# Patient Record
Sex: FEMALE | Race: BLACK OR AFRICAN AMERICAN | Employment: UNEMPLOYED | URBAN - METROPOLITAN AREA
[De-identification: names, ages, dates, MRNs, and addresses within clinical notes are randomized per-mention and may not be internally consistent; named-entity substitution may affect disease eponyms.]

---

## 2017-01-13 DIAGNOSIS — Z76.0 MEDICATION REFILL: ICD-10-CM

## 2017-01-13 DIAGNOSIS — M62.838 MUSCLE SPASM: ICD-10-CM

## 2017-01-13 RX ORDER — GUAIFENESIN 100 MG/5ML
81 LIQUID (ML) ORAL DAILY
Qty: 90 TAB | Refills: 3 | Status: SHIPPED | OUTPATIENT
Start: 2017-01-13 | End: 2017-05-11 | Stop reason: SDUPTHER

## 2017-01-13 RX ORDER — BACLOFEN 10 MG/1
TABLET ORAL
Qty: 90 TAB | Refills: 3 | Status: SHIPPED | OUTPATIENT
Start: 2017-01-13 | End: 2017-02-17 | Stop reason: SDUPTHER

## 2017-04-15 DIAGNOSIS — R10.10 UPPER ABDOMINAL PAIN: ICD-10-CM

## 2017-04-17 RX ORDER — OMEPRAZOLE 20 MG/1
20 CAPSULE, DELAYED RELEASE ORAL DAILY
Qty: 90 CAP | Refills: 3 | Status: SHIPPED | OUTPATIENT
Start: 2017-04-17 | End: 2017-05-11

## 2017-04-24 DIAGNOSIS — Z76.0 MEDICATION REFILL: ICD-10-CM

## 2017-04-24 DIAGNOSIS — M25.552 BILATERAL HIP PAIN: ICD-10-CM

## 2017-04-24 DIAGNOSIS — M62.838 MUSCLE SPASM: ICD-10-CM

## 2017-04-24 DIAGNOSIS — M25.551 BILATERAL HIP PAIN: ICD-10-CM

## 2017-04-24 RX ORDER — TRAMADOL HYDROCHLORIDE 50 MG/1
50 TABLET ORAL
Qty: 120 TAB | Refills: 0 | OUTPATIENT
Start: 2017-04-24

## 2017-04-24 RX ORDER — BACLOFEN 10 MG/1
TABLET ORAL
Qty: 90 TAB | Refills: 6 | OUTPATIENT
Start: 2017-04-24

## 2017-04-24 NOTE — TELEPHONE ENCOUNTER
Denied:    Requested Prescriptions     Refused Prescriptions Disp Refills    baclofen (LIORESAL) 10 mg tablet 90 Tab 6     Sig: take 1 tablet by mouth three times a day if needed for muscle spasm     Refused By: Musa Mckinney     Reason for Refusal: Appt required, please call patient    traMADol (ULTRAM) 50 mg tablet 120 Tab 0     Sig: Take 1 Tab by mouth every six (6) hours as needed for Pain. Refused By: Musa Mckinney     Reason for Refusal: Appt required, please call patient     Pt needs to make an OV with me. Last OV was 11/2016.

## 2017-04-25 NOTE — TELEPHONE ENCOUNTER
Spoke with patient, confirmed name and . Advised patient of need for and appointment, per Dr. Viviane Tony. Patient verbalized understanding, scheduled for [id number] @ 1130.      Be advised

## 2017-05-11 ENCOUNTER — HOSPITAL ENCOUNTER (OUTPATIENT)
Dept: LAB | Age: 62
Discharge: HOME | End: 2017-05-11

## 2017-05-11 ENCOUNTER — OFFICE VISIT (OUTPATIENT)
Dept: INTERNAL MEDICINE CLINIC | Age: 62
End: 2017-05-11

## 2017-05-11 VITALS
SYSTOLIC BLOOD PRESSURE: 119 MMHG | TEMPERATURE: 96.5 F | BODY MASS INDEX: 27.39 KG/M2 | RESPIRATION RATE: 18 BRPM | OXYGEN SATURATION: 96 % | HEIGHT: 63 IN | DIASTOLIC BLOOD PRESSURE: 82 MMHG | HEART RATE: 85 BPM | WEIGHT: 154.6 LBS

## 2017-05-11 DIAGNOSIS — J45.909 UNCOMPLICATED ASTHMA, UNSPECIFIED ASTHMA SEVERITY: ICD-10-CM

## 2017-05-11 DIAGNOSIS — Z76.0 MEDICATION REFILL: ICD-10-CM

## 2017-05-11 DIAGNOSIS — J40 BRONCHITIS: ICD-10-CM

## 2017-05-11 DIAGNOSIS — K59.09 CHRONIC CONSTIPATION: ICD-10-CM

## 2017-05-11 DIAGNOSIS — Z12.11 COLON CANCER SCREENING: ICD-10-CM

## 2017-05-11 DIAGNOSIS — M62.838 MUSCLE SPASM: ICD-10-CM

## 2017-05-11 DIAGNOSIS — J44.9 CHRONIC OBSTRUCTIVE PULMONARY DISEASE, UNSPECIFIED COPD TYPE (HCC): ICD-10-CM

## 2017-05-11 DIAGNOSIS — R05.9 COUGH: ICD-10-CM

## 2017-05-11 DIAGNOSIS — M54.9 UPPER BACK PAIN: ICD-10-CM

## 2017-05-11 DIAGNOSIS — E78.5 DYSLIPIDEMIA: ICD-10-CM

## 2017-05-11 DIAGNOSIS — G89.29 ENCOUNTER FOR CHRONIC PAIN MANAGEMENT: ICD-10-CM

## 2017-05-11 DIAGNOSIS — M25.551 BILATERAL HIP PAIN: ICD-10-CM

## 2017-05-11 DIAGNOSIS — M25.552 BILATERAL HIP PAIN: ICD-10-CM

## 2017-05-11 DIAGNOSIS — E11.9 DIABETES MELLITUS, STABLE (HCC): ICD-10-CM

## 2017-05-11 DIAGNOSIS — Z00.00 ENCOUNTER FOR MEDICARE ANNUAL WELLNESS EXAM: Primary | ICD-10-CM

## 2017-05-11 PROBLEM — Z71.89 ADVANCE DIRECTIVE DISCUSSED WITH PATIENT: Status: ACTIVE | Noted: 2017-05-11

## 2017-05-11 PROCEDURE — 99001 SPECIMEN HANDLING PT-LAB: CPT | Performed by: INTERNAL MEDICINE

## 2017-05-11 RX ORDER — BUDESONIDE AND FORMOTEROL FUMARATE DIHYDRATE 160; 4.5 UG/1; UG/1
2 AEROSOL RESPIRATORY (INHALATION) 2 TIMES DAILY
COMMUNITY
Start: 2017-03-24 | End: 2017-07-07 | Stop reason: SDUPTHER

## 2017-05-11 RX ORDER — AZITHROMYCIN 250 MG/1
TABLET, FILM COATED ORAL
Qty: 6 TAB | Refills: 0 | Status: SHIPPED | OUTPATIENT
Start: 2017-05-11 | End: 2017-05-16

## 2017-05-11 RX ORDER — TRAZODONE HYDROCHLORIDE 50 MG/1
100 TABLET ORAL
Qty: 60 TAB | Refills: 3 | Status: SHIPPED | OUTPATIENT
Start: 2017-05-11 | End: 2018-03-09 | Stop reason: SDUPTHER

## 2017-05-11 RX ORDER — SIMVASTATIN 10 MG/1
10 TABLET, FILM COATED ORAL
Qty: 90 TAB | Refills: 3 | Status: SHIPPED | OUTPATIENT
Start: 2017-05-11 | End: 2018-04-04 | Stop reason: SDUPTHER

## 2017-05-11 RX ORDER — BACLOFEN 10 MG/1
TABLET ORAL
Qty: 90 TAB | Refills: 6 | Status: SHIPPED | OUTPATIENT
Start: 2017-05-11 | End: 2017-06-20 | Stop reason: SDUPTHER

## 2017-05-11 RX ORDER — ALBUTEROL SULFATE 90 UG/1
AEROSOL, METERED RESPIRATORY (INHALATION)
Qty: 1 INHALER | Refills: 3 | Status: SHIPPED | OUTPATIENT
Start: 2017-05-11 | End: 2017-09-05 | Stop reason: SDUPTHER

## 2017-05-11 RX ORDER — BENZONATATE 100 MG/1
100 CAPSULE ORAL
Qty: 15 CAP | Refills: 0 | Status: SHIPPED | OUTPATIENT
Start: 2017-05-11 | End: 2017-08-19 | Stop reason: SDUPTHER

## 2017-05-11 RX ORDER — POLYETHYLENE GLYCOL 3350 17 G/17G
17 POWDER, FOR SOLUTION ORAL
Qty: 90 PACKET | Refills: 3 | Status: SHIPPED | OUTPATIENT
Start: 2017-05-11 | End: 2018-07-24

## 2017-05-11 RX ORDER — MONTELUKAST SODIUM 10 MG/1
10 TABLET ORAL EVERY EVENING
Qty: 90 TAB | Refills: 3 | Status: SHIPPED | OUTPATIENT
Start: 2017-05-11 | End: 2017-10-03 | Stop reason: SDUPTHER

## 2017-05-11 RX ORDER — OMEPRAZOLE 20 MG/1
20 CAPSULE, DELAYED RELEASE ORAL DAILY
Qty: 90 CAP | Refills: 3 | Status: SHIPPED | OUTPATIENT
Start: 2017-05-11 | End: 2017-09-05

## 2017-05-11 RX ORDER — CHOLECALCIFEROL (VITAMIN D3) 125 MCG
2000 CAPSULE ORAL DAILY
Qty: 90 TAB | Refills: 3 | Status: SHIPPED | OUTPATIENT
Start: 2017-05-11

## 2017-05-11 RX ORDER — ALBUTEROL SULFATE 0.83 MG/ML
SOLUTION RESPIRATORY (INHALATION)
Qty: 3 PACKAGE | Refills: 3 | Status: SHIPPED | OUTPATIENT
Start: 2017-05-11 | End: 2018-01-30 | Stop reason: SDUPTHER

## 2017-05-11 RX ORDER — TRAMADOL HYDROCHLORIDE 50 MG/1
50 TABLET ORAL
Qty: 120 TAB | Refills: 0 | Status: SHIPPED | OUTPATIENT
Start: 2017-05-11 | End: 2017-06-20 | Stop reason: SDUPTHER

## 2017-05-11 RX ORDER — GUAIFENESIN 100 MG/5ML
81 LIQUID (ML) ORAL DAILY
Qty: 90 TAB | Refills: 3 | Status: SHIPPED | OUTPATIENT
Start: 2017-05-11 | End: 2018-07-05 | Stop reason: SDUPTHER

## 2017-05-11 NOTE — MR AVS SNAPSHOT
Visit Information Date & Time Provider Department Dept. Phone Encounter #  
 5/11/2017 11:30 AM Yolette Hubbard MD [facility] [phone] [id number] Upcoming Health Maintenance Date Due  
 FOOT EXAM Q1 8/19/1967 MICROALBUMIN Q1 8/19/1967 EYE EXAM RETINAL OR DILATED Q1 8/19/1967 DTaP/Tdap/Td series (1 - Tdap) 8/19/1978 FOBT Q 1 YEAR AGE 50-75 9/23/2015 PAP AKA CERVICAL CYTOLOGY 6/21/2016 HEMOGLOBIN A1C Q6M 5/8/2017 INFLUENZA AGE 9 TO ADULT 8/1/2017 LIPID PANEL Q1 11/8/2017 BREAST CANCER SCRN MAMMOGRAM 7/15/2018 Allergies as of 5/11/2017  Review Complete On: 5/11/2017 By: Yolette Hubbard MD  
  
 Severity Noted Reaction Type Reactions Ibuprofen Medium 05/01/2013   Side Effect Itching, Nausea Only, Swelling  
 swelling Neurontin [Gabapentin] Medium 08/18/2014   Side Effect Anxiety Current Immunizations  Reviewed on 6/29/2016 Name Date Influenza Vaccine 11/6/2014  1:50 PM, 10/17/2013, 10/13/2011, 1/20/2011 Influenza Vaccine (Quad) PF 11/4/2015 Pneumococcal Polysaccharide (PPSV-23) 10/17/2013, 10/13/2011 Not reviewed this visit You Were Diagnosed With   
  
 Codes Comments Encounter for Medicare annual wellness exam    -  Primary ICD-10-CM: Z00.00 ICD-9-CM: V70.0 Diabetes mellitus, stable ([facility])     ICD-10-CM: E11.9 ICD-9-CM: 250.00 Uncomplicated asthma, unspecified asthma severity     ICD-10-CM: J45.909 ICD-9-CM: 493.90 Chronic obstructive pulmonary disease, unspecified COPD type ([facility])     ICD-10-CM: J44.9 ICD-9-CM: 798 Bilateral hip pain     ICD-10-CM: M25.551, M25.552 ICD-9-CM: 719.45 Upper abdominal pain     ICD-10-CM: R10.10 ICD-9-CM: 789.09 Medication refill     ICD-10-CM: Z76.0 ICD-9-CM: V68.1 Muscle spasm     ICD-10-CM: O08.276 ICD-9-CM: 728.85 Chronic constipation     ICD-10-CM: K59.09 
ICD-9-CM: 564.00 Dyslipidemia     ICD-10-CM: E78.5 ICD-9-CM: 272.4 Colon cancer screening     ICD-10-CM: Z12.11 ICD-9-CM: V76.51 Cough     ICD-10-CM: R05 ICD-9-CM: 786.2 Encounter for chronic pain management     ICD-10-CM: G89.29 ICD-9-CM: V65.49 Bronchitis     ICD-10-CM: J40 ICD-9-CM: 360 Vitals BP Pulse Temp Resp Height(growth percentile) Weight(growth percentile) 119/82 (BP 1 Location: Right arm, BP Patient Position: Sitting) 85 96.5 °F (35.8 °C) (Oral) 18 5' 3\" (1.6 m) 154 lb 9.6 oz (70.1 kg) SpO2 BMI OB Status Smoking Status 96% 27.39 kg/m2 Postmenopausal Current Every Day Smoker Vitals History BMI and BSA Data Body Mass Index Body Surface Area  
 27.39 kg/m 2 1.77 m 2 Preferred Pharmacy [address] [place] [address] [id number] [phone] Your Updated Medication List  
  
   
This list is accurate as of: 5/11/17 11:37 AM.  Always use your most recent med list.  
  
  
  
  
 * albuterol 2.5 mg /3 mL (0.083 %) nebulizer solution Commonly known as:  PROVENTIL VENTOLIN  
USe 1 vial every 4-6 hours prn shortness of breath * albuterol 90 mcg/actuation inhaler Commonly known as:  PROVENTIL HFA, VENTOLIN HFA, PROAIR HFA Take 1-2 puffs every 4-6 hrs prn shortness of breath  
  
 aspirin 81 mg chewable tablet Take 1 Tab by mouth daily. azithromycin 250 mg tablet Commonly known as:  Orelia Carolyn Take 2 tablets today, then take 1 tablet daily  
  
 baclofen 10 mg tablet Commonly known as:  LIORESAL  
take 1 tablet by mouth three times a day if needed for muscle spasm  
  
 benzonatate 100 mg capsule Commonly known as:  TESSALON Take 1 Cap by mouth three (3) times daily as needed for Cough. Blood-Glucose Meter monitoring kit Commonly known as:  FREESTYLE LITE METER Check fasting sugars before breakfast. Dx Code 790.21  
  
 cholecalciferol (vitamin D3) 2,000 unit Tab Commonly known as:  VITAMIN D3  
 Take 1 Tab by mouth daily. docusate sodium 100 mg capsule Commonly known as:  Monica Gaw Take 2 po qpm prn constipation  
  
 fluticasone-salmeterol 500-50 mcg/dose diskus inhaler Commonly known as:  ADVAIR Take 1 Puff by inhalation two (2) times a day. glucose blood VI test strips strip Commonly known as:  FREESTYLE LITE STRIPS Check fasting sugars before breakfast. Dx Code 790.21 Lancets Misc Check fasting sugars before breakfast. Dx Code 790.21. For freestyle lite meter. montelukast 10 mg tablet Commonly known as:  SINGULAIR Take 1 Tab by mouth every evening. Nebulizer Accessories Kit Use as directed. Pt needs refill of supplies for nebulizer machine. omeprazole 20 mg capsule Commonly known as:  PRILOSEC Take 1 Cap by mouth daily. polyethylene glycol 17 gram packet Commonly known as:  Treva Moynahan Take 1 Packet by mouth daily as needed (constipation). simvastatin 10 mg tablet Commonly known as:  ZOCOR Take 1 Tab by mouth nightly. SPIRIVA WITH HANDIHALER 18 mcg inhalation capsule Generic drug:  tiotropium  
inhale the contents of one capsule in the handihaler once daily SYMBICORT 160-4.5 mcg/actuation HFA inhaler Generic drug:  budesonide-formoterol Take 2 Puffs by inhalation two (2) times a day. traMADol 50 mg tablet Commonly known as:  ULTRAM  
Take 1 Tab by mouth every six (6) hours as needed for Pain. traZODone 50 mg tablet Commonly known as:  Shelva Austin Take 2 Tabs by mouth nightly. * Notice: This list has 2 medication(s) that are the same as other medications prescribed for you. Read the directions carefully, and ask your doctor or other care provider to review them with you. Prescriptions Printed Refills  
 traMADol (ULTRAM) 50 mg tablet 0 Sig: Take 1 Tab by mouth every six (6) hours as needed for Pain. Class: Print Route: Oral  
  
Prescriptions Sent to Pharmacy Refills  
 simvastatin (ZOCOR) 10 mg tablet 3 Sig: Take 1 Tab by mouth nightly. Class: Normal  
 Pharmacy: [place] [address] Ph #: [phone] Route: Oral  
 traZODone (DESYREL) 50 mg tablet 3 Sig: Take 2 Tabs by mouth nightly. Class: Normal  
 Pharmacy: [place] [address] Ph #: [phone] Route: Oral  
 baclofen (LIORESAL) 10 mg tablet 6 Sig: take 1 tablet by mouth three times a day if needed for muscle spasm Class: Normal  
 Pharmacy: [place] [address] Ph #: [phone]  
 omeprazole (PRILOSEC) 20 mg capsule 3 Sig: Take 1 Cap by mouth daily. Class: Normal  
 Pharmacy: [place] [address] Ph #: [phone] Route: Oral  
 aspirin 81 mg chewable tablet 3 Sig: Take 1 Tab by mouth daily. Class: Normal  
 Pharmacy: [place] [address] Ph #: [phone] Route: Oral  
 cholecalciferol, vitamin D3, (VITAMIN D3) 2,000 unit tab 3 Sig: Take 1 Tab by mouth daily. Class: Normal  
 Pharmacy: [place] [address] Ph #: [phone] Route: Oral  
 montelukast (SINGULAIR) 10 mg tablet 3 Sig: Take 1 Tab by mouth every evening. Class: Normal  
 Pharmacy: [place] [address] Ph #: [phone] Route: Oral  
 polyethylene glycol (MIRALAX) 17 gram packet 3 Sig: Take 1 Packet by mouth daily as needed (constipation). Class: Normal  
 Pharmacy: [place] [address] Ph #: [phone] Route: Oral  
 albuterol (PROVENTIL VENTOLIN) 2.5 mg /3 mL (0.083 %) nebulizer solution 3 Sig: USe 1 vial every 4-6 hours prn shortness of breath  Class: Normal  
 Pharmacy: [id number] [place] [address] Ph #: [phone]  
 albuterol (PROVENTIL HFA, VENTOLIN HFA, PROAIR HFA) 90 mcg/actuation inhaler 3 Sig: Take 1-2 puffs every 4-6 hrs prn shortness of breath Class: Normal  
 Pharmacy: [place] [address] Ph #: [phone]  
 azithromycin (ZITHROMAX) 250 mg tablet 0 Sig: Take 2 tablets today, then take 1 tablet daily Class: Normal  
 Pharmacy: [place] [address] Ph #: [phone]  
 benzonatate (TESSALON) 100 mg capsule 0 Sig: Take 1 Cap by mouth three (3) times daily as needed for Cough. Class: Normal  
 Pharmacy: [place] [address] Ph #: [phone] Route: Oral  
  
We Performed the Following  DIABETES FOOT EXAM [[facility]] To-Do List   
 05/11/2017 Lab:  13-DRUG SCREEN W/CONFIRM, WHOLE BLOOD   
  
 05/11/2017 Lab:  HEMOGLOBIN A1C W/O EAG   
  
 05/11/2017 Lab:  LIPID PANEL   
  
 05/11/2017 Lab:  METABOLIC PANEL, COMPREHENSIVE   
  
 05/11/2017 Lab:  MICROALBUMIN, UR, RAND W/ MICROALBUMIN/CREA RATIO   
  
 05/11/2017 Lab:  OCCULT BLOOD, IMMUNOASSAY (FIT)   
  
 05/11/2017 Imaging:  XR CHEST PA LAT   
  
 05/11/2017 Imaging:  XR SPINE THORAC 2 V   
  
 05/12/2017 Imaging:  MRI HIP BI WO CONT Patient Instructions Advance Care Planning: Care Instructions Your Care Instructions It can be hard to live with an illness that cannot be cured. But if your health is getting worse, you may want to make decisions about end-of-life care. Planning for the end of your life does not mean that you are giving up. It is a way to make sure that your wishes are met. Clearly stating your wishes can make it easier for your loved ones. Making plans while you are still able may also ease your mind and make your final days less stressful and more meaningful. Follow-up care is a key part of your treatment and safety. Be sure to make and go to all appointments, and call your doctor if you are having problems. It's also a good idea to know your test results and keep a list of the medicines you take. What can you do to plan for the end of life? · You can bring these issues up with your doctor. You do not need to wait until your doctor starts the conversation. You might start with \"I would not be willing to live with . Andreina Cashing Andreina Cashing Andreina Cashing \" When you complete this sentence it helps your doctor understand your wishes. · Talk openly and honestly with your doctor. This is the best way to understand the decisions you will need to make as your health changes. Know that you can always change your mind. · Ask your doctor about commonly used life-support measures. These include tube feedings, breathing machines, and fluids given through a vein (IV). Understanding these treatments will help you decide whether you want them. · You may choose to have these life-supporting treatments for a limited time. This allows a trial period to see whether they will help you. You may also decide that you want your doctor to take only certain measures to keep you alive. It is important to spell out these conditions so that your doctor and family understand them. · Talk to your doctor about how long you are likely to live. He or she may be able to give you an idea of what usually happens with your specific illness. · Think about preparing papers that state your wishes. This way there will not be any confusion about what you want. You can change your instructions at any time. Which papers should you prepare? Advance directives are legal papers that tell doctors how you want to be cared for at the end of your life. You do not need a  to write these papers. Ask your doctor or your state health department for information on how to write your advance directives.  They may have the forms for each of these types of papers. Make sure your doctor has a copy of these on file, and give a copy to a family member or close friend. · Consider a do-not-resuscitate order (DNR). This order asks that no extra treatments be done if your heart stops or you stop breathing. Extra treatments may include cardiopulmonary resuscitation (CPR), electrical shock to restart your heart, or a machine to breathe for you. If you decide to have a DNR order, ask your doctor to explain and write it. Place the order in your home where everyone can easily see it. · Consider a living will. A living will explains your wishes about life support and other treatments at the end of your life if you become unable to speak for yourself. Living toussaint tell doctors to use or not use treatments that would keep you alive. You must have one or two witnesses or a notary present when you sign this form. · Consider a durable power of  for health care. This allows you to name a person to make decisions about your care if you are not able to. Most people ask a close friend or family member. Talk to this person about the kinds of treatments you want and those that you do not want. Make sure this person understands your wishes. These legal papers are simple to change. Tell your doctor what you want to change, and ask him or her to make a note in your medical file. Give your family updated copies of the papers. Where can you learn more? Go to http://stevo-tram.info/. Enter P184 in the search box to learn more about \"Advance Care Planning: Care Instructions. \" Current as of: November 17, 2016 Content Version: 11.2 © 1005-1354 [employer]. Care instructions adapted under license by [employer] (which disclaims liability or warranty for this information).  If you have questions about a medical condition or this instruction, always ask your healthcare professional. [employer] disclaims any warranty or liability for your use of this information. Learning About Durable Power of  for Health Care What is a durable power of  for health care? A durable power of  for health care is one type of the legal forms called advance directives. It lets you decide who you want to make treatment decisions for you if you cannot speak or decide for yourself. The person you choose is called your health care agent. Another type of advance directive is a living will. It lets you write down what kinds of treatment or life support you want or do not want. What should you think about when choosing a health care agent? Choose your health care agent carefully. This person may or may not be a family member. Talk to the person before you make your final decision. Make sure he or she is comfortable with this responsibility. It's a good idea to choose someone who: · Is at least 25years old. · Knows you well and understands what makes life meaningful for you. · Understands your Gnosticist and moral values. · Will do what you want, not what he or she wants. · Will be able to make difficult choices at a stressful time. · Will be able to refuse or stop treatment, if that is what you would want, even if you could die. · Will be firm and confident with health professionals if needed. · Will ask questions to get necessary information. · Lives near you or agrees to travel to you if needed. Your family may help you make medical decisions while you can still be part of that process. But it is important to choose one person to be your health care agent in case you are not able to make decisions for yourself. If you don't fill out the legal form and name a health care agent, the decisions your family can make may be limited. Who will make decisions for you if you do not have a health care agent?  
If you don't have a health care agent or a living will, your family members may disagree about your medical care. And then some medical professionals who may not know you as well might have to make decisions for you. In some cases, a  makes the decisions. When you name a health care agent, it is very clear who has the power to make health decisions for you. How do you name a health care agent? You name your health care agent on a legal form. It is usually called a durable power of  for health care. Ask your hospital, state bar association, or office on aging where to find these forms. You must sign the form to make it legal. Some states require you to get the form notarized. This means that a person called a  watches you sign the form and then he or she signs the form. Some states also require that two or more witnesses sign the form. Be sure to tell your family members and doctors who your health care agent is. Keep your forms in a safe place. But make sure that your loved ones know where the forms are. This could be in your desk where you keep other important papers. Make sure your doctor has a copy of your forms. Where can you learn more? Go to http://stevoExtenda-Denttram.info/. Enter [id number] in the search box to learn more about \"Learning About Durable Power of  for [facility]. \" Current as of: November 17, 2016 Content Version: 11.2 © 8730-4369 [employer]. Care instructions adapted under license by [employer] (which disclaims liability or warranty for this information). If you have questions about a medical condition or this instruction, always ask your healthcare professional. [place] [address] any warranty or liability for your use of this information. Deciding About Life-Prolonging Treatment What is life-prolonging treatment? There are many kinds of treatment that can help you live longer. These may be needed for only a short time until your illness improves.  Or you may use them over the long term to help keep you alive. Some treatments include the use of: · Medicines to slow the progress of certain diseases, such as heart disease, diabetes, cancer, AIDS, or Alzheimer's disease. · Antibiotics to treat serious infections, such as pneumonia. · Dialysis to clean your blood if your kidneys stop working. · A breathing machine to help you breathe if you can't breathe on your own. This machine pumps air into your lungs through a tube put into your throat. · A feeding tube or an intravenous (IV) line to give you food and fluids if you can't eat or drink. · Cardiopulmonary resuscitation (CPR) to try to restart your heart. The decision to receive treatments that may help you live longer is a personal one. You may want your doctor to do everything possible to keep you alive, even when your chance for recovery is small. Or you may choose to only have care to manage your pain and other symptoms. What are key points about this decision? · If there is a good chance that your illness can be cured or managed, your doctor may advise you to first try available treatments. If these don't work, then you might think about stopping treatment. · If you stop treatment, you will still receive care that focuses on pain relief and comfort. · A decision to stop treatment that keeps you alive does not have to be permanent. You can always change your mind if your health starts to improve. · Even though treatment focuses on helping you live longer, it may cause side effects that can greatly affect your quality of life. And it could affect how you spend time with your family and friends. · If you still have personal goals that you want to pursue, you may want treatment that keeps you alive long enough to reach them. Why might you choose life-prolonging treatment? · There is a good chance that your illness can be cured or managed. · You think you can manage the possible side effects of treatment. · You don't think treatment will get in the way of your quality of life. · You have personal goals that you still want to pursue and achieve. Why might you choose to stop life-prolonging treatment? · Your chance of surviving your illness is very low. · You have tried all possible treatments for your illness, but they have not helped. · You can no longer deal with the side effects of treatment. · You have already met the goals you set out to achieve in your life. Your decision Thinking about the facts and your feelings can help you make a decision that is right for you. Be sure you understand the benefits and risks of your options, and think about what else you need to do before you make the decision. Where can you learn more? Go to http://stevo-tram.info/. Enter W048 in the search box to learn more about \"Deciding About Life-Prolonging Treatment. \" Current as of: February 24, 2016 Content Version: 11.2 © 3941-3047 [employer]. Care instructions adapted under license by [employer] (which disclaims liability or warranty for this information). If you have questions about a medical condition or this instruction, always ask your healthcare professional. [address] any warranty or liability for your use of this information. Michelle Noriega [address] What is a living will? A living will is a legal form you use to write down the kind of care you want at the end of your life. It is used by the health professionals who will treat you if you aren't able to decide for yourself. If you put your wishes in writing, your loved ones and others will know what kind of care you want. They won't need to guess. This can ease your mind and be helpful to others. A living will is not the same as an estate or property will. An estate will explains what you want to happen with your money and property after you die. Is a living will a legal document? A living will is a legal document. Each state has its own laws about living toussaint. If you move to another state, make sure that your living will is legal in the state where you now live. Or you might use a universal form that has been approved by many states. This kind of form can sometimes be completed and stored online. Your electronic copy will then be available wherever you have a connection to the Internet. In most cases, doctors will respect your wishes even if you have a form from a different state. · You don't need an  to complete a living will. But legal advice can be helpful if your state's laws are unclear, your health history is complicated, or your family can't agree on what should be in your living will. · You can change your living will at any time. Some people find that their wishes about end-of-life care change as their health changes. · In addition to making a living will, think about completing a medical power of  form. This form lets you name the person you want to make end-of-life treatment decisions for you (your \"health care agent\") if you're not able to. Many hospitals and nursing homes will give you the forms you need to complete a living will and a medical power of . · Your living will is used only if you can't make or communicate decisions for yourself anymore. If you become able to make decisions again, you can accept or refuse any treatment, no matter what you wrote in your living will. · Your state may offer an online registry. This is a place where you can store your living will online so the doctors and nurses who need to treat you can find it right away. What should you think about when creating a living will? Talk about your end-of-life wishes with your family members and your doctor. Let them know what you want. That way the people making decisions for you won't be surprised by your choices. Think about these questions as you make your living will: · Do you know enough about life support methods that might be used? If not, talk to your doctor so you know what might be done if you can't breathe on your own, your heart stops, or you're unable to swallow. · What things would you still want to be able to do after you receive life-support methods? Would you want to be able to walk? To speak? To eat on your own? To live without the help of machines? · If you have a choice, where do you want to be cared for? In your home? At a hospital or nursing home? · Do you want certain Hoahaoism practices performed if you become very ill? · If you have a choice at the end of your life, where would you prefer to die? At home? In a hospital or nursing home? Somewhere else? · Would you prefer to be buried or cremated? · Do you want your organs to be donated after you die? What should you do with your living will? · Make sure that your family members and your health care agent have copies of your living will. · Give your doctor a copy of your living will to keep in your medical record. If you have more than one doctor, make sure that each one has a copy. · You may want to put a copy of your living will where it can be easily found. Where can you learn more? Go to http://stevo-tram.info/. Enter N185 in the search box to learn more about \"Learning About Living [place]. \" Current as of: February 24, 2016 Content Version: 11.2 © 1413-2441 [employer]. Care instructions adapted under license by [employer] (which disclaims liability or warranty for this information). If you have questions about a medical condition or this instruction, always ask your healthcare professional. [address] any warranty or liability for your use of this information. Advance Directives: Care Instructions Your Care Instructions An advance directive is a legal way to state your wishes at the end of your life. It tells your family and your doctor what to do if you can no longer say what you want. There are two main types of advance directives. You can change them any time that your wishes change. · A living will tells your family and your doctor your wishes about life support and other treatment. · A durable power of  for health care lets you name a person to make treatment decisions for you when you can't speak for yourself. This person is called a health care agent. If you do not have an advance directive, decisions about your medical care may be made by a doctor or a  who doesn't know you. It may help to think of an advance directive as a gift to the people who care for you. If you have one, they won't have to make tough decisions by themselves. Follow-up care is a key part of your treatment and safety. Be sure to make and go to all appointments, and call your doctor if you are having problems. It's also a good idea to know your test results and keep a list of the medicines you take. How can you care for yourself at home? · Discuss your wishes with your loved ones and your doctor. This way, there are no surprises. · Many states have a unique form. Or you might use a universal form that has been approved by many states. This kind of form can sometimes be completed and stored online. Your electronic copy will then be available wherever you have a connection to the Internet. In most cases, doctors will respect your wishes even if you have a form from a different state. · You don't need a  to do an advance directive. But you may want to get legal advice. · Think about these questions when you prepare an advance directive: ¨ Who do you want to make decisions about your medical care if you are not able to? Many people choose a family member or close friend. ¨ Do you know enough about life support methods that might be used? If not, talk to your doctor so you understand. ¨ What are you most afraid of that might happen? You might be afraid of having pain, losing your independence, or being kept alive by machines. ¨ Where would you prefer to die? Choices include your home, a hospital, or a nursing home. ¨ Would you like to have information about hospice care to support you and your family? ¨ Do you want to donate organs when you die? ¨ Do you want certain Spiritism practices performed before you die? If so, put your wishes in the advance directive. · Read your advance directive every year, and make changes as needed. When should you call for help? Be sure to contact your doctor if you have any questions. Where can you learn more? Go to http://stevo-tram.info/. Enter R264 in the search box to learn more about \"Advance Directives: Care Instructions. \" Current as of: November 17, 2016 Content Version: 11.2 © 7844-2801 [employer]. Care instructions adapted under license by [employer] (which disclaims liability or warranty for this information). If you have questions about a medical condition or this instruction, always ask your healthcare professional. [place] [address] any warranty or liability for your use of this information. Deciding About Being on a Ventilator When You Have a Terminal Illness Your Care Instructions A ventilator is a life-support machine that helps you breathe if you can no longer breathe on your own. The machine provides oxygen to your lungs through a tube. The tube enters your mouth and goes down your throat to your lungs. Most people on ventilators have to be fed through another tube that goes into the stomach. You may feel that being on a ventilator would prevent a \"natural\" death or would keep you alive longer than necessary.  Or you may feel that being on a ventilator would extend your life so you can do certain things, such as saying good-bye to loved ones. The decision about whether to be on a ventilator is a personal one. Be sure to talk it over with your doctor and loved ones. Follow-up care is a key part of your treatment and safety. Be sure to make and go to all appointments, and call your doctor if you are having problems. It's also a good idea to know your test results and keep a list of the medicines you take. Why might you want to be on a ventilator? · You think you may be able to return to your normal activities. · You need help breathing because of a short illness or a problem that is not related to your terminal illness. · You would like more time to say good-bye. · You feel that there are more benefits than risks. Why might you not want to be on a ventilator? · You have other long-term health problems. · You may not be able to return to your normal activities. · You want a calm, peaceful death. You do not want to spend the rest of your life on a ventilator. · You feel that there are more risks than benefits. When should you call for help? Be sure to contact your doctor if: 
· You want to learn more about being on a ventilator. · You change your mind about being on a ventilator. Where can you learn more? Go to http://stevo-tram.info/. Enter X521 in the search box to learn more about \"Deciding About Being on a Ventilator When You Have a Terminal Illness. \" Current as of: February 24, 2016 Content Version: 11.2 © 0799-5662 [employer]. Care instructions adapted under license by [employer] (which disclaims liability or warranty for this information). If you have questions about a medical condition or this instruction, always ask your healthcare professional. [address] any warranty or liability for your use of this information. Bronchitis: Care Instructions Your Care Instructions Bronchitis is inflammation of the bronchial tubes, which carry air to the lungs. The tubes swell and produce mucus, or phlegm. The mucus and inflamed bronchial tubes make you cough. You may have trouble breathing. Most cases of bronchitis are caused by viruses like those that cause colds. Antibiotics usually do not help and they may be harmful. Bronchitis usually develops rapidly and lasts about 2 to 3 weeks in otherwise healthy people. Follow-up care is a key part of your treatment and safety. Be sure to make and go to all appointments, and call your doctor if you are having problems. It's also a good idea to know your test results and keep a list of the medicines you take. How can you care for yourself at home? · Take all medicines exactly as prescribed. Call your doctor if you think you are having a problem with your medicine. · Get some extra rest. 
· Take an over-the-counter pain medicine, such as acetaminophen (Tylenol), ibuprofen (Advil, Motrin), or naproxen (Aleve) to reduce fever and relieve body aches. Read and follow all instructions on the label. · Do not take two or more pain medicines at the same time unless the doctor told you to. Many pain medicines have acetaminophen, which is Tylenol. Too much acetaminophen (Tylenol) can be harmful. · Take an over-the-counter cough medicine that contains dextromethorphan to help quiet a dry, hacking cough so that you can sleep. Avoid cough medicines that have more than one active ingredient. Read and follow all instructions on the label. · Breathe moist air from a humidifier, hot shower, or sink filled with hot water. The heat and moisture will thin mucus so you can cough it out. · Do not smoke. Smoking can make bronchitis worse. If you need help quitting, talk to your doctor about stop-smoking programs and medicines. These can increase your chances of quitting for good. When should you call for help? Call 911 anytime you think you may need emergency care. For example, call if: 
· You have severe trouble breathing. Call your doctor now or seek immediate medical care if: 
· You have new or worse trouble breathing. · You cough up dark brown or bloody mucus (sputum). · You have a new or higher fever. · You have a new rash. Watch closely for changes in your health, and be sure to contact your doctor if: 
· You cough more deeply or more often, especially if you notice more mucus or a change in the color of your mucus. · You are not getting better as expected. Where can you learn more? Go to http://stevo-tram.info/. Enter H333 in the search box to learn more about \"Bronchitis: Care Instructions. \" Current as of: May 23, 2016 Content Version: 11.2 © 6369-0766 [employer]. Care instructions adapted under license by [employer] (which disclaims liability or warranty for this information). If you have questions about a medical condition or this instruction, always ask your healthcare professional. [place] [address] any warranty or liability for your use of this information. Introducing [facility]! [address] introduces Inotrem patient portal. Now you can access parts of your medical record, email your doctor's office, and request medication refills online. 1. In your internet browser, go to [email or web address] 2. Click on the First Time User? Click Here link in the Sign In box. You will see the New Member Sign Up page. 3. Enter your Inotrem Access Code exactly as it appears below. You will not need to use this code after youve completed the sign-up process. If you do not sign up before the expiration date, you must request a new code. · Inotrem Access Code: [email or web address] Expires: 8/9/2017 11:37 AM 
 
4.  Enter the last four digits of your Social Security Number (xxxx) and Date of Birth (mm/dd/yyyy) as indicated and click Submit. You will be taken to the next sign-up page. 5. Create a CoreFlow ID. This will be your CoreFlow login ID and cannot be changed, so think of one that is secure and easy to remember. 6. Create a CoreFlow password. You can change your password at any time. 7. Enter your Password Reset Question and Answer. This can be used at a later time if you forget your password. 8. Enter your e-mail address. You will receive e-mail notification when new information is available in [address] 9. Click Sign Up. You can now view and download portions of your medical record. 10. Click the Download Summary menu link to download a portable copy of your medical information. If you have questions, please visit the Frequently Asked Questions section of the CoreFlow website. Remember, CoreFlow is NOT to be used for urgent needs. For medical emergencies, dial 911. Now available from your iPhone and Android! Please provide this summary of care documentation to your next provider. Your primary care clinician is listed as Leonid Lee. If you have any questions after today's visit, please call [phone].

## 2017-05-11 NOTE — PROGRESS NOTES
ROOM # 1    Gilles Landau presents today for   Chief Complaint   Patient presents with    Annual Wellness Visit    Medication Refill     Requested Prescriptions     Pending Prescriptions Disp Refills    simvastatin (ZOCOR) 10 mg tablet 90 Tab 3     Sig: Take 1 Tab by mouth nightly.  traZODone (DESYREL) 50 mg tablet 60 Tab 3     Sig: Take 2 Tabs by mouth nightly.  baclofen (LIORESAL) 10 mg tablet 90 Tab 6     Sig: take 1 tablet by mouth three times a day if needed for muscle spasm    traMADol (ULTRAM) 50 mg tablet 120 Tab 0     Sig: Take 1 Tab by mouth every six (6) hours as needed for Pain.  omeprazole (PRILOSEC) 20 mg capsule 90 Cap 3     Sig: Take 1 Cap by mouth daily.  aspirin 81 mg chewable tablet 90 Tab 3     Sig: Take 1 Tab by mouth daily.  cholecalciferol, vitamin D3, (VITAMIN D3) 2,000 unit tab       Sig: Take 1 Tab by mouth daily.  montelukast (SINGULAIR) 10 mg tablet 90 Tab 3     Sig: Take 1 Tab by mouth every evening.  polyethylene glycol (MIRALAX) 17 gram packet 90 Packet 3     Sig: Take 1 Packet by mouth daily as needed (constipation).  albuterol (PROVENTIL VENTOLIN) 2.5 mg /3 mL (0.083 %) nebulizer solution 3 Package 3     Sig: USe 1 vial every 4-6 hours prn shortness of breath    albuterol (PROVENTIL HFA, VENTOLIN HFA, PROAIR HFA) 90 mcg/actuation inhaler 1 Inhaler 3     Sig: Take 1-2 puffs every 4-6 hrs prn shortness of breath         Torrey Khan [facility] preferred language for health care discussion is english/other.     Is someone accompanying this pt? no    Is the patient using any DME equipment during OV? no    Depression Screening:  PHQ over the last two weeks 5/11/2017 6/29/2016 2/25/2016 4/27/2015 4/28/2014   Little interest or pleasure in doing things Not at all Not at all Not at all Nearly every day Not at all   Feeling down, depressed or hopeless Not at all Not at all Not at all Nearly every day Not at all   Total Score PHQ 2 0 0 0 6 0       Learning Assessment:  Learning Assessment 5/3/2016 6/11/2015 11/26/2014 6/19/2014 5/29/2014 4/28/2014   PRIMARY LEARNER Patient Patient Patient Patient Patient Patient   HIGHEST LEVEL OF EDUCATION - PRIMARY LEARNER  DID NOT GRADUATE HIGH SCHOOL DID NOT GRADUATE HIGH SCHOOL - - DID NOT GRADUATE HIGH SCHOOL DID NOT GRADUATE HIGH SCHOOL   BARRIERS PRIMARY LEARNER NONE NONE - - - NONE   CO-LEARNER CAREGIVER - - - - - No   PRIMARY LANGUAGE ENGLISH ENGLISH ENGLISH ENGLISH ENGLISH ENGLISH   LEARNER PREFERENCE PRIMARY DEMONSTRATION DEMONSTRATION DEMONSTRATION DEMONSTRATION OTHER (COMMENT) READING   ANSWERED BY patient  Patient patient patient patient Patient   RELATIONSHIP SELF SELF SELF SELF SELF SELF       Abuse Screening:  Abuse Screening Questionnaire 6/11/2015   Do you ever feel afraid of your partner? N   Are you in a relationship with someone who physically or mentally threatens you? N   Is it safe for you to go home? Y       Fall Risk  No flowsheet data found. Health Maintenance reviewed and discussed per provider. Yes    Gearline Pedlar is due for foot exam, eye exam, microalbumin, pap smear, FOBT, A1c, DTAP vax . Please order/place referral if appropriate. Advance Directive:  1. Do you have an advance directive in place? Patient Reply: no    2. If not, would you like material regarding how to put one in place? Patient Reply: no    Coordination of Care:  1. Have you been to the ER, urgent care clinic since your last visit? Hospitalized since your last visit? no    2. Have you seen or consulted any other health care providers outside of the [facility] since your last visit? Include any pap smears or colon screening.  no
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
## 2017-05-11 NOTE — PATIENT INSTRUCTIONS
Advance Care Planning: Care Instructions  Your Care Instructions  It can be hard to live with an illness that cannot be cured. But if your health is getting worse, you may want to make decisions about end-of-life care. Planning for the end of your life does not mean that you are giving up. It is a way to make sure that your wishes are met. Clearly stating your wishes can make it easier for your loved ones. Making plans while you are still able may also ease your mind and make your final days less stressful and more meaningful. Follow-up care is a key part of your treatment and safety. Be sure to make and go to all appointments, and call your doctor if you are having problems. It's also a good idea to know your test results and keep a list of the medicines you take. What can you do to plan for the end of life? · You can bring these issues up with your doctor. You do not need to wait until your doctor starts the conversation. You might start with \"I would not be willing to live with . Lorenso Frankel Lorenso Frankel Lorenso Frankel \" When you complete this sentence it helps your doctor understand your wishes. · Talk openly and honestly with your doctor. This is the best way to understand the decisions you will need to make as your health changes. Know that you can always change your mind. · Ask your doctor about commonly used life-support measures. These include tube feedings, breathing machines, and fluids given through a vein (IV). Understanding these treatments will help you decide whether you want them. · You may choose to have these life-supporting treatments for a limited time. This allows a trial period to see whether they will help you. You may also decide that you want your doctor to take only certain measures to keep you alive. It is important to spell out these conditions so that your doctor and family understand them. · Talk to your doctor about how long you are likely to live.  He or she may be able to give you an idea of what usually happens with your specific illness. · Think about preparing papers that state your wishes. This way there will not be any confusion about what you want. You can change your instructions at any time. Which papers should you prepare? Advance directives are legal papers that tell doctors how you want to be cared for at the end of your life. You do not need a  to write these papers. Ask your doctor or your state health department for information on how to write your advance directives. They may have the forms for each of these types of papers. Make sure your doctor has a copy of these on file, and give a copy to a family member or close friend. · Consider a do-not-resuscitate order (DNR). This order asks that no extra treatments be done if your heart stops or you stop breathing. Extra treatments may include cardiopulmonary resuscitation (CPR), electrical shock to restart your heart, or a machine to breathe for you. If you decide to have a DNR order, ask your doctor to explain and write it. Place the order in your home where everyone can easily see it. · Consider a living will. A living will explains your wishes about life support and other treatments at the end of your life if you become unable to speak for yourself. Living toussaint tell doctors to use or not use treatments that would keep you alive. You must have one or two witnesses or a notary present when you sign this form. · Consider a durable power of  for health care. This allows you to name a person to make decisions about your care if you are not able to. Most people ask a close friend or family member. Talk to this person about the kinds of treatments you want and those that you do not want. Make sure this person understands your wishes. These legal papers are simple to change. Tell your doctor what you want to change, and ask him or her to make a note in your medical file. Give your family updated copies of the papers.   Where can you learn more?  Go to http://stevo-tram.info/. Enter P184 in the search box to learn more about \"Advance Care Planning: Care Instructions. \"  Current as of: November 17, 2016  Content Version: 11.2  © 5323-2489 [employer] Get [employer]ved [employer]. Care instructions adapted under license by [employer] (which disclaims liability or warranty for this information). If you have questions about a medical condition or this instruction, always ask your healthcare professional. [address] any warranty or liability for your use of this information. Learning About Durable Power of  for Health Care  What is a durable power of  for health care? A durable power of  for health care is one type of the legal forms called advance directives. It lets you decide who you want to make treatment decisions for you if you cannot speak or decide for yourself. The person you choose is called your health care agent. Another type of advance directive is a living will. It lets you write down what kinds of treatment or life support you want or do not want. What should you think about when choosing a health care agent? Choose your health care agent carefully. This person may or may not be a family member. Talk to the person before you make your final decision. Make sure he or she is comfortable with this responsibility. It's a good idea to choose someone who:  · Is at least 25years old. · Knows you well and understands what makes life meaningful for you. · Understands your Protestant and moral values. · Will do what you want, not what he or she wants. · Will be able to make difficult choices at a stressful time. · Will be able to refuse or stop treatment, if that is what you would want, even if you could die. · Will be firm and confident with health professionals if needed. · Will ask questions to get necessary information.   · Lives near you or agrees to travel to you if needed. Your family may help you make medical decisions while you can still be part of that process. But it is important to choose one person to be your health care agent in case you are not able to make decisions for yourself. If you don't fill out the legal form and name a health care agent, the decisions your family can make may be limited. Who will make decisions for you if you do not have a health care agent? If you don't have a health care agent or a living will, your family members may disagree about your medical care. And then some medical professionals who may not know you as well might have to make decisions for you. In some cases, a  makes the decisions. When you name a health care agent, it is very clear who has the power to make health decisions for you. How do you name a health care agent? You name your health care agent on a legal form. It is usually called a durable power of  for health care. Ask your hospital, state bar association, or office on aging where to find these forms. You must sign the form to make it legal. Some states require you to get the form notarized. This means that a person called a  watches you sign the form and then he or she signs the form. Some states also require that two or more witnesses sign the form. Be sure to tell your family members and doctors who your health care agent is. Keep your forms in a safe place. But make sure that your loved ones know where the forms are. This could be in your desk where you keep other important papers. Make sure your doctor has a copy of your forms. Where can you learn more? Go to http://stevo-tram.info/. Enter [id number] in the search box to learn more about \"Learning About Durable Power of  for [facility]. \"  Current as of: November 17, 2016  Content Version: 11.2  © 7575-6567 [employer].  Care instructions adapted under license by [employer] (which disclaims liability or warranty for this information). If you have questions about a medical condition or this instruction, always ask your healthcare professional. [address] any warranty or liability for your use of this information. Deciding About Life-Prolonging Treatment  What is life-prolonging treatment? There are many kinds of treatment that can help you live longer. These may be needed for only a short time until your illness improves. Or you may use them over the long term to help keep you alive. Some treatments include the use of:  · Medicines to slow the progress of certain diseases, such as heart disease, diabetes, cancer, AIDS, or Alzheimer's disease. · Antibiotics to treat serious infections, such as pneumonia. · Dialysis to clean your blood if your kidneys stop working. · A breathing machine to help you breathe if you can't breathe on your own. This machine pumps air into your lungs through a tube put into your throat. · A feeding tube or an intravenous (IV) line to give you food and fluids if you can't eat or drink. · Cardiopulmonary resuscitation (CPR) to try to restart your heart. The decision to receive treatments that may help you live longer is a personal one. You may want your doctor to do everything possible to keep you alive, even when your chance for recovery is small. Or you may choose to only have care to manage your pain and other symptoms. What are key points about this decision? · If there is a good chance that your illness can be cured or managed, your doctor may advise you to first try available treatments. If these don't work, then you might think about stopping treatment. · If you stop treatment, you will still receive care that focuses on pain relief and comfort. · A decision to stop treatment that keeps you alive does not have to be permanent. You can always change your mind if your health starts to improve.   · Even though treatment focuses on helping you live longer, it may cause side effects that can greatly affect your quality of life. And it could affect how you spend time with your family and friends. · If you still have personal goals that you want to pursue, you may want treatment that keeps you alive long enough to reach them. Why might you choose life-prolonging treatment? · There is a good chance that your illness can be cured or managed. · You think you can manage the possible side effects of treatment. · You don't think treatment will get in the way of your quality of life. · You have personal goals that you still want to pursue and achieve. Why might you choose to stop life-prolonging treatment? · Your chance of surviving your illness is very low. · You have tried all possible treatments for your illness, but they have not helped. · You can no longer deal with the side effects of treatment. · You have already met the goals you set out to achieve in your life. Your decision  Thinking about the facts and your feelings can help you make a decision that is right for you. Be sure you understand the benefits and risks of your options, and think about what else you need to do before you make the decision. Where can you learn more? Go to http://stevo-tram.info/. Enter N675 in the search box to learn more about \"Deciding About Life-Prolonging Treatment. \"  Current as of: February 24, 2016  Content Version: 11.2  © 8011-2353 [employer]. Care instructions adapted under license by [employer] (which disclaims liability or warranty for this information). If you have questions about a medical condition or this instruction, always ask your healthcare professional. [place] [address] any warranty or liability for your use of this information. Learning About Living Dora Alegria  What is a living will?   A living will is a legal form you use to write down the kind of care you want at the end of your life. It is used by the health professionals who will treat you if you aren't able to decide for yourself. If you put your wishes in writing, your loved ones and others will know what kind of care you want. They won't need to guess. This can ease your mind and be helpful to others. A living will is not the same as an estate or property will. An estate will explains what you want to happen with your money and property after you die. Is a living will a legal document? A living will is a legal document. Each state has its own laws about living toussaint. If you move to another state, make sure that your living will is legal in the state where you now live. Or you might use a universal form that has been approved by many states. This kind of form can sometimes be completed and stored online. Your electronic copy will then be available wherever you have a connection to the Internet. In most cases, doctors will respect your wishes even if you have a form from a different state. · You don't need an  to complete a living will. But legal advice can be helpful if your state's laws are unclear, your health history is complicated, or your family can't agree on what should be in your living will. · You can change your living will at any time. Some people find that their wishes about end-of-life care change as their health changes. · In addition to making a living will, think about completing a medical power of  form. This form lets you name the person you want to make end-of-life treatment decisions for you (your \"health care agent\") if you're not able to. Many hospitals and nursing homes will give you the forms you need to complete a living will and a medical power of . · Your living will is used only if you can't make or communicate decisions for yourself anymore.  If you become able to make decisions again, you can accept or refuse any treatment, no matter what you wrote in your living will. · Your state may offer an online registry. This is a place where you can store your living will online so the doctors and nurses who need to treat you can find it right away. What should you think about when creating a living will? Talk about your end-of-life wishes with your family members and your doctor. Let them know what you want. That way the people making decisions for you won't be surprised by your choices. Think about these questions as you make your living will:  · Do you know enough about life support methods that might be used? If not, talk to your doctor so you know what might be done if you can't breathe on your own, your heart stops, or you're unable to swallow. · What things would you still want to be able to do after you receive life-support methods? Would you want to be able to walk? To speak? To eat on your own? To live without the help of machines? · If you have a choice, where do you want to be cared for? In your home? At a hospital or nursing home? · Do you want certain Religion practices performed if you become very ill? · If you have a choice at the end of your life, where would you prefer to die? At home? In a hospital or nursing home? Somewhere else? · Would you prefer to be buried or cremated? · Do you want your organs to be donated after you die? What should you do with your living will? · Make sure that your family members and your health care agent have copies of your living will. · Give your doctor a copy of your living will to keep in your medical record. If you have more than one doctor, make sure that each one has a copy. · You may want to put a copy of your living will where it can be easily found. Where can you learn more? Go to http://stevo-tram.info/. Enter B374 in the search box to learn more about \"Learning About Living Joel. \"  Current as of: February 24, 2016  Content Version: 11.2  © 4983-1644 [employer]. Care instructions adapted under license by [employer] (which disclaims liability or warranty for this information). If you have questions about a medical condition or this instruction, always ask your healthcare professional. [address] any warranty or liability for your use of this information. Advance Directives: Care Instructions  Your Care Instructions  An advance directive is a legal way to state your wishes at the end of your life. It tells your family and your doctor what to do if you can no longer say what you want. There are two main types of advance directives. You can change them any time that your wishes change. · A living will tells your family and your doctor your wishes about life support and other treatment. · A durable power of  for health care lets you name a person to make treatment decisions for you when you can't speak for yourself. This person is called a health care agent. If you do not have an advance directive, decisions about your medical care may be made by a doctor or a  who doesn't know you. It may help to think of an advance directive as a gift to the people who care for you. If you have one, they won't have to make tough decisions by themselves. Follow-up care is a key part of your treatment and safety. Be sure to make and go to all appointments, and call your doctor if you are having problems. It's also a good idea to know your test results and keep a list of the medicines you take. How can you care for yourself at home? · Discuss your wishes with your loved ones and your doctor. This way, there are no surprises. · Many states have a unique form. Or you might use a universal form that has been approved by many states. This kind of form can sometimes be completed and stored online. Your electronic copy will then be available wherever you have a connection to the Internet.  In most cases, doctors will respect your wishes even if you have a form from a different state. · You don't need a  to do an advance directive. But you may want to get legal advice. · Think about these questions when you prepare an advance directive:  ¨ Who do you want to make decisions about your medical care if you are not able to? Many people choose a family member or close friend. ¨ Do you know enough about life support methods that might be used? If not, talk to your doctor so you understand. ¨ What are you most afraid of that might happen? You might be afraid of having pain, losing your independence, or being kept alive by machines. ¨ Where would you prefer to die? Choices include your home, a hospital, or a nursing home. ¨ Would you like to have information about hospice care to support you and your family? ¨ Do you want to donate organs when you die? ¨ Do you want certain Episcopalian practices performed before you die? If so, put your wishes in the advance directive. · Read your advance directive every year, and make changes as needed. When should you call for help? Be sure to contact your doctor if you have any questions. Where can you learn more? Go to http://stevo-tram.info/. Enter R264 in the search box to learn more about \"Advance Directives: Care Instructions. \"  Current as of: November 17, 2016  Content Version: 11.2  © 5607-7978 [employer]orporated. Care instructions adapted under license by [employer] (which disclaims liability or warranty for this information). If you have questions about a medical condition or this instruction, always ask your healthcare professional. [place] [address] any warranty or liability for your use of this information. Deciding About Being on a Ventilator When You Have a Terminal Illness  Your Care Instructions  A ventilator is a life-support machine that helps you breathe if you can no longer breathe on your own.  The machine provides oxygen to your lungs through a tube. The tube enters your mouth and goes down your throat to your lungs. Most people on ventilators have to be fed through another tube that goes into the stomach. You may feel that being on a ventilator would prevent a \"natural\" death or would keep you alive longer than necessary. Or you may feel that being on a ventilator would extend your life so you can do certain things, such as saying good-bye to loved ones. The decision about whether to be on a ventilator is a personal one. Be sure to talk it over with your doctor and loved ones. Follow-up care is a key part of your treatment and safety. Be sure to make and go to all appointments, and call your doctor if you are having problems. It's also a good idea to know your test results and keep a list of the medicines you take. Why might you want to be on a ventilator? · You think you may be able to return to your normal activities. · You need help breathing because of a short illness or a problem that is not related to your terminal illness. · You would like more time to say good-bye. · You feel that there are more benefits than risks. Why might you not want to be on a ventilator? · You have other long-term health problems. · You may not be able to return to your normal activities. · You want a calm, peaceful death. You do not want to spend the rest of your life on a ventilator. · You feel that there are more risks than benefits. When should you call for help? Be sure to contact your doctor if:  · You want to learn more about being on a ventilator. · You change your mind about being on a ventilator. Where can you learn more? Go to http://stevo-tram.info/. Enter M812 in the search box to learn more about \"Deciding About Being on a Ventilator When You Have a Terminal Illness. \"  Current as of: February 24, 2016  Content Version: 11.2  © 5369-7324 [employer].  Care instructions adapted under license by [employer] (which disclaims liability or warranty for this information). If you have questions about a medical condition or this instruction, always ask your healthcare professional. [address] any warranty or liability for your use of this information. Bronchitis: Care Instructions  Your Care Instructions    Bronchitis is inflammation of the bronchial tubes, which carry air to the lungs. The tubes swell and produce mucus, or phlegm. The mucus and inflamed bronchial tubes make you cough. You may have trouble breathing. Most cases of bronchitis are caused by viruses like those that cause colds. Antibiotics usually do not help and they may be harmful. Bronchitis usually develops rapidly and lasts about 2 to 3 weeks in otherwise healthy people. Follow-up care is a key part of your treatment and safety. Be sure to make and go to all appointments, and call your doctor if you are having problems. It's also a good idea to know your test results and keep a list of the medicines you take. How can you care for yourself at home? · Take all medicines exactly as prescribed. Call your doctor if you think you are having a problem with your medicine. · Get some extra rest.  · Take an over-the-counter pain medicine, such as acetaminophen (Tylenol), ibuprofen (Advil, Motrin), or naproxen (Aleve) to reduce fever and relieve body aches. Read and follow all instructions on the label. · Do not take two or more pain medicines at the same time unless the doctor told you to. Many pain medicines have acetaminophen, which is Tylenol. Too much acetaminophen (Tylenol) can be harmful. · Take an over-the-counter cough medicine that contains dextromethorphan to help quiet a dry, hacking cough so that you can sleep. Avoid cough medicines that have more than one active ingredient. Read and follow all instructions on the label.   · Breathe moist air from a humidifier, hot shower, or sink filled with hot water. The heat and moisture will thin mucus so you can cough it out. · Do not smoke. Smoking can make bronchitis worse. If you need help quitting, talk to your doctor about stop-smoking programs and medicines. These can increase your chances of quitting for good. When should you call for help? Call 911 anytime you think you may need emergency care. For example, call if:  · You have severe trouble breathing. Call your doctor now or seek immediate medical care if:  · You have new or worse trouble breathing. · You cough up dark brown or bloody mucus (sputum). · You have a new or higher fever. · You have a new rash. Watch closely for changes in your health, and be sure to contact your doctor if:  · You cough more deeply or more often, especially if you notice more mucus or a change in the color of your mucus. · You are not getting better as expected. Where can you learn more? Go to http://stevo-tram.info/. Enter H333 in the search box to learn more about \"Bronchitis: Care Instructions. \"  Current as of: May 23, 2016  Content Version: 11.2  © 9780-2716 [employer], [employer]. Care instructions adapted under license by [employer] (which disclaims liability or warranty for this information). If you have questions about a medical condition or this instruction, always ask your healthcare professional. [address] any warranty or liability for your use of this information.
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
## 2017-05-18 ENCOUNTER — TELEPHONE (OUTPATIENT)
Dept: INTERNAL MEDICINE CLINIC | Age: 62
End: 2017-05-18

## 2017-05-18 DIAGNOSIS — J44.9 CHRONIC OBSTRUCTIVE PULMONARY DISEASE, UNSPECIFIED COPD TYPE (HCC): Primary | ICD-10-CM

## 2017-05-18 NOTE — TELEPHONE ENCOUNTER
Patient called in stating she needs new tubing and a full mask for her nubulizer machine.   States she does not remember where she got this machine, to send the order to where is will be covered by her Ins.

## 2017-05-19 LAB
ALBUMIN SERPL-MCNC: 4.1 G/DL (ref 3.5–5.5)
ALBUMIN/CREAT UR: <1.6 MG/G CREAT (ref 0–30)
ALBUMIN/GLOB SERPL: 1.8 {RATIO} (ref 1.2–2.2)
ALP SERPL-CCNC: 56 IU/L (ref 39–117)
ALT SERPL-CCNC: 9 IU/L (ref 0–32)
AMPHETAMINES BLD QL SCN: NEGATIVE NG/ML
AST SERPL-CCNC: 18 IU/L (ref 0–40)
BARBITURATES SERPLBLD QL: NEGATIVE UG/ML
BENZODIAZ BLD QL: NEGATIVE NG/ML
BILIRUB SERPL-MCNC: 0.4 MG/DL (ref 0–1.2)
BUN SERPL-MCNC: 13 MG/DL (ref 6–24)
BUN/CREAT SERPL: 16 (ref 9–23)
CALCIUM SERPL-MCNC: 9.4 MG/DL (ref 8.7–10.2)
CANNABINOIDS BLD QL SCN: NEGATIVE NG/ML
CHLORIDE SERPL-SCNC: 101 MMOL/L (ref 96–106)
CHOLEST SERPL-MCNC: 206 MG/DL (ref 100–199)
CO2 SERPL-SCNC: 24 MMOL/L (ref 18–29)
COCAINE+BZE SERPLBLD QL SCN: NEGATIVE NG/ML
CREAT SERPL-MCNC: 0.82 MG/DL (ref 0.57–1)
CREAT UR-MCNC: 191.9 MG/DL
FENTANYL BLD QL SCN: NEGATIVE NG/ML
GLOBULIN SER CALC-MCNC: 2.3 G/DL (ref 1.5–4.5)
GLUCOSE SERPL-MCNC: 81 MG/DL (ref 65–99)
HBA1C MFR BLD: 5.8 % (ref 4.8–5.6)
HDLC SERPL-MCNC: 93 MG/DL
INTERPRETATION, 910389: NORMAL
LDLC SERPL CALC-MCNC: 97 MG/DL (ref 0–99)
Lab: 102 NG/ML
Lab: POSITIVE
MEPERIDINE, DR296L: NEGATIVE NG/ML
METHADONE BLD QL SCN: NEGATIVE NG/ML
MICROALBUMIN UR-MCNC: <3 UG/ML
OPIATES BLD QL SCN: NEGATIVE NG/ML
OXYCODONES, 738315: NEGATIVE NG/ML
PCP BLD QL SCN: NEGATIVE NG/ML
POTASSIUM SERPL-SCNC: 4.2 MMOL/L (ref 3.5–5.2)
PROPOXYPH BLD QL SCN: NEGATIVE NG/ML
PROT SERPL-MCNC: 6.4 G/DL (ref 6–8.5)
SODIUM SERPL-SCNC: 140 MMOL/L (ref 134–144)
TRAMADOL BLD-MCNC: 553 NG/ML
TRAMADOL, DR297L: ABNORMAL NG/ML
TRIGL SERPL-MCNC: 80 MG/DL (ref 0–149)
VLDLC SERPL CALC-MCNC: 16 MG/DL (ref 5–40)

## 2017-05-22 DIAGNOSIS — K59.00 CONSTIPATION, UNSPECIFIED CONSTIPATION TYPE: ICD-10-CM

## 2017-05-22 RX ORDER — DOCUSATE SODIUM 100 MG/1
CAPSULE, LIQUID FILLED ORAL
Qty: 180 CAP | Refills: 3 | Status: SHIPPED | OUTPATIENT
Start: 2017-05-22 | End: 2018-06-02 | Stop reason: SDUPTHER

## 2017-05-22 NOTE — TELEPHONE ENCOUNTER
Printed rx for:    Requested Prescriptions     Signed Prescriptions Disp Refills    Nebulizer Accessories kit 1 Kit 0     Sig: Use as directed. Pt needs refill of supplies for nebulizer machine. She needs new tubing and full face mask for nebulizer machine. Authorizing Provider: Ras Gramajo     [facility] if [facility] will take her insurance.

## 2017-06-20 DIAGNOSIS — M62.838 MUSCLE SPASM: ICD-10-CM

## 2017-06-20 DIAGNOSIS — Z76.0 MEDICATION REFILL: ICD-10-CM

## 2017-06-20 DIAGNOSIS — M25.551 BILATERAL HIP PAIN: ICD-10-CM

## 2017-06-20 DIAGNOSIS — M25.552 BILATERAL HIP PAIN: ICD-10-CM

## 2017-06-20 RX ORDER — TRAMADOL HYDROCHLORIDE 50 MG/1
50 TABLET ORAL
Qty: 120 TAB | Refills: 0 | Status: SHIPPED | OUTPATIENT
Start: 2017-06-20 | End: 2017-07-31 | Stop reason: SDUPTHER

## 2017-06-20 RX ORDER — BACLOFEN 10 MG/1
TABLET ORAL
Qty: 90 TAB | Refills: 6 | Status: SHIPPED | OUTPATIENT
Start: 2017-06-20 | End: 2018-05-11 | Stop reason: SDUPTHER

## 2017-06-20 NOTE — TELEPHONE ENCOUNTER
Patient request, last OV 5/11/17, no future appt scheduled    Requested Prescriptions     Pending Prescriptions Disp Refills    traMADol (ULTRAM) 50 mg tablet 120 Tab 0     Sig: Take 1 Tab by mouth every six (6) hours as needed for Pain.     baclofen (LIORESAL) 10 mg tablet 90 Tab 6     Sig: take 1 tablet by mouth three times a day if needed for muscle spasm

## 2017-06-20 NOTE — TELEPHONE ENCOUNTER
Printed rx for:    Requested Prescriptions     Signed Prescriptions Disp Refills    traMADol (ULTRAM) 50 mg tablet 120 Tab 0     Sig: Take 1 Tab by mouth every six (6) hours as needed for Pain. Authorizing Provider: Yenny Schaefer     Please let pt know that this is ready for . Sent electronically:     baclofen (LIORESAL) 10 mg tablet 90 Tab 6     Sig: take 1 tablet by mouth three times a day if needed for muscle spasm     Authorizing Provider: Nancy Aldridge  and looked okay. She has been going to the same pharmacy since 1/2017. She needs to make f/u appt. With me in 9/2017.

## 2017-07-07 RX ORDER — BUDESONIDE AND FORMOTEROL FUMARATE DIHYDRATE 160; 4.5 UG/1; UG/1
2 AEROSOL RESPIRATORY (INHALATION) 2 TIMES DAILY
Qty: 1 INHALER | Refills: 2 | Status: SHIPPED | OUTPATIENT
Start: 2017-07-07 | End: 2017-09-05 | Stop reason: SDUPTHER

## 2017-07-07 NOTE — TELEPHONE ENCOUNTER
Requested Prescriptions     Pending Prescriptions Disp Refills    SYMBICORT 160-4.5 mcg/actuation HFA inhaler 1 Inhaler      Sig: Take 2 Puffs by inhalation two (2) times a day.

## 2017-07-25 ENCOUNTER — TELEPHONE (OUTPATIENT)
Dept: INTERNAL MEDICINE CLINIC | Age: 62
End: 2017-07-25

## 2017-07-25 DIAGNOSIS — J45.909 UNCOMPLICATED ASTHMA, UNSPECIFIED ASTHMA SEVERITY: ICD-10-CM

## 2017-07-25 DIAGNOSIS — J44.9 CHRONIC OBSTRUCTIVE PULMONARY DISEASE, UNSPECIFIED COPD TYPE (HCC): Primary | ICD-10-CM

## 2017-07-25 RX ORDER — NEBULIZER AND COMPRESSOR
EACH MISCELLANEOUS
Qty: 1 EACH | Refills: 0 | Status: SHIPPED | OUTPATIENT
Start: 2017-07-25

## 2017-07-25 NOTE — TELEPHONE ENCOUNTER
Patient called and wants to know can she be given a breathing machine for her congestion she has spoken with you about this matter.

## 2017-07-25 NOTE — TELEPHONE ENCOUNTER
Printed rx for:    Requested Prescriptions     Signed Prescriptions Disp Refills    Nebulizer & Compressor machine 1 Each 0     Sig: Use as directed for COPD and asthma. COPD: J44.9  Asthma: J45.909     Authorizing Provider: Kajal RAZO    Nebulizer Accessories kit 1 Kit 0     Sig: Use as directed for COPD and asthma. COPD: J44.9  Asthma: J45.909     Authorizing Provider: Marina Ochoa     This is ready for . She can take to a medical supply store. Unless, she wants us to fax to a particular medical supply store.

## 2017-07-25 NOTE — TELEPHONE ENCOUNTER
Called patient, verified with 2 identifiers. Patient advised of prescription and stated that she will make arrangements to come  from our office and take to medical supply store.

## 2017-07-31 DIAGNOSIS — Z76.0 MEDICATION REFILL: ICD-10-CM

## 2017-07-31 DIAGNOSIS — M25.551 BILATERAL HIP PAIN: ICD-10-CM

## 2017-07-31 DIAGNOSIS — M25.552 BILATERAL HIP PAIN: ICD-10-CM

## 2017-07-31 RX ORDER — TRAMADOL HYDROCHLORIDE 50 MG/1
50 TABLET ORAL
Qty: 120 TAB | Refills: 0 | Status: SHIPPED | OUTPATIENT
Start: 2017-07-31 | End: 2017-09-05 | Stop reason: SDUPTHER

## 2017-07-31 RX ORDER — BUDESONIDE AND FORMOTEROL FUMARATE DIHYDRATE 160; 4.5 UG/1; UG/1
2 AEROSOL RESPIRATORY (INHALATION) 2 TIMES DAILY
Qty: 1 INHALER | Refills: 2 | Status: CANCELLED | OUTPATIENT
Start: 2017-07-31

## 2017-07-31 NOTE — TELEPHONE ENCOUNTER
Requested Prescriptions     Pending Prescriptions Disp Refills    traMADol (ULTRAM) 50 mg tablet 120 Tab 0     Sig: Take 1 Tab by mouth every six (6) hours as needed for Pain.  SYMBICORT 160-4.5 mcg/actuation HFA inhaler 1 Inhaler 2     Sig: Take 2 Puffs by inhalation two (2) times a day.

## 2017-07-31 NOTE — TELEPHONE ENCOUNTER
Already refilled symbicort on 7/7/17. Printed rx for:    Requested Prescriptions     Signed Prescriptions Disp Refills    traMADol (ULTRAM) 50 mg tablet 120 Tab 0     Sig: Take 1 Tab by mouth every six (6) hours as needed for Pain. Authorizing Provider: Jacqueline Back     Having problems with access  today. Printed rx for :    Requested Prescriptions     Signed Prescriptions Disp Refills    traMADol (ULTRAM) 50 mg tablet 120 Tab 0     Sig: Take 1 Tab by mouth every six (6) hours as needed for Pain. Authorizing Provider: Jacqueline Back     Please let pt know that this is ready for .

## 2017-08-10 ENCOUNTER — HOSPITAL ENCOUNTER (OUTPATIENT)
Dept: MRI IMAGING | Age: 62
Discharge: HOME | End: 2017-08-10
Attending: INTERNAL MEDICINE
Payer: MEDICARE

## 2017-08-10 ENCOUNTER — HOSPITAL ENCOUNTER (OUTPATIENT)
Dept: MAMMOGRAPHY | Age: 62
Discharge: HOME | End: 2017-08-10
Attending: INTERNAL MEDICINE
Payer: MEDICARE

## 2017-08-10 ENCOUNTER — HOSPITAL ENCOUNTER (OUTPATIENT)
Dept: GENERAL RADIOLOGY | Age: 62
Discharge: HOME | End: 2017-08-10
Attending: INTERNAL MEDICINE
Payer: MEDICARE

## 2017-08-10 DIAGNOSIS — Z12.31 VISIT FOR SCREENING MAMMOGRAM: ICD-10-CM

## 2017-08-10 DIAGNOSIS — R05.9 COUGH: ICD-10-CM

## 2017-08-10 DIAGNOSIS — M25.551 RIGHT HIP PAIN: ICD-10-CM

## 2017-08-10 DIAGNOSIS — M25.552 LEFT HIP PAIN: ICD-10-CM

## 2017-08-10 DIAGNOSIS — M54.9 UPPER BACK PAIN: ICD-10-CM

## 2017-08-10 PROCEDURE — 71020 XR CHEST PA LAT: CPT

## 2017-08-10 PROCEDURE — 73721 MRI JNT OF LWR EXTRE W/O DYE: CPT

## 2017-08-10 PROCEDURE — 72072 X-RAY EXAM THORAC SPINE 3VWS: CPT

## 2017-08-10 PROCEDURE — 77063 BREAST TOMOSYNTHESIS BI: CPT

## 2017-08-14 DIAGNOSIS — Z12.31 ENCOUNTER FOR SCREENING MAMMOGRAM FOR BREAST CANCER: Primary | ICD-10-CM

## 2017-08-14 DIAGNOSIS — M25.552 BILATERAL HIP PAIN: Primary | ICD-10-CM

## 2017-08-14 DIAGNOSIS — M25.551 BILATERAL HIP PAIN: Primary | ICD-10-CM

## 2017-08-14 NOTE — PROGRESS NOTES
Please let pt know that MRI both hips show:    1) arthritis    2) bursitis    3) cartilage formation in both hips    4) We will refer to orthopedics.

## 2017-08-16 NOTE — PROGRESS NOTES
1) Please let pt know that CXR was negative. 2) Is she still having the cough? 3) Also saw some arthritis in the thoracic area.

## 2017-08-17 ENCOUNTER — TELEPHONE (OUTPATIENT)
Dept: FAMILY MEDICINE CLINIC | Age: 62
End: 2017-08-17

## 2017-08-17 DIAGNOSIS — R05.9 COUGH: Primary | ICD-10-CM

## 2017-08-17 DIAGNOSIS — J40 BRONCHITIS: ICD-10-CM

## 2017-08-17 NOTE — TELEPHONE ENCOUNTER
----- Message from Arpit Chirinos MD sent at 8/15/2017  8:50 PM EDT -----  1) Please let pt know that CXR was negative. 2) Is she still having the cough? 3) Also saw some arthritis in the thoracic area.

## 2017-08-19 RX ORDER — BENZONATATE 100 MG/1
100 CAPSULE ORAL
Qty: 15 CAP | Refills: 0 | Status: SHIPPED | OUTPATIENT
Start: 2017-08-19 | End: 2017-09-05

## 2017-08-19 NOTE — TELEPHONE ENCOUNTER
Contacted pt at [facility] number. Two patient Identifiers confirmed. Advised pt per Dr Virgie Mcneil notes. Pt stated she was advised to get something otc for her cough but has financial limitation and could not. Pt stated she has been using her inhaler. Pt stated she does not have any phlegm with cough and does have a f/u appt with Dr Virgie Mcneil on 9/8/2017 and will futher discuss then.

## 2017-08-20 NOTE — TELEPHONE ENCOUNTER
Sent electronically:    Requested Prescriptions     Signed Prescriptions Disp Refills    benzonatate (TESSALON) 100 mg capsule 15 Cap 0     Sig: Take 1 Cap by mouth three (3) times daily as needed for Cough. Authorizing Provider: Orvan Cooler     She should come in sooner than 9/8/17 if cough is worse and not getting any better.

## 2017-09-05 ENCOUNTER — OFFICE VISIT (OUTPATIENT)
Dept: INTERNAL MEDICINE CLINIC | Age: 62
End: 2017-09-05

## 2017-09-05 VITALS
HEIGHT: 63 IN | TEMPERATURE: 98 F | HEART RATE: 79 BPM | SYSTOLIC BLOOD PRESSURE: 130 MMHG | WEIGHT: 157.2 LBS | OXYGEN SATURATION: 96 % | BODY MASS INDEX: 27.85 KG/M2 | DIASTOLIC BLOOD PRESSURE: 83 MMHG | RESPIRATION RATE: 18 BRPM

## 2017-09-05 DIAGNOSIS — Z23 NEED FOR TDAP VACCINATION: ICD-10-CM

## 2017-09-05 DIAGNOSIS — E78.5 DYSLIPIDEMIA: ICD-10-CM

## 2017-09-05 DIAGNOSIS — Z76.0 MEDICATION REFILL: ICD-10-CM

## 2017-09-05 DIAGNOSIS — K21.9 GASTROESOPHAGEAL REFLUX DISEASE WITHOUT ESOPHAGITIS: ICD-10-CM

## 2017-09-05 DIAGNOSIS — M25.551 BILATERAL HIP PAIN: ICD-10-CM

## 2017-09-05 DIAGNOSIS — R63.0 LOSS OF APPETITE: ICD-10-CM

## 2017-09-05 DIAGNOSIS — R73.01 ELEVATED FASTING GLUCOSE: ICD-10-CM

## 2017-09-05 DIAGNOSIS — M25.552 BILATERAL HIP PAIN: ICD-10-CM

## 2017-09-05 DIAGNOSIS — G89.29 ENCOUNTER FOR CHRONIC PAIN MANAGEMENT: ICD-10-CM

## 2017-09-05 DIAGNOSIS — J44.9 CHRONIC OBSTRUCTIVE PULMONARY DISEASE, UNSPECIFIED COPD TYPE (HCC): ICD-10-CM

## 2017-09-05 DIAGNOSIS — R53.83 FATIGUE, UNSPECIFIED TYPE: ICD-10-CM

## 2017-09-05 DIAGNOSIS — R09.89 CHEST CONGESTION: Primary | ICD-10-CM

## 2017-09-05 DIAGNOSIS — Z23 NEED FOR ZOSTAVAX ADMINISTRATION: ICD-10-CM

## 2017-09-05 RX ORDER — ALBUTEROL SULFATE 90 UG/1
AEROSOL, METERED RESPIRATORY (INHALATION)
Qty: 3 INHALER | Refills: 3 | Status: SHIPPED | OUTPATIENT
Start: 2017-09-05 | End: 2017-12-19 | Stop reason: SDUPTHER

## 2017-09-05 RX ORDER — OMEPRAZOLE 20 MG/1
20 CAPSULE, DELAYED RELEASE ORAL DAILY
Qty: 90 CAP | Refills: 3 | Status: SHIPPED | OUTPATIENT
Start: 2017-09-05 | End: 2017-10-23

## 2017-09-05 RX ORDER — TRAMADOL HYDROCHLORIDE 50 MG/1
50 TABLET ORAL
Qty: 120 TAB | Refills: 0 | Status: SHIPPED | OUTPATIENT
Start: 2017-09-05 | End: 2017-10-16 | Stop reason: SDUPTHER

## 2017-09-05 RX ORDER — GUAIFENESIN 600 MG/1
600 TABLET, EXTENDED RELEASE ORAL 2 TIMES DAILY
Qty: 30 TAB | Refills: 1 | Status: SHIPPED | OUTPATIENT
Start: 2017-09-05 | End: 2017-10-23 | Stop reason: SDUPTHER

## 2017-09-05 RX ORDER — BUDESONIDE AND FORMOTEROL FUMARATE DIHYDRATE 160; 4.5 UG/1; UG/1
2 AEROSOL RESPIRATORY (INHALATION) 2 TIMES DAILY
Qty: 3 INHALER | Refills: 3 | Status: SHIPPED | OUTPATIENT
Start: 2017-09-05 | End: 2017-10-23 | Stop reason: CLARIF

## 2017-09-05 NOTE — PATIENT INSTRUCTIONS
Maria G Orlando    Physician           Specialty     Orthopedic Surgery       Primary Contact Information      Phone Fax E-mail Address     [phone] Not available. [address] [facility]           1) try glucerna with each meal    2) follow-up in 3 months or sooner if worsening symptoms.

## 2017-09-05 NOTE — PROGRESS NOTES
ROOM # 2    Paulo Lopez presents today for   Chief Complaint   Patient presents with    Diabetes     3 month f/u       Paulo Lopez preferred language for health care discussion is english/other. Is someone accompanying this pt? no    Is the patient using any DME equipment during OV? no    Depression Screening:  PHQ over the last two weeks 5/11/2017 6/29/2016 2/25/2016 4/27/2015 4/28/2014   Little interest or pleasure in doing things Not at all Not at all Not at all Nearly every day Not at all   Feeling down, depressed or hopeless Not at all Not at all Not at all Nearly every day Not at all   Total Score PHQ 2 0 0 0 6 0       Learning Assessment:  Learning Assessment 5/3/2016 6/11/2015 11/26/2014 6/19/2014 5/29/2014 4/28/2014   PRIMARY LEARNER Patient Patient Patient Patient Patient Patient   HIGHEST LEVEL OF EDUCATION - PRIMARY LEARNER  DID NOT GRADUATE HIGH SCHOOL DID NOT GRADUATE HIGH SCHOOL - - DID NOT GRADUATE HIGH SCHOOL DID NOT GRADUATE [facility] LEARNER NONE NONE - - - [address] CAREGIVER - - - - - No   PRIMARY [facility]   LEARNER PREFERENCE PRIMARY DEMONSTRATION DEMONSTRATION DEMONSTRATION DEMONSTRATION OTHER (COMMENT) READING   ANSWERED BY patient  Patient patient patient patient Patient   RELATIONSHIP SELF SELF SELF SELF SELF SELF       Abuse Screening:  Abuse Screening Questionnaire 6/11/2015   Do you ever feel afraid of your partner? N   Are you in a relationship with someone who physically or mentally threatens you? N   Is it safe for you to go home? Y       Health Maintenance reviewed and discussed per provider. Yes    Paulo Lopez is due for Tdap, pap, zoster, FOBT, flu Provider prefers to order all HM due himself. Please order/place referral if appropriate. Advance Directive:  1. Do you have an advance directive in place? Patient Reply: no    2.  If not, would you like material regarding how to put one in place? Patient Reply: no    Coordination of Care:  1. Have you been to the ER, urgent care clinic since your last visit? Hospitalized since your last visit? no    2. Have you seen or consulted any other health care providers outside of the [address] [place] [address] since your last visit? Include any pap smears or colon screening. no    Please see Red banners under Allergies, Med rec, Immunizations to remove outside inquires. All correct information has been verified with patient and added to chart. Comments: patient stated that cough Rx not helping.
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
## 2017-09-05 NOTE — MR AVS SNAPSHOT
Visit Information Date & Time Provider Department Dept. Phone Encounter #  
 9/5/2017  9:30 AM Lester Aragon MD [address] [phone] [id number] Follow-up Instructions Return in about 3 months (around 12/5/2017) for f/u HTN/lipids. Upcoming Health Maintenance Date Due DTaP/Tdap/Td series (1 - Tdap) 8/19/1978 ZOSTER VACCINE AGE 60> 6/19/2017 INFLUENZA AGE 9 TO ADULT 10/5/2017* FOBT Q 1 YEAR AGE 50-75 11/10/2017* PAP AKA CERVICAL CYTOLOGY 12/5/2017* MEDICARE YEARLY EXAM 5/12/2018 BREAST CANCER SCRN MAMMOGRAM 8/10/2019 *Topic was postponed. The date shown is not the original due date. Allergies as of 9/5/2017  Review Complete On: 9/5/2017 By: Lester Aragon MD  
  
 Severity Noted Reaction Type Reactions Ibuprofen Medium 05/01/2013   Side Effect Itching, Nausea Only, Swelling  
 swelling Neurontin [Gabapentin] Medium 08/18/2014   Side Effect Anxiety Current Immunizations  Reviewed on 8/30/2017 Name Date Influenza Vaccine 11/6/2014  1:50 PM, 10/17/2013, 10/13/2011, 1/20/2011 Influenza Vaccine (Quad) PF 11/4/2015 Pneumococcal Polysaccharide (PPSV-23) 10/17/2013, 10/13/2011 Not reviewed this visit You Were Diagnosed With   
  
 Codes Comments Loss of appetite    -  Primary ICD-10-CM: R63.0 ICD-9-CM: 783.0 Gastroesophageal reflux disease without esophagitis     ICD-10-CM: K21.9 ICD-9-CM: 530.81 Elevated fasting glucose     ICD-10-CM: R73.01 
ICD-9-CM: 790.21 Dyslipidemia     ICD-10-CM: E78.5 ICD-9-CM: 272.4 Medication refill     ICD-10-CM: Z76.0 ICD-9-CM: V68.1 Chest congestion     ICD-10-CM: R09.89 ICD-9-CM: 673. 9 Chronic obstructive pulmonary disease, unspecified COPD type ([facility]     ICD-10-CM: J44.9 ICD-9-CM: 120 Bilateral hip pain     ICD-10-CM: M25.551, M25.552 ICD-9-CM: 719.45  Encounter for chronic pain management     ICD-10-CM: G89.29 
 ICD-9-CM: V65.49 Fatigue, unspecified type     ICD-10-CM: R53.83 ICD-9-CM: 780.79 Need for Zostavax administration     ICD-10-CM: O33 ICD-9-CM: V04.89 Need for Tdap vaccination     ICD-10-CM: H56 ICD-9-CM: V06.1 Vitals BP Pulse Temp Resp Height(growth percentile) Weight(growth percentile) 130/83 (BP 1 Location: Right arm, BP Patient Position: Sitting) 79 98 °F (36.7 °C) (Oral) 18 5' 3\" (1.6 m) 157 lb 3.2 oz (71.3 kg) SpO2 BMI OB Status Smoking Status 96% 27.85 kg/m2 Postmenopausal Current Every Day Smoker BMI and BSA Data Body Mass Index Body Surface Area  
 27.85 kg/m 2 1.78 m 2 Preferred Pharmacy Pharmacy Name Phone [address] [phone] Your Updated Medication List  
  
   
This list is accurate as of: 9/5/17  9:32 AM.  Always use your most recent med list.  
  
  
  
  
 * albuterol 2.5 mg /3 mL (0.083 %) nebulizer solution Commonly known as:  PROVENTIL VENTOLIN  
USe 1 vial every 4-6 hours prn shortness of breath * albuterol 90 mcg/actuation inhaler Commonly known as:  PROVENTIL HFA, VENTOLIN HFA, PROAIR HFA Take 1-2 puffs every 4-6 hrs prn shortness of breath  
  
 aspirin 81 mg chewable tablet Take 1 Tab by mouth daily. baclofen 10 mg tablet Commonly known as:  LIORESAL  
take 1 tablet by mouth three times a day if needed for muscle spasm Blood-Glucose Meter monitoring kit Commonly known as:  FREESTYLE LITE METER Check fasting sugars before breakfast. Dx Code 790.21  
  
 cholecalciferol (vitamin D3) 2,000 unit Tab Commonly known as:  VITAMIN D3 Take 1 Tab by mouth daily. diph,Pertuss(Acell),Tet Vac-PF 2 Lf-(2.5-5-3-5 mcg)-5Lf/0.5 mL susp Commonly known as:  ADACEL  
0.5 mL by IntraMUSCular route once for 1 dose. docusate sodium 100 mg capsule Commonly known as:  Judith Mosley Take 2 po qpm prn constipation glucose blood VI test strips strip Commonly known as:  FREESTYLE LITE STRIPS Check fasting sugars before breakfast. Dx Code 790.21  
  
 guaiFENesin  mg ER tablet Commonly known as:  Vince & Vince Take 1 Tab by mouth two (2) times a day. Indications: for chest congestion Lancets Misc Check fasting sugars before breakfast. Dx Code 790.21. For freestyle lite meter. montelukast 10 mg tablet Commonly known as:  SINGULAIR Take 1 Tab by mouth every evening. Nebulizer & Compressor machine Use as directed for COPD and asthma. COPD: J44.9  Asthma: J45.909 * Nebulizer Accessories Kit Use as directed. Pt needs refill of supplies for nebulizer machine. She needs new tubing and full face mask for nebulizer machine. * Nebulizer Accessories Kit Use as directed for COPD and asthma. COPD: J44.9  Asthma: J45.909  
  
 omeprazole 20 mg capsule Commonly known as:  PRILOSEC Take 1 Cap by mouth daily. polyethylene glycol 17 gram packet Commonly known as:  Emmer Picher Take 1 Packet by mouth daily as needed (constipation). simvastatin 10 mg tablet Commonly known as:  ZOCOR Take 1 Tab by mouth nightly. SYMBICORT 160-4.5 mcg/actuation HFA inhaler Generic drug:  budesonide-formoterol Take 2 Puffs by inhalation two (2) times a day. tiotropium 18 mcg inhalation capsule Commonly known as:  [address]  
inhale the contents of one capsule in the handihaler once daily  
  
 traMADol 50 mg tablet Commonly known as:  ULTRAM  
Take 1 Tab by mouth every six (6) hours as needed for Pain. traZODone 50 mg tablet Commonly known as:  Angelika Khane Take 2 Tabs by mouth nightly. varicella zoster vacine live 19,400 unit/0.65 mL Susr injection Commonly known as:  ZOSTAVAX  
1 Vial by SubCUTAneous route once for 1 dose. * Notice:   This list has 4 medication(s) that are the same as other medications prescribed for you. Read the directions carefully, and ask your doctor or other care provider to review them with you. Prescriptions Printed Refills  
 traMADol (ULTRAM) 50 mg tablet 0 Sig: Take 1 Tab by mouth every six (6) hours as needed for Pain. Class: Print Route: Oral  
 diph,Pertuss,Acell,,Tet Vac-PF (ADACEL) 2 Lf-(2.5-5-3-5 mcg)-5Lf/0.5 mL susp 0 Si.5 mL by IntraMUSCular route once for 1 dose. Class: Print Route: IntraMUSCular  
 varicella zoster vacine live (ZOSTAVAX) 19,400 unit/0.65 mL susr injection 0 Si Vial by SubCUTAneous route once for 1 dose. Class: Print Route: SubCUTAneous Prescriptions Sent to Pharmacy Refills  
 omeprazole (PRILOSEC) 20 mg capsule 3 Sig: Take 1 Cap by mouth daily. Class: Normal  
 Pharmacy: [place] [address] Ph #: [phone] Route: Oral  
 guaiFENesin ER (MUCINEX) 600 mg ER tablet 1 Sig: Take 1 Tab by mouth two (2) times a day. Indications: for chest congestion Class: Normal  
 Pharmacy: [place] [address] Ph #: [phone] Route: Oral  
 SYMBICORT 160-4.5 mcg/actuation HFA inhaler 3 Sig: Take 2 Puffs by inhalation two (2) times a day. Class: Normal  
 Pharmacy: [place] [address] Ph #: [phone] Route: Inhalation  
 tiotropium (SPIRIVA WITH HANDIHALER) 18 mcg inhalation capsule 3 Sig: inhale the contents of one capsule in the handihaler once daily Class: Normal  
 Pharmacy: [place] [address] Ph #: [phone]  
 albuterol (PROVENTIL HFA, VENTOLIN HFA, PROAIR HFA) 90 mcg/actuation inhaler 3 Sig: Take 1-2 puffs every 4-6 hrs prn shortness of breath Class: Normal  
 Pharmacy: [address], [place] [address] Ph #: [phone] Follow-up Instructions Return in about 3 months (around 12/5/2017) for f/u HTN/lipids. To-Do List   
 09/05/2017 Lab:  14-DRUG SCREEN, UR   
  
 09/05/2017 Lab:  CBC W/O DIFF   
  
 09/05/2017 Lab:  HEMOGLOBIN A1C WITH EAG   
  
 09/05/2017 Lab:  LIPID PANEL   
  
 09/05/2017 Lab:  METABOLIC PANEL, COMPREHENSIVE   
  
 09/05/2017 Lab:  TSH 3RD GENERATION Patient Instructions Indio Humphrey Physician  
  
  
  Specialty  
  Orthopedic Surgery  
   
Primary Contact Information   
  Phone Fax E-mail Address  
  [phone] Not available. [address]  
     
 
1) try glucerna with each meal 
 
2) follow-up in 3 months or sooner if worsening symptoms. Introducing [facility]! Emily Perez introduces [employer] patient portal. Now you can access parts of your medical record, email your doctor's office, and request medication refills online. 1. In your internet browser, go to [email or web address] 2. Click on the First Time User? Click Here link in the Sign In box. You will see the New Member Sign Up page. 3. Enter your [employer] Access Code exactly as it appears below. You will not need to use this code after youve completed the sign-up process. If you do not sign up before the expiration date, you must request a new code. · [employer] Access Code: [email or web address] Expires: 11/8/2017  8:41 AM 
 
4. Enter the last four digits of your Social Security Number (xxxx) and Date of Birth (mm/dd/yyyy) as indicated and click Submit. You will be taken to the next sign-up page. 5. Create a Creoptixt ID. This will be your [employer] login ID and cannot be changed, so think of one that is secure and easy to remember. 6. Create a Word[employer] password. You can change your password at any time. 7. Enter your Password Reset Question and Answer.  This can be used at a later time if you forget your password. 8. Enter your e-mail address. You will receive e-mail notification when new information is available in [address] 9. Click Sign Up. You can now view and download portions of your medical record. 10. Click the Download Summary menu link to download a portable copy of your medical information. If you have questions, please visit the Frequently Asked Questions section of the [employer] website. Remember, [employer] is NOT to be used for urgent needs. For medical emergencies, dial 911. Now available from your iPhone and Android! Please provide this summary of care documentation to your next provider. Your primary care clinician is listed as Leonid Lee. If you have any questions after today's visit, please call [phone].

## 2017-09-06 LAB
ALBUMIN SERPL-MCNC: 4.2 G/DL (ref 3.6–4.8)
ALBUMIN/GLOB SERPL: 1.7 {RATIO} (ref 1.2–2.2)
ALP SERPL-CCNC: 61 IU/L (ref 39–117)
ALT SERPL-CCNC: 14 IU/L (ref 0–32)
AST SERPL-CCNC: 22 IU/L (ref 0–40)
BILIRUB SERPL-MCNC: 0.3 MG/DL (ref 0–1.2)
BUN SERPL-MCNC: 9 MG/DL (ref 8–27)
BUN/CREAT SERPL: 10 (ref 12–28)
CALCIUM SERPL-MCNC: 9 MG/DL (ref 8.7–10.3)
CHLORIDE SERPL-SCNC: 102 MMOL/L (ref 96–106)
CHOLEST SERPL-MCNC: 143 MG/DL (ref 100–199)
CO2 SERPL-SCNC: 27 MMOL/L (ref 18–29)
CREAT SERPL-MCNC: 0.89 MG/DL (ref 0.57–1)
ERYTHROCYTE [DISTWIDTH] IN BLOOD BY AUTOMATED COUNT: 14.7 % (ref 12.3–15.4)
EST. AVERAGE GLUCOSE BLD GHB EST-MCNC: 114 MG/DL
GLOBULIN SER CALC-MCNC: 2.5 G/DL (ref 1.5–4.5)
GLUCOSE SERPL-MCNC: 59 MG/DL (ref 65–99)
HBA1C MFR BLD: 5.6 % (ref 4.8–5.6)
HCT VFR BLD AUTO: 42.1 % (ref 34–46.6)
HDLC SERPL-MCNC: 77 MG/DL
HGB BLD-MCNC: 13.7 G/DL (ref 11.1–15.9)
INTERPRETATION, 910389: NORMAL
LDLC SERPL CALC-MCNC: 53 MG/DL (ref 0–99)
MCH RBC QN AUTO: 28.8 PG (ref 26.6–33)
MCHC RBC AUTO-ENTMCNC: 32.5 G/DL (ref 31.5–35.7)
MCV RBC AUTO: 89 FL (ref 79–97)
PLATELET # BLD AUTO: 225 X10E3/UL (ref 150–379)
POTASSIUM SERPL-SCNC: 4 MMOL/L (ref 3.5–5.2)
PROT SERPL-MCNC: 6.7 G/DL (ref 6–8.5)
RBC # BLD AUTO: 4.75 X10E6/UL (ref 3.77–5.28)
SODIUM SERPL-SCNC: 145 MMOL/L (ref 134–144)
TRIGL SERPL-MCNC: 64 MG/DL (ref 0–149)
TSH SERPL DL<=0.005 MIU/L-ACNC: 3.2 UIU/ML (ref 0.45–4.5)
VLDLC SERPL CALC-MCNC: 13 MG/DL (ref 5–40)
WBC # BLD AUTO: 8.4 X10E3/UL (ref 3.4–10.8)

## 2017-09-07 ENCOUNTER — TELEPHONE (OUTPATIENT)
Dept: INTERNAL MEDICINE CLINIC | Age: 62
End: 2017-09-07

## 2017-09-07 NOTE — TELEPHONE ENCOUNTER
----- Message from Lester Aragon MD sent at 9/7/2017  9:34 AM EDT -----  Please let pt know that labs were normal except:    1) glucose low at 59. Is she having any dizziness or headaches? How is her appetite? Is taking the glucerna? 2) sodium little up at 145. We will monitor. Keep hydrated with water. 3) HgA1C is good at 5.6.

## 2017-09-08 ENCOUNTER — TELEPHONE (OUTPATIENT)
Dept: INTERNAL MEDICINE CLINIC | Age: 62
End: 2017-09-08

## 2017-09-08 NOTE — TELEPHONE ENCOUNTER
Contacted pt at [facility] number. Two patient Identifiers confirmed. Advised pt per Dr Nayla Bradley notes. Pt verbalized understanding.

## 2017-09-10 LAB
AMPHETAMINES UR QL SCN: NEGATIVE NG/ML
BARBITURATES UR QL SCN: NEGATIVE NG/ML
BENZODIAZ UR QL SCN: NEGATIVE NG/ML
BUPRENORPHINE UR QL: NEGATIVE NG/ML
BZE UR QL SCN: NEGATIVE NG/ML
CANNABINOIDS UR QL SCN: NEGATIVE NG/ML
CREAT UR-MCNC: 205.5 MG/DL (ref 20–300)
FENTANYL+NORFENTANYL UR QL SCN: NEGATIVE PG/ML
MEPERIDINE UR QL: NEGATIVE NG/ML
METHADONE UR QL SCN: NEGATIVE NG/ML
OPIATES UR QL SCN: NEGATIVE NG/ML
OXYCODONE+OXYMORPHONE UR QL SCN: NEGATIVE NG/ML
PCP UR QL: NEGATIVE NG/ML
PH UR: 5.5 [PH] (ref 4.5–8.9)
PLEASE NOTE:, 733157: NORMAL
PROPOXYPH UR QL SCN: NEGATIVE NG/ML
SP GR UR: 1.02
SPECIMEN STATUS REPORT, ROLRST: NORMAL
TRAMADOL UR CFM-MCNC: >3000 NG/ML
TRAMADOL UR QL SCN: NORMAL NG/ML
TRAMADOL UR-MCNC: POSITIVE UG/ML

## 2017-10-02 DIAGNOSIS — J30.9 ALLERGIC RHINITIS: ICD-10-CM

## 2017-10-03 RX ORDER — MONTELUKAST SODIUM 10 MG/1
TABLET ORAL
Qty: 90 TAB | Refills: 3 | Status: SHIPPED | OUTPATIENT
Start: 2017-10-03 | End: 2017-10-23

## 2017-10-16 ENCOUNTER — TELEPHONE (OUTPATIENT)
Dept: INTERNAL MEDICINE CLINIC | Age: 62
End: 2017-10-16

## 2017-10-16 DIAGNOSIS — M25.552 BILATERAL HIP PAIN: ICD-10-CM

## 2017-10-16 DIAGNOSIS — Z76.0 MEDICATION REFILL: ICD-10-CM

## 2017-10-16 DIAGNOSIS — M25.551 BILATERAL HIP PAIN: ICD-10-CM

## 2017-10-16 NOTE — TELEPHONE ENCOUNTER
Requested Prescriptions     Pending Prescriptions Disp Refills    traMADol (ULTRAM) 50 mg tablet 120 Tab 0     Sig: Take 1 Tab by mouth every six (6) hours as needed for Pain.

## 2017-10-16 NOTE — TELEPHONE ENCOUNTER
Patient states she still has a cold in her chest, that hasn't gotten better with medication prescribed from last visit 9/5/2017.

## 2017-10-17 RX ORDER — TRAMADOL HYDROCHLORIDE 50 MG/1
50 TABLET ORAL
Qty: 120 TAB | Refills: 0 | Status: SHIPPED | OUTPATIENT
Start: 2017-10-17 | End: 2017-11-21 | Stop reason: SDUPTHER

## 2017-10-17 NOTE — TELEPHONE ENCOUNTER
Pt contacted at home number. 2 pt identifiers confirmed. Pt informed of below. Pt verbalized understanding. Pt scheduled for appointment on Monday, October 23, 2017 10:30 AM.  No other questions at this time.

## 2017-10-17 NOTE — TELEPHONE ENCOUNTER
printed and placed in PCP medication refill review folder.      Last UDS date: 9/5/2017  Pain contract signed: 11/4/2015 - unless one was signed recently and not scanned in yet  Last office visit: 09/05/2017  Next Appt: none     sent to printer #[id number]

## 2017-10-17 NOTE — TELEPHONE ENCOUNTER
Checked  and no aberrancies seen. Printed rx for:    Requested Prescriptions     Signed Prescriptions Disp Refills    traMADol (ULTRAM) 50 mg tablet 120 Tab 0     Sig: Take 1 Tab by mouth every six (6) hours as needed for Pain. Authorizing Provider: Fly Persaud     Please let pt know that this is ready for . She will need to sign pain agreement before getting the rx.

## 2017-10-23 ENCOUNTER — OFFICE VISIT (OUTPATIENT)
Dept: INTERNAL MEDICINE CLINIC | Age: 62
End: 2017-10-23

## 2017-10-23 VITALS
WEIGHT: 156 LBS | HEART RATE: 81 BPM | BODY MASS INDEX: 27.64 KG/M2 | HEIGHT: 63 IN | OXYGEN SATURATION: 99 % | RESPIRATION RATE: 18 BRPM | TEMPERATURE: 97.6 F | SYSTOLIC BLOOD PRESSURE: 126 MMHG | DIASTOLIC BLOOD PRESSURE: 83 MMHG

## 2017-10-23 DIAGNOSIS — J44.9 CHRONIC OBSTRUCTIVE PULMONARY DISEASE, UNSPECIFIED COPD TYPE (HCC): ICD-10-CM

## 2017-10-23 DIAGNOSIS — R09.89 CHEST CONGESTION: Primary | ICD-10-CM

## 2017-10-23 RX ORDER — FEXOFENADINE HCL 30 MG/5 ML
SUSPENSION, ORAL (FINAL DOSE FORM) ORAL
Qty: 1 EACH | Refills: 1 | Status: SHIPPED | OUTPATIENT
Start: 2017-10-23 | End: 2022-03-15 | Stop reason: SDUPTHER

## 2017-10-23 RX ORDER — FLUTICASONE PROPIONATE AND SALMETEROL 500; 50 UG/1; UG/1
1 POWDER RESPIRATORY (INHALATION) EVERY 12 HOURS
Qty: 1 EACH | Refills: 3 | Status: SHIPPED | OUTPATIENT
Start: 2017-10-23 | End: 2018-02-15

## 2017-10-23 RX ORDER — GUAIFENESIN 600 MG/1
600 TABLET, EXTENDED RELEASE ORAL 2 TIMES DAILY
Qty: 60 TAB | Refills: 1 | Status: SHIPPED | OUTPATIENT
Start: 2017-10-23 | End: 2018-07-24
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
## 2017-10-23 NOTE — PROGRESS NOTES
ROOM # 1    Marty Kirby presents today for   Chief Complaint   Patient presents with    Cold Symptoms    Cough     rattling cough, but still nonproductive    Nasal Congestion       Torrey Sanchez preferred language for health care discussion is english/other. Is someone accompanying this pt? no    Is the patient using any DME equipment during OV? no    Depression Screening:  PHQ over the last two weeks 10/23/2017 5/11/2017 6/29/2016 2/25/2016 4/27/2015 4/28/2014   Little interest or pleasure in doing things Not at all Not at all Not at all Not at all Nearly every day Not at all   Feeling down, depressed or hopeless Not at all Not at all Not at all Not at all Nearly every day Not at all   Total Score PHQ 2 0 0 0 0 6 0       Learning Assessment:  Learning Assessment 5/3/2016 6/11/2015 11/26/2014 6/19/2014 5/29/2014 4/28/2014   PRIMARY LEARNER Patient Patient Patient Patient Patient Patient   HIGHEST LEVEL OF EDUCATION - PRIMARY LEARNER  DID NOT GRADUATE HIGH SCHOOL DID NOT GRADUATE HIGH SCHOOL - - DID NOT GRADUATE HIGH SCHOOL DID NOT GRADUATE [facility] LEARNER NONE NONE - - - [address] CAREGIVER - - - - - No   PRIMARY [facility]   LEARNER PREFERENCE PRIMARY DEMONSTRATION DEMONSTRATION DEMONSTRATION DEMONSTRATION OTHER (COMMENT) READING   ANSWERED BY patient  Patient patient patient patient Patient   RELATIONSHIP SELF SELF SELF SELF SELF SELF       Abuse Screening:  Abuse Screening Questionnaire 10/23/2017 6/11/2015   Do you ever feel afraid of your partner? N N   Are you in a relationship with someone who physically or mentally threatens you? N N   Is it safe for you to go home? Y Y       Fall Risk  No flowsheet data found. Health Maintenance reviewed and discussed per provider. Yes    Marty Kirby is due for shingles, vax, TDAP vax. Please order/place referral if appropriate. Advance Directive:  1. Do you have an advance directive in place? Patient Reply: no    2. If not, would you like material regarding how to put one in place? Patient Reply: no    Coordination of Care:  1. Have you been to the ER, urgent care clinic since your last visit? Hospitalized since your last visit? no    2. Have you seen or consulted any other health care providers outside of [facility] [place] since your last visit? Include any pap smears or colon screening.  no

## 2017-10-23 NOTE — MR AVS SNAPSHOT
Visit Information Date & Time Provider Department Dept. Phone Encounter #  
 10/23/2017 10:30 AM Casa Steiner MD [facility] [phone] [id number] Follow-up Instructions Return in about 1 month (around 11/23/2017) for f/u COPD. Your Appointments 10/23/2017 10:30 AM  
Office Visit with Casa Steiner MD  
[facility]) Appt Note: cold symptoms [address]  
  
   
 [address] [facility] Upcoming Health Maintenance Date Due DTaP/Tdap/Td series (1 - Tdap) 8/19/1978 ZOSTER VACCINE AGE 60> 6/19/2017 INFLUENZA AGE 9 TO ADULT 11/6/2017* FOBT Q 1 YEAR AGE 50-75 11/10/2017* PAP AKA CERVICAL CYTOLOGY 12/5/2017* MEDICARE YEARLY EXAM 5/12/2018 BREAST CANCER SCRN MAMMOGRAM 8/10/2019 *Topic was postponed. The date shown is not the original due date. Allergies as of 10/23/2017  Review Complete On: 10/23/2017 By: Casa Steiner MD  
  
 Severity Noted Reaction Type Reactions Ibuprofen Medium 05/01/2013   Side Effect Itching, Nausea Only, Swelling  
 swelling Neurontin [Gabapentin] Medium 08/18/2014   Side Effect Anxiety Current Immunizations  Reviewed on 8/30/2017 Name Date Influenza Vaccine 11/6/2014  1:50 PM, 10/17/2013, 10/13/2011 12:00 AM, 1/20/2011 Influenza Vaccine (Quad) PF 11/4/2015 Pneumococcal Polysaccharide (PPSV-23) 10/17/2013, 10/13/2011 Not reviewed this visit You Were Diagnosed With   
  
 Codes Comments Chest congestion    -  Primary ICD-10-CM: R09.89 ICD-9-CM: 576. 9 Chronic obstructive pulmonary disease, unspecified COPD type ([facility]     ICD-10-CM: J44.9 ICD-9-CM: 656 Vitals BP Pulse Temp Resp Height(growth percentile) Weight(growth percentile)  126/83 (BP 1 Location: Left arm, BP Patient Position: Sitting) 81 97.6 °F (36.4 °C) (Oral) 18 5' 3\" (1.6 m) 156 lb (70.8 kg) SpO2 BMI OB Status Smoking Status 99% 27.63 kg/m2 Postmenopausal Current Every Day Smoker Vitals History BMI and BSA Data Body Mass Index Body Surface Area  
 27.63 kg/m 2 1.77 m 2 Preferred Pharmacy Pharmacy Name Phone [facility] [phone] Your Updated Medication List  
  
   
This list is accurate as of: 10/23/17  9:49 AM.  Always use your most recent med list.  
  
  
  
  
 * albuterol 2.5 mg /3 mL (0.083 %) nebulizer solution Commonly known as:  PROVENTIL VENTOLIN  
USe 1 vial every 4-6 hours prn shortness of breath * albuterol 90 mcg/actuation inhaler Commonly known as:  PROVENTIL HFA, VENTOLIN HFA, PROAIR HFA Take 1-2 puffs every 4-6 hrs prn shortness of breath  
  
 aspirin 81 mg chewable tablet Take 1 Tab by mouth daily. baclofen 10 mg tablet Commonly known as:  LIORESAL  
take 1 tablet by mouth three times a day if needed for muscle spasm Blood-Glucose Meter monitoring kit Commonly known as:  FREESTYLE LITE METER Check fasting sugars before breakfast. Dx Code 790.21  
  
 cholecalciferol (vitamin D3) 2,000 unit Tab Commonly known as:  VITAMIN D3 Take 1 Tab by mouth daily. docusate sodium 100 mg capsule Commonly known as:  Maretta Watt Take 2 po qpm prn constipation  
  
 fluticasone-salmeterol 500-50 mcg/dose diskus inhaler Commonly known as:  ADVAIR Take 1 Puff by inhalation every twelve (12) hours. glucose blood VI test strips strip Commonly known as:  FREESTYLE LITE STRIPS Check fasting sugars before breakfast. Dx Code 790.21  
  
 guaiFENesin  mg ER tablet Commonly known as:  Vince [employer] Vince Take 1 Tab by mouth two (2) times a day. Indications: for chest congestion Humidifiers Misc Commonly known as:  [address] Use as directed. Please include the vicks solution Lancets Misc Check fasting sugars before breakfast. Dx Code 790.21. For freestyle lite meter. Nebulizer & Compressor machine Use as directed for COPD and asthma. COPD: J44.9  Asthma: J45.909 * Nebulizer Accessories Kit Use as directed. Pt needs refill of supplies for nebulizer machine. She needs new tubing and full face mask for nebulizer machine. * Nebulizer Accessories Kit Use as directed for COPD and asthma. COPD: J44.9  Asthma: J45.909  
  
 polyethylene glycol 17 gram packet Commonly known as:  Scot Fela Take 1 Packet by mouth daily as needed (constipation). simvastatin 10 mg tablet Commonly known as:  ZOCOR Take 1 Tab by mouth nightly. tiotropium 18 mcg inhalation capsule Commonly known as:  [address]  
inhale the contents of one capsule in the handihaler once daily  
  
 traMADol 50 mg tablet Commonly known as:  ULTRAM  
Take 1 Tab by mouth every six (6) hours as needed for Pain. traZODone 50 mg tablet Commonly known as:  Alma Rosa Au Take 2 Tabs by mouth nightly. * Notice: This list has 4 medication(s) that are the same as other medications prescribed for you. Read the directions carefully, and ask your doctor or other care provider to review them with you. Prescriptions Sent to Pharmacy Refills Humidifiers (VICKS HUMIDIFIER) misc 1 Sig: Use as directed. Please include the vicks solution Class: Normal  
 Pharmacy: [place] [address] Ph #: [phone]  
 fluticasone-salmeterol (ADVAIR) 500-50 mcg/dose diskus inhaler 3 Sig: Take 1 Puff by inhalation every twelve (12) hours. Class: Normal  
 [facility] [address] Ph #: [phone] Route: Inhalation  
 guaiFENesin ER (MUCINEX) 600 mg ER tablet 1 Sig: Take 1 Tab by mouth two (2) times a day. Indications: for chest congestion  Class: Normal  
 [facility]: [facility] [address] [place], [place] [address] [address]  #: [phone] Route: Oral  
  
Follow-up Instructions Return in about 1 month (around 2017) for f/u COPD. Patient Instructions 1) follow-up in 1 month or sooner if worsening symptoms. Introducing [facility] SERVICES! Tomasa Wilson introduces [employer] patient portal. Now you can access parts of your medical record, email your doctor's office, and request medication refills online. 1. In your internet browser, go to [email or web address] 2. Click on the First Time User? Click Here link in the Sign In box. You will see the New Member Sign Up page. 3. Enter your [employer] Access Code exactly as it appears below. You will not need to use this code after youve completed the sign-up process. If you do not sign up before the expiration date, you must request a new code. · [employer] Access Code: [email or web address] Expires: 2017  8:41 AM 
 
4. Enter the last four digits of your Social Security Number (xxxx) and Date of Birth (mm/dd/yyyy) as indicated and click Submit. You will be taken to the next sign-up page. 5. Create a [employer] ID. This will be your [employer] login ID and cannot be changed, so think of one that is secure and easy to remember. 6. Create a [employer] password. You can change your password at any time. 7. Enter your Password Reset Question and Answer. This can be used at a later time if you forget your password. 8. Enter your e-mail address. You will receive e-mail notification when new information is available in [address] 9. Click Sign Up. You can now view and download portions of your medical record. 10. Click the Download Summary menu link to download a portable copy of your medical information. If you have questions, please visit the Frequently Asked Questions section of the [employer] website.  Remember, [employer] is NOT to be used for urgent needs. For medical emergencies, dial 911. Now available from your iPhone and Android! Please provide this summary of care documentation to your next provider. Your primary care clinician is listed as Leonid Lee. If you have any questions after today's visit, please call [phone].

## 2017-11-21 DIAGNOSIS — Z76.0 MEDICATION REFILL: ICD-10-CM

## 2017-11-21 DIAGNOSIS — M25.551 BILATERAL HIP PAIN: ICD-10-CM

## 2017-11-21 DIAGNOSIS — M25.552 BILATERAL HIP PAIN: ICD-10-CM

## 2017-11-21 RX ORDER — TRAMADOL HYDROCHLORIDE 50 MG/1
50 TABLET ORAL
Qty: 120 TAB | Refills: 0 | Status: SHIPPED | OUTPATIENT
Start: 2017-11-21 | End: 2018-01-02 | Stop reason: SDUPTHER

## 2017-11-21 NOTE — TELEPHONE ENCOUNTER
UDS: 9/5/17  Pain agreement signed: 10/17/17    Please let pt know that 2 weeks prior to Dec. Refill, she will need to come to get UDS. The UDS is a send out so takes 7-10 days to get results back. Printed rx for:    Requested Prescriptions     Signed Prescriptions Disp Refills    traMADol (ULTRAM) 50 mg tablet 120 Tab 0     Sig: Take 1 Tab by mouth every six (6) hours as needed for Pain. Authorizing Provider: Addi Atkinson     Please let pt know that this is ready for .

## 2017-11-21 NOTE — TELEPHONE ENCOUNTER
Called pt informed of below pt stated understanding no other questions or concerns noted at this time.

## 2017-11-21 NOTE — TELEPHONE ENCOUNTER
Patient request, last OV 10/23/17, no future appt scheduled    Requested Prescriptions     Pending Prescriptions Disp Refills    traMADol (ULTRAM) 50 mg tablet 120 Tab 0     Sig: Take 1 Tab by mouth every six (6) hours as needed for Pain.

## 2017-11-21 NOTE — TELEPHONE ENCOUNTER
printed and placed in PCP medication refill review folder.      Last UDS date: n/a  Pain contract signed: n/a  Last office visit: 10/23/17  Next Appt: none

## 2017-12-19 DIAGNOSIS — Z76.0 MEDICATION REFILL: ICD-10-CM

## 2017-12-19 DIAGNOSIS — J44.9 CHRONIC OBSTRUCTIVE PULMONARY DISEASE, UNSPECIFIED COPD TYPE (HCC): ICD-10-CM

## 2017-12-19 RX ORDER — ALBUTEROL SULFATE 90 UG/1
AEROSOL, METERED RESPIRATORY (INHALATION)
Qty: 18 INHALER | Refills: 3 | Status: SHIPPED | OUTPATIENT
Start: 2017-12-19 | End: 2018-02-15 | Stop reason: SDUPTHER

## 2017-12-21 ENCOUNTER — LAB ONLY (OUTPATIENT)
Dept: INTERNAL MEDICINE CLINIC | Age: 62
End: 2017-12-21

## 2017-12-21 ENCOUNTER — HOSPITAL ENCOUNTER (OUTPATIENT)
Dept: LAB | Age: 62
Discharge: HOME | End: 2017-12-21
Payer: MEDICARE

## 2017-12-21 DIAGNOSIS — G89.29 ENCOUNTER FOR CHRONIC PAIN MANAGEMENT: ICD-10-CM

## 2017-12-21 DIAGNOSIS — G89.29 ENCOUNTER FOR CHRONIC PAIN MANAGEMENT: Primary | ICD-10-CM

## 2017-12-21 PROCEDURE — G0480 DRUG TEST DEF 1-7 CLASSES: HCPCS | Performed by: INTERNAL MEDICINE

## 2018-01-01 ENCOUNTER — TELEPHONE (OUTPATIENT)
Dept: INTERNAL MEDICINE CLINIC | Age: 63
End: 2018-01-01

## 2018-01-01 DIAGNOSIS — M25.552 BILATERAL HIP PAIN: ICD-10-CM

## 2018-01-01 DIAGNOSIS — Z76.0 MEDICATION REFILL: ICD-10-CM

## 2018-01-01 DIAGNOSIS — M25.551 BILATERAL HIP PAIN: ICD-10-CM

## 2018-01-01 NOTE — LETTER
Name:Denia Lovelace QTT:[birth year] MR #:[id number] Office:[facility] Page 1 of 5 CONTROLLED SUBSTANCE AGREEMENT I may be prescribed medications that are controlled substances as part  of my treatment plan for management of my medical condition(s). The goal of my treatment plan is to maintain and/or improve my health and wellbeing. Because controlled substances have an increased risk of abuse or harm, continual re-evaluation is needed determine if the goals of my treatment plan are being met for my safety and the safety of others. I, Craig Kramer  am entering into this Controlled Substance Agreement with my provider, _Dr. Lee_______________________ at [address] . I understand that successful treatment requires mutual trust and honesty between me and my provider. I understand that there are state and federal laws and regulations which apply to the medications that my provider may prescribe that must be followed. I understand there are risks and benefits ts of taking the medicines that my provider may prescribe. I understand and agree that following this Agreement is necessary in continuing my provider-patient relationship and success of my treatment plan. As a part of my treatment plan, I agree to the following: COMMUNICATION: 
 
1. I will communicate fully with my provider about my medical condition(s), including the effect on my daily life and how well my medications are helping. I will tell my provider all of the medications that I take for any reason, including medications I receive from another health care provider, and will notify my provider about all issues, problems or concerns, including any side effects, which may be related to my medications. I understand that this information allows my provider to adjust my treatment plan to help manage my medical condition.  I understand that this information will become part of my permanent medical record. 2. I will notify my provider if I have a history of alcohol/drug misuse/addiction or if I have had treatment for alcohol/drug addiction in the past, or if I have a new problem with or concern about alcohol/drug use/addiction, because this increases the likelihood of high risk behaviors and may lead to serious medical conditions. 3. Females Only: I will notify my provider if I am or become pregnant, or if I intend to become pregnant, or if I intend to breastfeed. I understand that communication of these issues with my provider is important, due to possible effects my medication could have on an unborn fetus or breastfeeding child. Initials_____ Name:Denia Spence FPA:[birth year] MR #:[id number] Office:[facility] Page 2 of 5 MISUSE OF MEDICATIONS / DRUGS: 
 
1. I agree to take all controlled substances as prescribed, and will not misuse or abuse any controlled substances prescribed by my provider. For my safety, I will not increase the amount of medicine I take without first talking with and getting permission from my provider. 2. If I have a medical emergency, another health care provider may prescribe me medication. If I seek emergency treatment, I will notify my provider within seventy-two (72) hours. 3. I understand that my provider may discuss my use and/or possible misuse/abuse of controlled substances and alcohol, as appropriate, with any health care provider involved in my care, pharmacist or legal authority. ILLEGAL DRUGS: 
 
1. I will not use illegal drugs of any kind, including but not limited to marijuana, heroin, cocaine, or any prescription drug which is not prescribed to me. DRUG DIVERSION / PRESCRIPTION FRAUD: 
 
1. I will not share, sell, trade, give away, or otherwise misuse my prescriptions or medications. 2. I will not alter any prescriptions provided to me by my provider. SINGLE PROVIDER: 
 
1. I agree that all controlled substances that I take will be prescribed only by my provider (or his/her covering provider) under this Agreement. This agreement does not prevent me from seeking emergency medical treatment or receiving pain management related to a surgery. PROTECTING MEDICATIONS: 
 
1. I am responsible for keeping my prescriptions and medications in a safe and secure place including safeguarding them from loss or theft. I understand that lost, stolen or damaged/destroyed prescriptions or medications will not be replaced. Initials____ Name:Denia Starks SQB:[birth year] MR #:[id number] Office:[facility] Page 3 of 5 PRESCRIPTION RENEWALS/REFILLS: 
 
1. I will follow my controlled substance medication schedule as prescribed by my provider. 2. I understand and agree that I will make any requests for renewals or refills of my prescriptions only at the time of an office visit or during my providers regular office hours subject to the prescription refill requirements of the individual practice. 3. I understand that my provider may not call in prescriptions for controlled substances to my pharmacy. 4. I understand that my provider may adjust or discontinue these medications as deemed appropriate for my medical treatment plan. This Agreement does not guarantee the prescription of controlled medications. 5. I agree that if my medications are adjusted or discontinued, I will properly dispose of any remaining medications. I understand that I will be required to dispose of any remaining controlled medications prior to being provided with any prescriptions for other controlled medications.  
 
6. I understand that the renewal of my prescription depends on my medical condition, my consistent participation, and my adherence with my treatment plan and this Agreement. 7. I understand that if I do not keep an appointment with my provider, I may not receive a renewal or refill for my controlled substance medication. PRESCRIPTION MONITORING / DRUG TESTIN. I understand that my provider may require me to provide urine, saliva or blood for testing at any time. I understand that this testing will be used to monitor for safety and adherence with my treatment plan and this Agreement. 2. I understand that my provider may ask me to provide an observed urine specimen, which means that a nurse or other health care provider may watch me provide urine, and I agree to cooperate if I am asked to provide an observed specimen. 3. I understand that if I do not provide urine, saliva or blood samples within two (2) hours of my providers request, or other timeframe decided by my provider, my treatment plan could be changed, or my prescriptions and medications may be changed or ended. 4. I understand that urine, saliva and blood test results will be a part of my permanent medical record. Initials_____ Name:Denia Perez YGP: MR #:[id number] Office:[facility] Page 4 of 5 
 
 
1. I authorize my provider and my pharmacy to cooperate fully with any local, state, or federal law enforcement agency in the investigation of any possible misuse, sale, or other diversion of my controlled substance prescriptions or medications. RISKS: 
 
1. I understand that my level of consciousness may be affected from the use of controlled substances, and I understand that there are risks, benefits, effects and potential alternatives (including no treatment) to the medications that my provider has prescribed. 2. I understand that I may become drowsy, tired, dizzy, constipated, and sick to my stomach, or have changes in my mood or in my sleep while taking my medications. I have talked with my provider about these possible side effects, risks, benefits, and alternative treatments, and my provider has answered all of my questions. 3. I understand that I should not suddenly stop taking my medications without first speaking with my provider. I understand that if I suddenly stop taking my medications, I may experience nausea, vomiting, sweating,anxiety, sleeplessness, itching or other uncomfortable feelings. 4. I will not take my medications with alcohol of any kind, including beer, wine or liquor. I understand that drinking alcohol with my medications increases the chances of side effects, including breathing problems or even death. 5. I understand that if I have a history of alcoholism or other drug addiction I may be at increased risk of addiction to my medications. Signs of addiction might include craving, compulsive use, and continued use despite harm. Since addiction is a disease, I understand my provider may decide to change my medications and refer me to appropriate treatment services. I understand that this information would become part of my permanent medical record. Initials_____ Name:AbdoulTorrey Gundersonroshan [facility]:[birth year] MR #:[id number] Office:[facility] Page 5 of 5  
 
 
6. Females only: Children born to women who regularly take controlled substances are likely to have physical problems and suffer withdrawal symptoms at birth. If I am of child-bearing age, I understand that I should use safe and effective birth control while taking any controlled substances to avoid the impact of medications on an unborn fetus or  child. I agree to notify my provider immediately if I should become pregnant so that my treatment plan can be adjusted. 7. Males only: I understand that chronic use of controlled substances has been associated with low testosterone levels in males which may affect my mood, stamina, sexual desire, and general health. I understand that my provider may order the appropriate laboratory test to determine my testosterone level,and I agree to this testing. ADHERENCE: 
 
1. I understand that if I do not adhere to this Agreement in any way, my provider may change my prescriptions, stop prescribing controlled substances or end our provider-patient relationship. 2. If my provider decides to stop prescribing medication, or decides to end our provider-patient relationship,my provider may require that I taper my medications slowly. If necessary, my provider may also provide a prescription for other medications to treat my withdrawal symptoms. UNDERSTANDING THIS AGREEMENT: 
 
I understand that my provider may adjust or stop my prescriptions for controlled substances based on my medical condition and my treatment plan. I understand that this Agreement does not guarantee that I will be prescribed medications or controlled substances. I understand that controlled substances may be just one part 
of my treatment plan.  
 
My initial on each page and my signature below shows that I have read each page of this Agreement, I have had an opportunity to ask questions, and all of my questions have been answered to my satisfaction by my provider. By signing below, I agree to comply with this Agreement, and I understand that if I do not follow the Agreements listed above, my provider may stop 
 
_________________________________________  Date/Time 1/2/2018 8:27 AM   
             (Patient Signature) 
 
________________________________________    Date/Time 1/2/2018 8:27 AM 
 (Parent or Guardian Signature if <18 yrs) 
 
_________________________________________ Date/Time 1/2/2018 8:27 AM 
 (Provider Signature) Preferred pharmacy:

## 2018-01-01 NOTE — TELEPHONE ENCOUNTER
Does pt require refill of tramadol? If so, UDS was okay and showed positive for tramadol and it's metabolites which is expected. She will need to sign an update pain agreement prior to getting refill. She will also need to make a f/u appt. With me for her COPD.

## 2018-01-02 RX ORDER — TRAMADOL HYDROCHLORIDE 50 MG/1
50 TABLET ORAL
Qty: 120 TAB | Refills: 0 | Status: SHIPPED | OUTPATIENT
Start: 2018-01-02 | End: 2018-02-15 | Stop reason: SDUPTHER

## 2018-01-02 NOTE — TELEPHONE ENCOUNTER
2 pt. Identifiers confirmed. Pt. Notified of below. She states once she finds transportation (within the next couple days) she will be in to sign contract, schedule aptmt, and  Rx. No other questions/concerns at this time.

## 2018-01-02 NOTE — TELEPHONE ENCOUNTER
Printed rx for:    Requested Prescriptions     Signed Prescriptions Disp Refills    traMADol (ULTRAM) 50 mg tablet 120 Tab 0     Sig: Take 1 Tab by mouth every six (6) hours as needed for Pain. Authorizing Provider: Girish Haynes PMP and no aberrancies seen. UDS on 12/21/17. Pain agreement: pt will sign.

## 2018-01-03 LAB — TOXASSURE SELECT 13: NORMAL

## 2018-01-30 DIAGNOSIS — Z76.0 MEDICATION REFILL: ICD-10-CM

## 2018-01-30 RX ORDER — ALBUTEROL SULFATE 0.83 MG/ML
SOLUTION RESPIRATORY (INHALATION)
Qty: 3 PACKAGE | Refills: 3 | Status: SHIPPED | OUTPATIENT
Start: 2018-01-30 | End: 2019-01-11 | Stop reason: SDUPTHER

## 2018-01-30 NOTE — TELEPHONE ENCOUNTER
Requested Prescriptions     Pending Prescriptions Disp Refills    albuterol (PROVENTIL VENTOLIN) 2.5 mg /3 mL (0.083 %) nebulizer solution 3 Package 3     Sig: USe 1 vial every 4-6 hours prn shortness of breath

## 2018-02-15 ENCOUNTER — OFFICE VISIT (OUTPATIENT)
Dept: INTERNAL MEDICINE CLINIC | Age: 63
End: 2018-02-15

## 2018-02-15 ENCOUNTER — HOSPITAL ENCOUNTER (OUTPATIENT)
Dept: LAB | Age: 63
Discharge: HOME | End: 2018-02-15
Payer: MEDICARE

## 2018-02-15 VITALS
HEIGHT: 63 IN | DIASTOLIC BLOOD PRESSURE: 78 MMHG | SYSTOLIC BLOOD PRESSURE: 115 MMHG | BODY MASS INDEX: 26.36 KG/M2 | WEIGHT: 148.8 LBS | RESPIRATION RATE: 15 BRPM | HEART RATE: 76 BPM | TEMPERATURE: 97.6 F | OXYGEN SATURATION: 97 %

## 2018-02-15 DIAGNOSIS — E78.5 DYSLIPIDEMIA: ICD-10-CM

## 2018-02-15 DIAGNOSIS — M89.9 BONE DISORDER: ICD-10-CM

## 2018-02-15 DIAGNOSIS — M25.552 BILATERAL HIP PAIN: ICD-10-CM

## 2018-02-15 DIAGNOSIS — M25.551 BILATERAL HIP PAIN: ICD-10-CM

## 2018-02-15 DIAGNOSIS — R73.01 ELEVATED FASTING GLUCOSE: ICD-10-CM

## 2018-02-15 DIAGNOSIS — Z76.0 MEDICATION REFILL: ICD-10-CM

## 2018-02-15 DIAGNOSIS — J44.9 CHRONIC OBSTRUCTIVE PULMONARY DISEASE, UNSPECIFIED COPD TYPE (HCC): ICD-10-CM

## 2018-02-15 DIAGNOSIS — R41.3 MEMORY LOSS: Primary | ICD-10-CM

## 2018-02-15 DIAGNOSIS — R41.3 MEMORY LOSS: ICD-10-CM

## 2018-02-15 LAB
25(OH)D3 SERPL-MCNC: 26.3 NG/ML (ref 30–100)
ALBUMIN SERPL-MCNC: 3.7 G/DL (ref 3.4–5)
ALBUMIN/GLOB SERPL: 1.2 {RATIO} (ref 0.8–1.7)
ALP SERPL-CCNC: 60 U/L (ref 45–117)
ALT SERPL-CCNC: 19 U/L (ref 13–56)
ANION GAP SERPL CALC-SCNC: 8 MMOL/L (ref 3–18)
AST SERPL-CCNC: 20 U/L (ref 15–37)
BILIRUB SERPL-MCNC: 0.4 MG/DL (ref 0.2–1)
BUN SERPL-MCNC: 8 MG/DL (ref 7–18)
BUN/CREAT SERPL: 9 (ref 12–20)
CALCIUM SERPL-MCNC: 8.7 MG/DL (ref 8.5–10.1)
CHLORIDE SERPL-SCNC: 106 MMOL/L (ref 100–108)
CHOLEST SERPL-MCNC: 131 MG/DL
CO2 SERPL-SCNC: 28 MMOL/L (ref 21–32)
CREAT SERPL-MCNC: 0.85 MG/DL (ref 0.6–1.3)
ERYTHROCYTE [DISTWIDTH] IN BLOOD BY AUTOMATED COUNT: 14.5 % (ref 11.6–14.5)
EST. AVERAGE GLUCOSE BLD GHB EST-MCNC: ABNORMAL MG/DL
GLOBULIN SER CALC-MCNC: 3.1 G/DL (ref 2–4)
GLUCOSE SERPL-MCNC: 84 MG/DL (ref 74–99)
HBA1C MFR BLD: <3.5 % (ref 4.2–5.6)
HCT VFR BLD AUTO: 42 % (ref 35–45)
HDLC SERPL-MCNC: 76 MG/DL (ref 40–60)
HDLC SERPL: 1.7 {RATIO} (ref 0–5)
HGB BLD-MCNC: 13.6 G/DL (ref 12–16)
LDLC SERPL CALC-MCNC: 45.4 MG/DL (ref 0–100)
LIPID PROFILE,FLP: ABNORMAL
MCH RBC QN AUTO: 29.1 PG (ref 24–34)
MCHC RBC AUTO-ENTMCNC: 32.4 G/DL (ref 31–37)
MCV RBC AUTO: 89.7 FL (ref 74–97)
PLATELET # BLD AUTO: 211 K/UL (ref 135–420)
PMV BLD AUTO: 10 FL (ref 9.2–11.8)
POTASSIUM SERPL-SCNC: 3.9 MMOL/L (ref 3.5–5.5)
PROT SERPL-MCNC: 6.8 G/DL (ref 6.4–8.2)
RBC # BLD AUTO: 4.68 M/UL (ref 4.2–5.3)
RPR SER QL: NONREACTIVE
SODIUM SERPL-SCNC: 142 MMOL/L (ref 136–145)
TRIGL SERPL-MCNC: 48 MG/DL (ref ?–150)
TSH SERPL DL<=0.05 MIU/L-ACNC: 0.87 UIU/ML (ref 0.36–3.74)
VIT B12 SERPL-MCNC: 490 PG/ML (ref 211–911)
VLDLC SERPL CALC-MCNC: 9.6 MG/DL
WBC # BLD AUTO: 6.9 K/UL (ref 4.6–13.2)

## 2018-02-15 PROCEDURE — 85027 COMPLETE CBC AUTOMATED: CPT | Performed by: INTERNAL MEDICINE

## 2018-02-15 PROCEDURE — 83036 HEMOGLOBIN GLYCOSYLATED A1C: CPT | Performed by: INTERNAL MEDICINE

## 2018-02-15 PROCEDURE — 82306 VITAMIN D 25 HYDROXY: CPT | Performed by: INTERNAL MEDICINE

## 2018-02-15 PROCEDURE — 80053 COMPREHEN METABOLIC PANEL: CPT | Performed by: INTERNAL MEDICINE

## 2018-02-15 PROCEDURE — 80061 LIPID PANEL: CPT | Performed by: INTERNAL MEDICINE

## 2018-02-15 PROCEDURE — 84443 ASSAY THYROID STIM HORMONE: CPT | Performed by: INTERNAL MEDICINE

## 2018-02-15 PROCEDURE — 86592 SYPHILIS TEST NON-TREP QUAL: CPT | Performed by: INTERNAL MEDICINE

## 2018-02-15 PROCEDURE — 36415 COLL VENOUS BLD VENIPUNCTURE: CPT | Performed by: INTERNAL MEDICINE

## 2018-02-15 PROCEDURE — 82607 VITAMIN B-12: CPT | Performed by: INTERNAL MEDICINE

## 2018-02-15 RX ORDER — ALBUTEROL SULFATE 90 UG/1
AEROSOL, METERED RESPIRATORY (INHALATION)
Qty: 18 INHALER | Refills: 3 | Status: SHIPPED | OUTPATIENT
Start: 2018-02-15 | End: 2018-03-13 | Stop reason: SDUPTHER

## 2018-02-15 RX ORDER — OMEPRAZOLE 20 MG/1
CAPSULE, DELAYED RELEASE ORAL
Refills: 0 | COMMUNITY
Start: 2018-01-10 | End: 2018-07-24

## 2018-02-15 RX ORDER — MONTELUKAST SODIUM 10 MG/1
TABLET ORAL
Refills: 0 | COMMUNITY
Start: 2018-01-10 | End: 2018-10-07 | Stop reason: SDUPTHER

## 2018-02-15 RX ORDER — TRAMADOL HYDROCHLORIDE 50 MG/1
50 TABLET ORAL
Qty: 120 TAB | Refills: 0 | Status: SHIPPED | OUTPATIENT
Start: 2018-02-15 | End: 2018-03-26 | Stop reason: SDUPTHER

## 2018-02-15 RX ORDER — BUDESONIDE AND FORMOTEROL FUMARATE DIHYDRATE 160; 4.5 UG/1; UG/1
AEROSOL RESPIRATORY (INHALATION)
Refills: 0 | COMMUNITY
Start: 2018-01-11 | End: 2018-10-01 | Stop reason: SDUPTHER

## 2018-02-15 NOTE — PROGRESS NOTES
ROOM # 1    Constance Lino presents today for   Chief Complaint   Patient presents with    Medication Refill    COPD     f/u     Requested Prescriptions     Pending Prescriptions Disp Refills    traMADol (ULTRAM) 50 mg tablet 120 Tab 0     Sig: Take 1 Tab by mouth every six (6) hours as needed for Pain. Constance Lino preferred language for health care discussion is english/other. Is someone accompanying this pt? no    Is the patient using any DME equipment during OV? no    Depression Screening:  PHQ over the last two weeks 2/15/2018 10/23/2017 5/11/2017 6/29/2016 2/25/2016 4/27/2015 4/28/2014   Little interest or pleasure in doing things Not at all Not at all Not at all Not at all Not at all Nearly every day Not at all   Feeling down, depressed or hopeless Not at all Not at all Not at all Not at all Not at all Nearly every day Not at all   Total Score PHQ 2 0 0 0 0 0 6 0       Learning Assessment:  Learning Assessment 5/3/2016 6/11/2015 11/26/2014 6/19/2014 5/29/2014 4/28/2014   PRIMARY LEARNER Patient Patient Patient Patient Patient Patient   HIGHEST LEVEL OF EDUCATION - PRIMARY LEARNER  DID NOT GRADUATE HIGH SCHOOL DID NOT GRADUATE HIGH SCHOOL - - DID NOT GRADUATE HIGH SCHOOL DID NOT GRADUATE [facility] LEARNER NONE NONE - - - [address] CAREGIVER - - - - - No   PRIMARY [facility]   LEARNER PREFERENCE PRIMARY DEMONSTRATION DEMONSTRATION DEMONSTRATION DEMONSTRATION OTHER (COMMENT) READING   ANSWERED BY patient  Patient patient patient patient Patient   RELATIONSHIP SELF SELF SELF SELF SELF SELF       Abuse Screening:  Abuse Screening Questionnaire 2/15/2018 10/23/2017 6/11/2015   Do you ever feel afraid of your partner? N N N   Are you in a relationship with someone who physically or mentally threatens you? N N N   Is it safe for you to go home? Y Y Y       Fall Risk  No flowsheet data found.     Health Maintenance reviewed and discussed per provider. Yes    Pepe Terry is due for pap smear (pt. To schedule aptmt), FOBT, shingles vax, TDAP vax. Please order/place referral if appropriate. Advance Directive:  1. Do you have an advance directive in place? Patient Reply: no    2. If not, would you like material regarding how to put one in place? Patient Reply: no    Coordination of Care:  1. Have you been to the ER, urgent care clinic since your last visit? Hospitalized since your last visit? no    2. Have you seen or consulted any other health care providers outside of the [address] [place] [address] since your last visit? Include any pap smears or colon screening.  no

## 2018-02-15 NOTE — MR AVS SNAPSHOT
[facility] 
 
 
 [address] [address] [phone] Patient: Linda Serna MRN: [id number] [facility]:[birth year]88[birth year]82 Visit Information Date & Time Provider Department Dept. Phone Encounter #  
 2/15/2018 11:15 AM Noelle Huynh President, MD [address] [phone] [id number] Follow-up Instructions Return in about 3 months (around 5/15/2018) for f/u HTN, f/u copd. Your Appointments 2/15/2018 11:15 AM  
Office Visit with MD Cristiano Molina [address]) Appt Note: med check; Confirmed patient appointment 2-14-18 [facility]  
 [address]  
  
   
 [address] [facility] Upcoming Health Maintenance Date Due DTaP/Tdap/Td series (1 - Tdap) 8/19/1978 FOBT Q 1 YEAR AGE 50-75 9/23/2015 PAP AKA CERVICAL CYTOLOGY 6/21/2016 ZOSTER VACCINE AGE 60> 6/19/2017 MEDICARE YEARLY EXAM 5/12/2018 BREAST CANCER SCRN MAMMOGRAM 8/10/2019 Allergies as of 2/15/2018  Review Complete On: 2/15/2018 By: Sonja Miller MD  
  
 Severity Noted Reaction Type Reactions Ibuprofen Medium 05/01/2013   Side Effect Itching, Nausea Only, Swelling  
 swelling Neurontin [Gabapentin] Medium 08/18/2014   Side Effect Anxiety Current Immunizations  Reviewed on 8/30/2017 Name Date Influenza Vaccine 11/6/2014  1:50 PM, 10/17/2013, 10/13/2011 12:00 AM, 1/20/2011 Influenza Vaccine (Quad) PF 11/4/2015 Pneumococcal Polysaccharide (PPSV-23) 10/17/2013, 10/13/2011 Not reviewed this visit You Were Diagnosed With   
  
 Codes Comments Memory loss    -  Primary ICD-10-CM: R41.3 ICD-9-CM: 780.93 Medication refill     ICD-10-CM: Z76.0 ICD-9-CM: V68.1 Bilateral hip pain     ICD-10-CM: M25.551, M25.552 ICD-9-CM: 719.45   
 Chronic obstructive pulmonary disease, unspecified COPD type ([facility]     ICD-10-CM: J44.9 ICD-9-CM: 932 Dyslipidemia     ICD-10-CM: E78.5 ICD-9-CM: 272.4 Elevated fasting glucose     ICD-10-CM: R73.01 
ICD-9-CM: 790.21 Bone disorder     ICD-10-CM: M89.9 ICD-9-CM: 733.90 Vitals BP Pulse Temp Resp Height(growth percentile) Weight(growth percentile) 115/78 (BP 1 Location: Left arm, BP Patient Position: Sitting) 76 97.6 °F (36.4 °C) (Oral) 15 5' 3\" (1.6 m) 148 lb 12.8 oz (67.5 kg) SpO2 BMI OB Status Smoking Status 97% 26.36 kg/m2 Postmenopausal Current Every Day Smoker Vitals History BMI and BSA Data Body Mass Index Body Surface Area  
 26.36 kg/m 2 1.73 m 2 Preferred Pharmacy Pharmacy Name Phone [address] [phone] Your Updated Medication List  
  
   
This list is accurate as of: 2/15/18 11:14 AM.  Always use your most recent med list.  
  
  
  
  
 * albuterol 2.5 mg /3 mL (0.083 %) nebulizer solution Commonly known as:  PROVENTIL VENTOLIN  
USe 1 vial every 4-6 hours prn shortness of breath * albuterol 90 mcg/actuation inhaler Commonly known as:  VENTOLIN HFA  
inhale 1 to 2 puffs every 4 to 6 hours if needed for shortness of breath  
  
 aspirin 81 mg chewable tablet Take 1 Tab by mouth daily. baclofen 10 mg tablet Commonly known as:  LIORESAL  
take 1 tablet by mouth three times a day if needed for muscle spasm Blood-Glucose Meter monitoring kit Commonly known as:  FREESTYLE LITE METER Check fasting sugars before breakfast. Dx Code 790.21  
  
 cholecalciferol (vitamin D3) 2,000 unit Tab Commonly known as:  VITAMIN D3 Take 1 Tab by mouth daily. docusate sodium 100 mg capsule Commonly known as:  Antwan Domínguez Take 2 po qpm prn constipation  
  
 glucose blood VI test strips strip Commonly known as:  FREESTYLE LITE STRIPS  
 Check fasting sugars before breakfast. Dx Code 790.21  
  
 guaiFENesin  mg ER tablet Commonly known as:  Vince & Vince Take 1 Tab by mouth two (2) times a day. Indications: for chest congestion Humidifiers Misc Commonly known as:  [address] Use as directed. Please include the vicks solution Lancets Misc Check fasting sugars before breakfast. Dx Code 790.21. For freestyle lite meter. montelukast 10 mg tablet Commonly known as:  SINGULAIR Nebulizer & Compressor machine Use as directed for COPD and asthma. COPD: J44.9  Asthma: J45.909 * Nebulizer Accessories Kit Use as directed. Pt needs refill of supplies for nebulizer machine. She needs new tubing and full face mask for nebulizer machine. * Nebulizer Accessories Kit Use as directed for COPD and asthma. COPD: J44.9  Asthma: J45.909  
  
 omeprazole 20 mg capsule Commonly known as:  PRILOSEC  
  
 polyethylene glycol 17 gram packet Commonly known as:  Cris Arts Take 1 Packet by mouth daily as needed (constipation). simvastatin 10 mg tablet Commonly known as:  ZOCOR Take 1 Tab by mouth nightly. SYMBICORT 160-4.5 mcg/actuation Hfaa Generic drug:  budesonide-formoterol  
  
 tiotropium 18 mcg inhalation capsule Commonly known as:  [address]  
inhale the contents of one capsule in the handihaler once daily  
  
 traMADol 50 mg tablet Commonly known as:  ULTRAM  
Take 1 Tab by mouth every six (6) hours as needed for Pain. traZODone 50 mg tablet Commonly known as:  Nahid Span Take 2 Tabs by mouth nightly. * Notice: This list has 4 medication(s) that are the same as other medications prescribed for you. Read the directions carefully, and ask your doctor or other care provider to review them with you. Prescriptions Printed Refills  
 traMADol (ULTRAM) 50 mg tablet 0 Sig: Take 1 Tab by mouth every six (6) hours as needed for Pain. Class: Print Route: Oral  
  
Prescriptions Sent to Pharmacy Refills  
 albuterol (VENTOLIN HFA) 90 mcg/actuation inhaler 3 Sig: inhale 1 to 2 puffs every 4 to 6 hours if needed for shortness of breath Class: Normal  
 Pharmacy: [facility] [address] [place] [address] [address]  #: [phone] Follow-up Instructions Return in about 3 months (around 5/15/2018) for f/u HTN, f/u copd. To-Do List   
 02/15/2018 Lab:  CBC W/O DIFF   
  
 02/15/2018 Lab:  HEMOGLOBIN A1C WITH EAG   
  
 02/15/2018 Lab:  LIPID PANEL   
  
 02/15/2018 Lab:  METABOLIC PANEL, COMPREHENSIVE   
  
 02/15/2018 Lab:  RPR   
  
 02/15/2018 Lab:  TSH 3RD GENERATION   
  
 02/15/2018 Lab:  VITAMIN B12   
  
 02/15/2018 Lab:  VITAMIN D, 25 HYDROXY Patient Instructions 1) stop the advair. 2) continue the symbicort, spriva and albuterol. 3) follow-up in 3 months or sooner if worsening symptoms. Introducing [facility]! Darlin Hernandez introduces [employer] patient portal. Now you can access parts of your medical record, email your doctor's office, and request medication refills online. 1. In your internet browser, go to [email or web address] 2. Click on the First Time User? Click Here link in the Sign In box. You will see the New Member Sign Up page. 3. Enter your [employer] Access Code exactly as it appears below. You will not need to use this code after youve completed the sign-up process. If you do not sign up before the expiration date, you must request a new code. · [employer] Access Code: [email or web address] Expires: 2/17/2018  3:21 PM 
 
4. Enter the last four digits of your Social Security Number (xxxx) and Date of Birth (mm/dd/yyyy) as indicated and click Submit. You will be taken to the next sign-up page. 5. Create a [employer] ID.  This will be your [employer] login ID and cannot be changed, so think of one that is secure and easy to remember. 6. Create a [employer] password. You can change your password at any time. 7. Enter your Password Reset Question and Answer. This can be used at a later time if you forget your password. 8. Enter your e-mail address. You will receive e-mail notification when new information is available in [address] 9. Click Sign Up. You can now view and download portions of your medical record. 10. Click the Download Summary menu link to download a portable copy of your medical information. If you have questions, please visit the Frequently Asked Questions section of the [employer] website. Remember, [employer] is NOT to be used for urgent needs. For medical emergencies, dial 911. Now available from your iPhone and Android! Please provide this summary of care documentation to your next provider. Your primary care clinician is listed as Leonid Lee. If you have any questions after today's visit, please call [phone].
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
## 2018-02-15 NOTE — PATIENT INSTRUCTIONS
1) stop the advair. 2) continue the symbicort, spriva and albuterol. 3) follow-up in 3 months or sooner if worsening symptoms.

## 2018-02-16 NOTE — PROGRESS NOTES
Please let pt know that labs were normal except:    1) vitamin D is low. Increase vitamin D3 2000 international units  Take 2 po daily. 2) does she want to proceed with head CT or MRI of head to determine etiology of her memory loss?

## 2018-02-21 ENCOUNTER — TELEPHONE (OUTPATIENT)
Dept: INTERNAL MEDICINE CLINIC | Age: 63
End: 2018-02-21

## 2018-02-21 NOTE — TELEPHONE ENCOUNTER
----- Message from Temo Chauhan MD sent at 2/16/2018  3:47 PM EST -----  Please let pt know that labs were normal except:    1) vitamin D is low. Increase vitamin D3 2000 international units  Take 2 po daily. 2) does she want to proceed with head CT or MRI of head to determine etiology of her memory loss?

## 2018-02-21 NOTE — TELEPHONE ENCOUNTER
2 pt. Identifiers confirmed. Pt. Notified of below. She states she does not want the imaging right now. She was advised to give us a call if she changes her mind. No other questions/concerns at this time.

## 2018-03-09 DIAGNOSIS — Z76.0 MEDICATION REFILL: ICD-10-CM

## 2018-03-09 RX ORDER — TRAZODONE HYDROCHLORIDE 50 MG/1
TABLET ORAL
Qty: 60 TAB | Refills: 3 | Status: SHIPPED | OUTPATIENT
Start: 2018-03-09 | End: 2018-05-11 | Stop reason: SDUPTHER

## 2018-03-13 DIAGNOSIS — Z76.0 MEDICATION REFILL: ICD-10-CM

## 2018-03-13 DIAGNOSIS — J44.9 CHRONIC OBSTRUCTIVE PULMONARY DISEASE, UNSPECIFIED COPD TYPE (HCC): ICD-10-CM

## 2018-03-13 RX ORDER — ALBUTEROL SULFATE 90 UG/1
AEROSOL, METERED RESPIRATORY (INHALATION)
Qty: 18 INHALER | Refills: 3 | Status: SHIPPED | OUTPATIENT
Start: 2018-03-13 | End: 2018-04-12 | Stop reason: SDUPTHER

## 2018-03-13 NOTE — TELEPHONE ENCOUNTER
Requested Prescriptions     Pending Prescriptions Disp Refills    albuterol (VENTOLIN HFA) 90 mcg/actuation inhaler 18 Inhaler 3     Sig: inhale 1 to 2 puffs every 4 to 6 hours if needed for shortness of breath

## 2018-03-26 DIAGNOSIS — M25.551 BILATERAL HIP PAIN: ICD-10-CM

## 2018-03-26 DIAGNOSIS — M25.552 BILATERAL HIP PAIN: ICD-10-CM

## 2018-03-26 DIAGNOSIS — Z76.0 MEDICATION REFILL: ICD-10-CM

## 2018-03-26 NOTE — TELEPHONE ENCOUNTER
printed and placed in PCP medication refill review folder.      Last UDS date:   Pain contract signed:   Next Appt:  Last Appt:

## 2018-03-27 RX ORDER — TRAMADOL HYDROCHLORIDE 50 MG/1
50 TABLET ORAL
Qty: 120 TAB | Refills: 0 | Status: SHIPPED | OUTPATIENT
Start: 2018-03-27 | End: 2018-05-07 | Stop reason: SDUPTHER

## 2018-03-27 NOTE — TELEPHONE ENCOUNTER
Checked  and no aberrancies seen. UDS: 12/21/17  Pain agreement signed on: 1/2/18. Printed rx for:    Requested Prescriptions     Signed Prescriptions Disp Refills    traMADol (ULTRAM) 50 mg tablet 120 Tab 0     Sig: Take 1 Tab by mouth every six (6) hours as needed for Pain. Authorizing Provider: Yue Said     Please let pt know that this is ready for .

## 2018-03-27 NOTE — TELEPHONE ENCOUNTER
Patient is calling to check the status of her refill, informed her there is a 48-72 hr turn around on ALL prescription. Patient states she has never had to wait that long and she is currently out of medication.

## 2018-04-04 RX ORDER — SIMVASTATIN 10 MG/1
TABLET, FILM COATED ORAL
Qty: 90 TAB | Refills: 3 | Status: SHIPPED | OUTPATIENT
Start: 2018-04-04 | End: 2019-05-11 | Stop reason: SDUPTHER

## 2018-04-12 DIAGNOSIS — Z76.0 MEDICATION REFILL: ICD-10-CM

## 2018-04-12 DIAGNOSIS — J44.9 CHRONIC OBSTRUCTIVE PULMONARY DISEASE, UNSPECIFIED COPD TYPE (HCC): ICD-10-CM

## 2018-04-13 RX ORDER — ALBUTEROL SULFATE 90 UG/1
AEROSOL, METERED RESPIRATORY (INHALATION)
Qty: 18 INHALER | Refills: 3 | Status: SHIPPED | OUTPATIENT
Start: 2018-04-13 | End: 2018-07-02 | Stop reason: SDUPTHER

## 2018-05-07 DIAGNOSIS — Z76.0 MEDICATION REFILL: ICD-10-CM

## 2018-05-07 DIAGNOSIS — M25.551 BILATERAL HIP PAIN: ICD-10-CM

## 2018-05-07 DIAGNOSIS — M25.552 BILATERAL HIP PAIN: ICD-10-CM

## 2018-05-07 NOTE — TELEPHONE ENCOUNTER
printed and placed in PCP medication refill review folder.      Last UDS date: 12/21/2018  Pain contract signed: 01/02/2018  Next Appt: not scheduled  Last Appt: 02/15/2018

## 2018-05-08 RX ORDER — TRAMADOL HYDROCHLORIDE 50 MG/1
50 TABLET ORAL
Qty: 120 TAB | Refills: 0 | Status: SHIPPED | OUTPATIENT
Start: 2018-05-08 | End: 2018-06-14 | Stop reason: SDUPTHER

## 2018-05-11 DIAGNOSIS — M62.838 MUSCLE SPASM: ICD-10-CM

## 2018-05-11 DIAGNOSIS — Z76.0 MEDICATION REFILL: ICD-10-CM

## 2018-05-11 RX ORDER — BACLOFEN 10 MG/1
TABLET ORAL
Qty: 90 TAB | Refills: 6 | Status: SHIPPED | OUTPATIENT
Start: 2018-05-11 | End: 2019-03-28 | Stop reason: SDUPTHER

## 2018-05-11 RX ORDER — TRAZODONE HYDROCHLORIDE 50 MG/1
100 TABLET ORAL
Qty: 60 TAB | Refills: 3 | Status: SHIPPED | OUTPATIENT
Start: 2018-05-11 | End: 2018-12-27 | Stop reason: SDUPTHER

## 2018-05-11 NOTE — TELEPHONE ENCOUNTER
Requested Prescriptions     Pending Prescriptions Disp Refills    traZODone (DESYREL) 50 mg tablet 60 Tab 3    baclofen (LIORESAL) 10 mg tablet 90 Tab 6     Sig: take 1 tablet by mouth three times a day if needed for muscle spasm

## 2018-06-02 DIAGNOSIS — K59.00 CONSTIPATION, UNSPECIFIED CONSTIPATION TYPE: ICD-10-CM

## 2018-06-03 RX ORDER — DOCUSATE SODIUM 100 MG/1
CAPSULE, LIQUID FILLED ORAL
Qty: 180 CAP | Refills: 3 | Status: SHIPPED | OUTPATIENT
Start: 2018-06-03 | End: 2019-06-18 | Stop reason: SDUPTHER

## 2018-06-15 ENCOUNTER — HOSPITAL ENCOUNTER (OUTPATIENT)
Dept: LAB | Age: 63
Discharge: HOME | End: 2018-06-15
Payer: MEDICARE

## 2018-06-15 DIAGNOSIS — Z76.0 MEDICATION REFILL: ICD-10-CM

## 2018-06-15 PROCEDURE — G0480 DRUG TEST DEF 1-7 CLASSES: HCPCS | Performed by: INTERNAL MEDICINE

## 2018-06-20 LAB — DRUGS UR: NORMAL

## 2018-06-21 ENCOUNTER — TELEPHONE (OUTPATIENT)
Dept: INTERNAL MEDICINE CLINIC | Age: 63
End: 2018-06-21

## 2018-06-21 DIAGNOSIS — G89.29 OTHER CHRONIC PAIN: Primary | ICD-10-CM

## 2018-06-21 NOTE — TELEPHONE ENCOUNTER
Contacted pt. 2 pt identifiers confirmed. Pt states that she did take some Hydrocodone. Pt reports that she had 3 teeth extracted and her brother has Hydrocodone so she took some of his medication for her pain. Verbalized understanding. No further questions or concerns at this time.

## 2018-06-21 NOTE — PROGRESS NOTES
1) Please let pt know that UDS showed positive for hydrocodone and it's metabolites as well as tramadol. 2) Has she been taking hydrocodone? Who has been giving her this? I have not prescribed her this?

## 2018-06-21 NOTE — TELEPHONE ENCOUNTER
----- Message from Robbie Rodrigues MD sent at 6/21/2018  4:41 PM EDT -----  1) Please let pt know that UDS showed positive for hydrocodone and it's metabolites as well as tramadol. 2) Has she been taking hydrocodone? Who has been giving her this? I have not prescribed her this?

## 2018-06-21 NOTE — TELEPHONE ENCOUNTER
She needs to not take any controlled substances from other people as this is in violation of her pain agreement. Will give her another chance and she will need to do a UDS prior to her next refill request for tramadol in a month. If UDS shows any abnormalities, will no longer be prescribing any tramadol or any other controlled substances. Order for UDS in [facility].

## 2018-06-22 NOTE — TELEPHONE ENCOUNTER
Contacted pt. 2 pt identifiers confirmed. Notified not to take anyone else's medication as this is a violation to her pain contract, will give another chance and complete another UDS prior to tramadol refill in a month. Pt advised that if there are any abnormalities, we will no longer be able to prescribe controlled substances to her. Pt verbalized understanding of all information. No further questions or concerns at this time.

## 2018-07-02 DIAGNOSIS — Z76.0 MEDICATION REFILL: ICD-10-CM

## 2018-07-02 DIAGNOSIS — J44.9 CHRONIC OBSTRUCTIVE PULMONARY DISEASE, UNSPECIFIED COPD TYPE (HCC): ICD-10-CM

## 2018-07-02 RX ORDER — ALBUTEROL SULFATE 90 UG/1
AEROSOL, METERED RESPIRATORY (INHALATION)
Qty: 18 INHALER | Refills: 3 | Status: SHIPPED | OUTPATIENT
Start: 2018-07-02 | End: 2019-01-11 | Stop reason: SDUPTHER

## 2018-07-05 DIAGNOSIS — Z76.0 MEDICATION REFILL: ICD-10-CM

## 2018-07-06 RX ORDER — GUAIFENESIN 100 MG/5ML
LIQUID (ML) ORAL
Qty: 90 TAB | Refills: 3 | Status: SHIPPED | OUTPATIENT
Start: 2018-07-06 | End: 2019-05-11 | Stop reason: SDUPTHER

## 2018-07-24 ENCOUNTER — OFFICE VISIT (OUTPATIENT)
Dept: INTERNAL MEDICINE CLINIC | Age: 63
End: 2018-07-24

## 2018-07-24 ENCOUNTER — HOSPITAL ENCOUNTER (OUTPATIENT)
Dept: LAB | Age: 63
Discharge: HOME | End: 2018-07-24
Payer: MEDICARE

## 2018-07-24 VITALS
SYSTOLIC BLOOD PRESSURE: 121 MMHG | TEMPERATURE: 97.7 F | DIASTOLIC BLOOD PRESSURE: 79 MMHG | HEIGHT: 63 IN | WEIGHT: 141 LBS | BODY MASS INDEX: 24.98 KG/M2 | HEART RATE: 68 BPM | OXYGEN SATURATION: 97 % | RESPIRATION RATE: 16 BRPM

## 2018-07-24 DIAGNOSIS — R63.4 WEIGHT LOSS: ICD-10-CM

## 2018-07-24 DIAGNOSIS — K59.00 CONSTIPATION, UNSPECIFIED CONSTIPATION TYPE: ICD-10-CM

## 2018-07-24 DIAGNOSIS — E78.5 DYSLIPIDEMIA: ICD-10-CM

## 2018-07-24 DIAGNOSIS — Z76.0 MEDICATION REFILL: ICD-10-CM

## 2018-07-24 DIAGNOSIS — E78.5 DYSLIPIDEMIA: Primary | ICD-10-CM

## 2018-07-24 DIAGNOSIS — R73.01 ELEVATED FASTING GLUCOSE: ICD-10-CM

## 2018-07-24 DIAGNOSIS — F17.200 SMOKER: ICD-10-CM

## 2018-07-24 DIAGNOSIS — J44.9 CHRONIC OBSTRUCTIVE PULMONARY DISEASE, UNSPECIFIED COPD TYPE (HCC): ICD-10-CM

## 2018-07-24 DIAGNOSIS — R63.0 DECREASE IN APPETITE: ICD-10-CM

## 2018-07-24 DIAGNOSIS — M25.551 BILATERAL HIP PAIN: ICD-10-CM

## 2018-07-24 DIAGNOSIS — B96.89 BACTERIAL CONJUNCTIVITIS OF BOTH EYES: ICD-10-CM

## 2018-07-24 DIAGNOSIS — M25.552 BILATERAL HIP PAIN: ICD-10-CM

## 2018-07-24 DIAGNOSIS — G89.29 OTHER CHRONIC PAIN: ICD-10-CM

## 2018-07-24 DIAGNOSIS — H10.9 BACTERIAL CONJUNCTIVITIS OF BOTH EYES: ICD-10-CM

## 2018-07-24 LAB
ALBUMIN SERPL-MCNC: 3.9 G/DL (ref 3.4–5)
ALBUMIN/GLOB SERPL: 1.2 {RATIO} (ref 0.8–1.7)
ALP SERPL-CCNC: 73 U/L (ref 45–117)
ALT SERPL-CCNC: 18 U/L (ref 13–56)
ANION GAP SERPL CALC-SCNC: 6 MMOL/L (ref 3–18)
APPEARANCE UR: CLEAR
AST SERPL-CCNC: 15 U/L (ref 15–37)
BILIRUB SERPL-MCNC: 0.5 MG/DL (ref 0.2–1)
BILIRUB UR QL: NEGATIVE
BUN SERPL-MCNC: 11 MG/DL (ref 7–18)
BUN/CREAT SERPL: 13 (ref 12–20)
CALCIUM SERPL-MCNC: 8.9 MG/DL (ref 8.5–10.1)
CHLORIDE SERPL-SCNC: 107 MMOL/L (ref 100–108)
CHOLEST SERPL-MCNC: 155 MG/DL
CO2 SERPL-SCNC: 30 MMOL/L (ref 21–32)
COLOR UR: YELLOW
CREAT SERPL-MCNC: 0.82 MG/DL (ref 0.6–1.3)
ERYTHROCYTE [DISTWIDTH] IN BLOOD BY AUTOMATED COUNT: 14.3 % (ref 11.6–14.5)
EST. AVERAGE GLUCOSE BLD GHB EST-MCNC: 123 MG/DL
GLOBULIN SER CALC-MCNC: 3.3 G/DL (ref 2–4)
GLUCOSE SERPL-MCNC: 76 MG/DL (ref 74–99)
GLUCOSE UR STRIP.AUTO-MCNC: NEGATIVE MG/DL
HBA1C MFR BLD: 5.9 % (ref 4.2–5.6)
HCT VFR BLD AUTO: 42.6 % (ref 35–45)
HDLC SERPL-MCNC: 78 MG/DL (ref 40–60)
HDLC SERPL: 2 {RATIO} (ref 0–5)
HGB BLD-MCNC: 14.3 G/DL (ref 12–16)
HGB UR QL STRIP: NEGATIVE
KETONES UR QL STRIP.AUTO: NEGATIVE MG/DL
LDLC SERPL CALC-MCNC: 64.6 MG/DL (ref 0–100)
LEUKOCYTE ESTERASE UR QL STRIP.AUTO: NEGATIVE
LIPID PROFILE,FLP: ABNORMAL
MCH RBC QN AUTO: 29.5 PG (ref 24–34)
MCHC RBC AUTO-ENTMCNC: 33.6 G/DL (ref 31–37)
MCV RBC AUTO: 88 FL (ref 74–97)
NITRITE UR QL STRIP.AUTO: NEGATIVE
PH UR STRIP: 5 [PH] (ref 5–8)
PLATELET # BLD AUTO: 206 K/UL (ref 135–420)
PMV BLD AUTO: 10.1 FL (ref 9.2–11.8)
POTASSIUM SERPL-SCNC: 4.1 MMOL/L (ref 3.5–5.5)
PROT SERPL-MCNC: 7.2 G/DL (ref 6.4–8.2)
PROT UR STRIP-MCNC: NEGATIVE MG/DL
RBC # BLD AUTO: 4.84 M/UL (ref 4.2–5.3)
SODIUM SERPL-SCNC: 143 MMOL/L (ref 136–145)
SP GR UR REFRACTOMETRY: 1.02 (ref 1–1.03)
TRIGL SERPL-MCNC: 62 MG/DL (ref ?–150)
TSH SERPL DL<=0.05 MIU/L-ACNC: 0.78 UIU/ML (ref 0.36–3.74)
UROBILINOGEN UR QL STRIP.AUTO: 1 EU/DL (ref 0.2–1)
VLDLC SERPL CALC-MCNC: 12.4 MG/DL
WBC # BLD AUTO: 7.7 K/UL (ref 4.6–13.2)

## 2018-07-24 PROCEDURE — 85027 COMPLETE CBC AUTOMATED: CPT | Performed by: INTERNAL MEDICINE

## 2018-07-24 PROCEDURE — G0480 DRUG TEST DEF 1-7 CLASSES: HCPCS | Performed by: INTERNAL MEDICINE

## 2018-07-24 PROCEDURE — 83036 HEMOGLOBIN GLYCOSYLATED A1C: CPT | Performed by: INTERNAL MEDICINE

## 2018-07-24 PROCEDURE — 80061 LIPID PANEL: CPT | Performed by: INTERNAL MEDICINE

## 2018-07-24 PROCEDURE — 80053 COMPREHEN METABOLIC PANEL: CPT | Performed by: INTERNAL MEDICINE

## 2018-07-24 PROCEDURE — 84443 ASSAY THYROID STIM HORMONE: CPT | Performed by: INTERNAL MEDICINE

## 2018-07-24 PROCEDURE — 36415 COLL VENOUS BLD VENIPUNCTURE: CPT | Performed by: INTERNAL MEDICINE

## 2018-07-24 PROCEDURE — 81003 URINALYSIS AUTO W/O SCOPE: CPT | Performed by: INTERNAL MEDICINE

## 2018-07-24 RX ORDER — TRAMADOL HYDROCHLORIDE 50 MG/1
50 TABLET ORAL
Qty: 120 TAB | Refills: 0 | Status: SHIPPED | OUTPATIENT
Start: 2018-07-24 | End: 2018-08-30 | Stop reason: SDUPTHER

## 2018-07-24 RX ORDER — POLYETHYLENE GLYCOL 3350 17 G/17G
17 POWDER, FOR SOLUTION ORAL
Qty: 30 EACH | Refills: 3 | Status: SHIPPED | OUTPATIENT
Start: 2018-07-24 | End: 2022-05-12

## 2018-07-24 RX ORDER — OFLOXACIN 3 MG/ML
SOLUTION/ DROPS OPHTHALMIC
Qty: 5 ML | Refills: 0 | Status: SHIPPED | OUTPATIENT
Start: 2018-07-24 | End: 2018-12-18

## 2018-07-24 NOTE — PROGRESS NOTES
Identified pt with two pt identifiers(name and ). Reviewed record in preparation for visit and have obtained necessary documentation. Tatiana Cooney presents today for   Chief Complaint   Patient presents with    COPD     follow up    Medication Refill     Requested Prescriptions     Pending Prescriptions Disp Refills    traMADol (ULTRAM) 50 mg tablet 120 Tab 0     Sig: Take 1 Tab by mouth every six (6) hours as needed for Pain. Tatiana Cooney preferred language for health care discussion is english/other. Is someone accompanying this pt? No    Is the patient using any DME equipment during OV? No    Depression Screening:  PHQ over the last two weeks 2018 2/15/2018 10/23/2017 2017 2016 2016 2015   Little interest or pleasure in doing things Not at all Not at all Not at all Not at all Not at all Not at all Nearly every day   Feeling down, depressed, irritable, or hopeless Not at all Not at all Not at all Not at all Not at all Not at all Nearly every day   Total Score PHQ 2 0 0 0 0 0 0 6       Learning Assessment:  Learning Assessment 5/3/2016 2015 2014 2014 2014 2014   PRIMARY LEARNER Patient Patient Patient Patient Patient Patient   HIGHEST LEVEL OF EDUCATION - PRIMARY LEARNER  DID NOT GRADUATE HIGH SCHOOL DID NOT GRADUATE HIGH SCHOOL - - DID NOT GRADUATE HIGH SCHOOL DID NOT GRADUATE [facility] LEARNER NONE NONE - - - [address] CAREGIVER - - - - - No   PRIMARY [address] [facility]   LEARNER PREFERENCE PRIMARY DEMONSTRATION DEMONSTRATION DEMONSTRATION DEMONSTRATION OTHER (COMMENT) READING   ANSWERED BY patient  Patient patient patient patient Patient   RELATIONSHIP SELF SELF SELF SELF SELF SELF       Abuse Screening:  Abuse Screening Questionnaire 2/15/2018 10/23/2017 2015   Do you ever feel afraid of your partner?  N N N   Are you in a relationship with someone who physically or mentally threatens you? N N N   Is it safe for you to go home? Y Y Y       Fall Risk  Fall Risk Assessment, last 12 mths 7/24/2018   Able to walk? Yes   Fall in past 12 months? No       Health Maintenance reviewed and discussed per provider. Yes    Please order/place referral if appropriate. Advance Directive:  1. Do you have an advance directive in place? Patient Reply: No    2. If not, would you like material regarding how to put one in place? Patient Reply: No    Coordination of Care:  1. Have you been to the ER, urgent care clinic since your last visit? Hospitalized since your last visit? No    2. Have you seen or consulted any other health care providers outside of the [facility] since your last visit? Include any pap smears or colon screening.  No

## 2018-07-24 NOTE — MR AVS SNAPSHOT
[address] [facility] [address] 
[phone] Patient: Chavez Murcia MRN: [id number] NOD:[id number] Visit Information Date & Time Provider Department Dept. Phone Encounter #  
 7/24/2018 12:45 PM Leonid Phillips MD [address] [phone] [id number] Upcoming Health Maintenance Date Due DTaP/Tdap/Td series (1 - Tdap) 8/19/1978 FOBT Q 1 YEAR AGE 50-75 9/23/2015 PAP AKA CERVICAL CYTOLOGY 6/21/2016 ZOSTER VACCINE AGE 60> 6/19/2017 MEDICARE YEARLY EXAM 5/12/2018 Influenza Age 5 to Adult 8/1/2018 BREAST CANCER SCRN MAMMOGRAM 8/10/2019 Allergies as of 7/24/2018  Review Complete On: 7/24/2018 By: Jassi Becker MD  
  
 Severity Noted Reaction Type Reactions Ibuprofen Medium 05/01/2013   Side Effect Itching, Nausea Only, Swelling  
 swelling Neurontin [Gabapentin] Medium 08/18/2014   Side Effect Anxiety Current Immunizations  Reviewed on 8/30/2017 Name Date Influenza Vaccine 11/6/2014  1:50 PM, 10/17/2013, 10/13/2011 12:00 AM, 1/20/2011 Influenza Vaccine (Quad) PF 11/4/2015 Pneumococcal Polysaccharide (PPSV-23) 10/17/2013, 10/13/2011 Not reviewed this visit You Were Diagnosed With   
  
 Codes Comments Dyslipidemia    -  Primary ICD-10-CM: E78.5 ICD-9-CM: 272.4 Medication refill     ICD-10-CM: Z76.0 ICD-9-CM: V68.1 Bilateral hip pain     ICD-10-CM: M25.551, M25.552 ICD-9-CM: 719.45 Decrease in appetite     ICD-10-CM: R63.0 ICD-9-CM: 783.0 Elevated fasting glucose     ICD-10-CM: R73.01 
ICD-9-CM: 790.21 Weight loss     ICD-10-CM: R63.4 ICD-9-CM: 783.21 Smoker     ICD-10-CM: T20.156 ICD-9-CM: 305.1 Constipation, unspecified constipation type     ICD-10-CM: K59.00 ICD-9-CM: 564.00 Bacterial conjunctivitis of both eyes     ICD-10-CM: H10.9 ICD-9-CM: 372.30 Vitals BP Pulse Temp Resp Height(growth percentile) Weight(growth percentile) 121/79 (BP 1 Location: Left arm, BP Patient Position: Sitting) 68 97.7 °F (36.5 °C) (Oral) 16 5' 3\" (1.6 m) 141 lb (64 kg) SpO2 BMI OB Status Smoking Status 97% 24.98 kg/m2 Postmenopausal Current Every Day Smoker Vitals History BMI and BSA Data Body Mass Index Body Surface Area 24.98 kg/m 2 1.69 m 2 Preferred Pharmacy Pharmacy Name Phone [phone] Your Updated Medication List  
  
   
This list is accurate as of 7/24/18  1:03 PM.  Always use your most recent med list.  
  
  
  
  
 * albuterol 2.5 mg /3 mL (0.083 %) nebulizer solution Commonly known as:  PROVENTIL VENTOLIN  
USe 1 vial every 4-6 hours prn shortness of breath * albuterol 90 mcg/actuation inhaler Commonly known as:  VENTOLIN HFA  
inhale 1 to 2 puffs every 4 to 6 hours if needed for shortness of breath  
  
 aspirin 81 mg chewable tablet  
chew and swallow 1 tablet by mouth once daily  
  
 baclofen 10 mg tablet Commonly known as:  LIORESAL  
take 1 tablet by mouth three times a day if needed for muscle spasm Blood-Glucose Meter monitoring kit Commonly known as:  FREESTYLE LITE METER Check fasting sugars before breakfast. Dx Code 790.21  
  
 cholecalciferol (vitamin D3) 2,000 unit Tab Commonly known as:  VITAMIN D3 Take 1 Tab by mouth daily. COL-RITE 100 mg capsule Generic drug:  docusate sodium  
take 2 capsules by mouth every evening if needed for constipation  
  
 glucose blood VI test strips strip Commonly known as:  FREESTYLE LITE STRIPS Check fasting sugars before breakfast. Dx Code 790.21 Humidifiers Misc Commonly known as:  [address] Use as directed. Please include the vicks solution Lancets Misc Check fasting sugars before breakfast. Dx Code 790.21. For freestyle lite meter. montelukast 10 mg tablet Commonly known as:  SINGULAIR Nebulizer & Compressor machine Use as directed for COPD and asthma. COPD: J44.9  Asthma: J45.909 * Nebulizer Accessories Kit Use as directed. Pt needs refill of supplies for nebulizer machine. She needs new tubing and full face mask for nebulizer machine. * Nebulizer Accessories Kit Use as directed for COPD and asthma. COPD: J44.9  Asthma: J45.909  
  
 ofloxacin 0.3 % ophthalmic solution Commonly known as:  FLOXIN Use 2 gtts in both eyes every 4 hrs for 2 days then 2 gtt in both eyes qid x 5 days  
  
 polyethylene glycol 17 gram packet Commonly known as:  Marion Kassandra Take 1 Packet by mouth daily as needed. For constipation  
  
 simvastatin 10 mg tablet Commonly known as:  ZOCOR  
take 1 tablet by mouth every evening SYMBICORT 160-4.5 mcg/actuation Hfaa Generic drug:  budesonide-formoterol  
  
 tiotropium 18 mcg inhalation capsule Commonly known as:  [address]  
inhale the contents of one capsule in the handihaler once daily  
  
 traMADol 50 mg tablet Commonly known as:  ULTRAM  
Take 1 Tab by mouth every six (6) hours as needed for Pain. traZODone 50 mg tablet Commonly known as:  [place] Esme Take 2 Tabs by mouth nightly. * Notice: This list has 4 medication(s) that are the same as other medications prescribed for you. Read the directions carefully, and ask your doctor or other care provider to review them with you. Prescriptions Printed Refills  
 traMADol (ULTRAM) 50 mg tablet 0 Sig: Take 1 Tab by mouth every six (6) hours as needed for Pain. Class: Print Route: Oral  
  
Prescriptions Sent to Pharmacy Refills  
 polyethylene glycol (MIRALAX) 17 gram packet 3 Sig: Take 1 Packet by mouth daily as needed. For constipation Class: Normal  
 [address] #: [phone]  Route: Oral  
 ofloxacin (FLOXIN) 0.3 % ophthalmic solution 0 Sig: Use 2 gtts in both eyes every 4 hrs for 2 days then 2 gtt in both eyes qid x 5 days Class: Normal  
 Pharmacy: [address] [facility] [address] [place] [address] #: [phone] To-Do List   
 07/24/2018 Lab:  CBC W/O DIFF   
  
 07/24/2018 Imaging:  CT CHEST ABD PELV W WO CONT   
  
 07/24/2018 Lab:  HEMOGLOBIN A1C WITH EAG   
  
 07/24/2018 Lab:  LIPID PANEL   
  
 07/24/2018 Lab:  METABOLIC PANEL, COMPREHENSIVE   
  
 07/24/2018 Lab:  TSH 3RD GENERATION   
  
 07/24/2018 Lab:  URINALYSIS W/ RFLX MICROSCOPIC Patient Instructions 1) follow-up in 3 months or sooner if worsening symptoms. 2) Need to eat 3) eat more fiber and drink more water Pinkeye: Care Instructions Your Care Instructions Pinkeye is redness and swelling of the eye surface and the conjunctiva (the lining of the eyelid and the covering of the white part of the eye). Pinkeye is also called conjunctivitis. Pinkeye is often caused by infection with bacteria or a virus. Dry air, allergies, smoke, and chemicals are other common causes. Pinkeye often clears on its own in 7 to 10 days. Antibiotics only help if the pinkeye is caused by bacteria. Pinkeye caused by infection spreads easily. If an allergy or chemical is causing pinkeye, it will not go away unless you can avoid whatever is causing it. Follow-up care is a key part of your treatment and safety. Be sure to make and go to all appointments, and call your doctor if you are having problems. It's also a good idea to know your test results and keep a list of the medicines you take. How can you care for yourself at home? · Wash your hands often. Always wash them before and after you treat pinkeye or touch your eyes or face.  
· Use moist cotton or a clean, wet cloth to remove crust. Wipe from the inside corner of the eye to the outside. Use a clean part of the cloth for each wipe. · Put cold or warm wet cloths on your eye a few times a day if the eye hurts. · Do not wear contact lenses or eye makeup until the pinkeye is gone. Throw away any eye makeup you were using when you got pinkeye. Clean your contacts and storage case. If you wear disposable contacts, use a new pair when your eye has cleared and it is safe to wear contacts again. · If the doctor gave you antibiotic ointment or eyedrops, use them as directed. Use the medicine for as long as instructed, even if your eye starts looking better soon. Keep the bottle tip clean, and do not let it touch the eye area. · To put in eyedrops or ointment: ¨ Tilt your head back, and pull your lower eyelid down with one finger. ¨ Drop or squirt the medicine inside the lower lid. ¨ Close your eye for 30 to 60 seconds to let the drops or ointment move around. ¨ Do not touch the ointment or dropper tip to your eyelashes or any other surface. · Do not share towels, pillows, or washcloths while you have pinkeye. When should you call for help? Call your doctor now or seek immediate medical care if: 
  · You have pain in your eye, not just irritation on the surface.  
  · You have a change in vision or loss of vision.  
  · You have an increase in discharge from the eye.  
  · Your eye has not started to improve or begins to get worse within 48 hours after you start using antibiotics.  
  · Pinkeye lasts longer than 7 days.  
 Watch closely for changes in your health, and be sure to contact your doctor if you have any problems. Where can you learn more? Go to http://stevo-tram.info/. Enter Y392 in the search box to learn more about \"Pinkeye: Care Instructions. \" Current as of: November 20, 2017 Content Version: 11.7 © 5666-2057 [employer].  Care instructions adapted under license by [address] (which disclaims liability or warranty for this information). If you have questions about a medical condition or this instruction, always ask your healthcare professional. [address] any warranty or liability for your use of this information. Constipation: Care Instructions Your Care Instructions Constipation means that you have a hard time passing stools (bowel movements). People pass stools from 3 times a day to once every 3 days. What is normal for you may be different. Constipation may occur with pain in the rectum and cramping. The pain may get worse when you try to pass stools. Sometimes there are small amounts of bright red blood on toilet paper or the surface of stools. This is because of enlarged veins near the rectum (hemorrhoids). A few changes in your diet and lifestyle may help you avoid ongoing constipation. Your doctor may also prescribe medicine to help loosen your stool. Some medicines can cause constipation. These include pain medicines and antidepressants. Tell your doctor about all the medicines you take. Your doctor may want to make a medicine change to ease your symptoms. Follow-up care is a key part of your treatment and safety. Be sure to make and go to all appointments, and call your doctor if you are having problems. It's also a good idea to know your test results and keep a list of the medicines you take. How can you care for yourself at home? · Drink plenty of fluids, enough so that your urine is light yellow or clear like water. If you have kidney, heart, or liver disease and have to limit fluids, talk with your doctor before you increase the amount of fluids you drink. · Include high-fiber foods in your diet each day. These include fruits, vegetables, beans, and whole grains. · Get at least 30 minutes of exercise on most days of the week.  Walking is a good choice. You also may want to do other activities, such as running, swimming, cycling, or playing tennis or team sports. · Take a fiber supplement, such as Citrucel or Metamucil, every day. Read and follow all instructions on the label. · Schedule time each day for a bowel movement. A daily routine may help. Take your time having your bowel movement. · Support your feet with a small step stool when you sit on the toilet. This helps flex your hips and places your pelvis in a squatting position. · Your doctor may recommend an over-the-counter laxative to relieve your constipation. Examples are Milk of Magnesia and MiraLax. Read and follow all instructions on the label. Do not use laxatives on a long-term basis. When should you call for help? Call your doctor now or seek immediate medical care if: 
  · You have new or worse belly pain.  
  · You have new or worse nausea or vomiting.  
  · You have blood in your stools.  
 Watch closely for changes in your health, and be sure to contact your doctor if: 
  · Your constipation is getting worse.  
  · You do not get better as expected. Where can you learn more? Go to http://stevo-tram.info/. Enter 21  in the search box to learn more about \"Constipation: Care Instructions. \" Current as of: November 20, 2017 Content Version: 11.7 © 8522-1643 [employer]. Care instructions adapted under license by [employer] (which disclaims liability or warranty for this information). If you have questions about a medical condition or this instruction, always ask your healthcare professional. [place] [address] any warranty or liability for your use of this information. Introducing [facility]! [facility] introduces Microtask patient portal. Now you can access parts of your medical record, email your doctor's office, and request medication refills online.    
 
1. In your internet browser, go to [email or web address] 2. Click on the First Time User? Click Here link in the Sign In box. You will see the New Member Sign Up page. 3. Enter your Telx Access Code exactly as it appears below. You will not need to use this code after youve completed the sign-up process. If you do not sign up before the expiration date, you must request a new code. · Telx Access Code: [email or web address] Expires: 10/22/2018  1:03 PM 
 
4. Enter the last four digits of your Social Security Number (xxxx) and Date of Birth (mm/dd/yyyy) as indicated and click Submit. You will be taken to the next sign-up page. 5. Create a Phonezoo Communicationst ID. This will be your Telx login ID and cannot be changed, so think of one that is secure and easy to remember. 6. Create a Telx password. You can change your password at any time. 7. Enter your Password Reset Question and Answer. This can be used at a later time if you forget your password. 8. Enter your e-mail address. You will receive e-mail notification when new information is available in [address] 9. Click Sign Up. You can now view and download portions of your medical record. 10. Click the Download Summary menu link to download a portable copy of your medical information. If you have questions, please visit the Frequently Asked Questions section of the Telx website. Remember, Telx is NOT to be used for urgent needs. For medical emergencies, dial 911. Now available from your iPhone and Android! Please provide this summary of care documentation to your next provider. Your primary care clinician is listed as Leonid Lee. If you have any questions after today's visit, please call [phone].
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
## 2018-07-24 NOTE — PATIENT INSTRUCTIONS
1) follow-up in 3 months or sooner if worsening symptoms. 2) Need to eat 3) eat more fiber and drink more water Pinkeye: Care Instructions Your Care Instructions Pinkeye is redness and swelling of the eye surface and the conjunctiva (the lining of the eyelid and the covering of the white part of the eye). Pinkeye is also called conjunctivitis. Pinkeye is often caused by infection with bacteria or a virus. Dry air, allergies, smoke, and chemicals are other common causes. Pinkeye often clears on its own in 7 to 10 days. Antibiotics only help if the pinkeye is caused by bacteria. Pinkeye caused by infection spreads easily. If an allergy or chemical is causing pinkeye, it will not go away unless you can avoid whatever is causing it. Follow-up care is a key part of your treatment and safety. Be sure to make and go to all appointments, and call your doctor if you are having problems. It's also a good idea to know your test results and keep a list of the medicines you take. How can you care for yourself at home? · Wash your hands often. Always wash them before and after you treat pinkeye or touch your eyes or face. · Use moist cotton or a clean, wet cloth to remove crust. Wipe from the inside corner of the eye to the outside. Use a clean part of the cloth for each wipe. · Put cold or warm wet cloths on your eye a few times a day if the eye hurts. · Do not wear contact lenses or eye makeup until the pinkeye is gone. Throw away any eye makeup you were using when you got pinkeye. Clean your contacts and storage case. If you wear disposable contacts, use a new pair when your eye has cleared and it is safe to wear contacts again. · If the doctor gave you antibiotic ointment or eyedrops, use them as directed. Use the medicine for as long as instructed, even if your eye starts looking better soon. Keep the bottle tip clean, and do not let it touch the eye area.  
· To put in eyedrops or ointment: ¨ Tilt your head back, and pull your lower eyelid down with one finger. ¨ Drop or squirt the medicine inside the lower lid. ¨ Close your eye for 30 to 60 seconds to let the drops or ointment move around. ¨ Do not touch the ointment or dropper tip to your eyelashes or any other surface. · Do not share towels, pillows, or washcloths while you have pinkeye. When should you call for help? Call your doctor now or seek immediate medical care if: 
  · You have pain in your eye, not just irritation on the surface.  
  · You have a change in vision or loss of vision.  
  · You have an increase in discharge from the eye.  
  · Your eye has not started to improve or begins to get worse within 48 hours after you start using antibiotics.  
  · Pinkeye lasts longer than 7 days.  
 Watch closely for changes in your health, and be sure to contact your doctor if you have any problems. Where can you learn more? Go to http://stevo-tram.info/. Enter Y392 in the search box to learn more about \"Pinkeye: Care Instructions. \" Current as of: November 20, 2017 Content Version: 11.7 © 3347-3038 [employer]. Care instructions adapted under license by [employer] (which disclaims liability or warranty for this information). If you have questions about a medical condition or this instruction, always ask your healthcare professional. [place] [address] any warranty or liability for your use of this information. Constipation: Care Instructions Your Care Instructions Constipation means that you have a hard time passing stools (bowel movements). People pass stools from 3 times a day to once every 3 days. What is normal for you may be different. Constipation may occur with pain in the rectum and cramping. The pain may get worse when you try to pass stools. Sometimes there are small amounts of bright red blood on toilet paper or the surface of stools.  This is because of enlarged veins near the rectum (hemorrhoids). A few changes in your diet and lifestyle may help you avoid ongoing constipation. Your doctor may also prescribe medicine to help loosen your stool. Some medicines can cause constipation. These include pain medicines and antidepressants. Tell your doctor about all the medicines you take. Your doctor may want to make a medicine change to ease your symptoms. Follow-up care is a key part of your treatment and safety. Be sure to make and go to all appointments, and call your doctor if you are having problems. It's also a good idea to know your test results and keep a list of the medicines you take. How can you care for yourself at home? · Drink plenty of fluids, enough so that your urine is light yellow or clear like water. If you have kidney, heart, or liver disease and have to limit fluids, talk with your doctor before you increase the amount of fluids you drink. · Include high-fiber foods in your diet each day. These include fruits, vegetables, beans, and whole grains. · Get at least 30 minutes of exercise on most days of the week. Walking is a good choice. You also may want to do other activities, such as running, swimming, cycling, or playing tennis or team sports. · Take a fiber supplement, such as Citrucel or Metamucil, every day. Read and follow all instructions on the label. · Schedule time each day for a bowel movement. A daily routine may help. Take your time having your bowel movement. · Support your feet with a small step stool when you sit on the toilet. This helps flex your hips and places your pelvis in a squatting position. · Your doctor may recommend an over-the-counter laxative to relieve your constipation. Examples are Milk of Magnesia and MiraLax. Read and follow all instructions on the label. Do not use laxatives on a long-term basis. When should you call for help?  
Call your doctor now or seek immediate medical care if: 
  · You have new or worse belly pain.  
  · You have new or worse nausea or vomiting.  
  · You have blood in your stools.  
 Watch closely for changes in your health, and be sure to contact your doctor if: 
  · Your constipation is getting worse.  
  · You do not get better as expected. Where can you learn more? Go to http://stevo-tram.info/. Enter 21 [id number] in the search box to learn more about \"Constipation: Care Instructions. \" Current as of: November 20, 2017 Content Version: 11.7 © 7189-0151 [employer]. Care instructions adapted under license by [employer] (which disclaims liability or warranty for this information). If you have questions about a medical condition or this instruction, always ask your healthcare professional. [address] any warranty or liability for your use of this information.

## 2018-07-31 LAB — DRUGS UR: NORMAL

## 2018-10-08 RX ORDER — MONTELUKAST SODIUM 10 MG/1
TABLET ORAL
Qty: 90 TAB | Refills: 3 | Status: SHIPPED | OUTPATIENT
Start: 2018-10-08 | End: 2019-08-09 | Stop reason: SDUPTHER

## 2018-10-09 DIAGNOSIS — M25.552 BILATERAL HIP PAIN: ICD-10-CM

## 2018-10-09 DIAGNOSIS — M25.551 BILATERAL HIP PAIN: ICD-10-CM

## 2018-10-09 DIAGNOSIS — Z76.0 MEDICATION REFILL: ICD-10-CM

## 2018-10-10 RX ORDER — TRAMADOL HYDROCHLORIDE 50 MG/1
50 TABLET ORAL
Qty: 120 TAB | Refills: 0 | Status: SHIPPED | OUTPATIENT
Start: 2018-10-10 | End: 2018-11-12 | Stop reason: SDUPTHER

## 2018-10-10 NOTE — TELEPHONE ENCOUNTER
PCP: Shona Goldstein MD    Last appt: 7/24/2018  No future appointments. Requested Prescriptions     Pending Prescriptions Disp Refills    traMADol (ULTRAM) 50 mg tablet 120 Tab 0     Sig: Take 1 Tab by mouth every six (6) hours as needed for Pain. Request for a 30 or 90 day supply? Provider Discretion    Pharmacy: [address] [place] [address]    Other Comments:   printed and placed in PCP medication refill review folder.      Last UDS date: 07/24/2018  Pain contract signed: 01/02/2018

## 2018-10-10 NOTE — TELEPHONE ENCOUNTER
Checked  and no aberrancies seen. Printed rx for:    Requested Prescriptions     Signed Prescriptions Disp Refills    traMADol (ULTRAM) 50 mg tablet 120 Tab 0     Sig: Take 1 Tab by mouth every six (6) hours as needed for Pain.      Authorizing Provider: Shad Drew

## 2018-11-01 RX ORDER — MAGNESIUM CITRATE
SOLUTION, ORAL ORAL
Qty: 1 BOTTLE | Refills: 1 | Status: SHIPPED | OUTPATIENT
Start: 2018-11-01

## 2018-11-01 NOTE — TELEPHONE ENCOUNTER
Sent electronically:    Requested Prescriptions     Signed Prescriptions Disp Refills    magnesium citrate solution 1 Bottle 1     Sig: Take 150ml po bid prn constipation     Authorizing Provider: Rosa Chisholm     If she does not have a bowel movement in 1-2 days, she needs to let us know ASAP.

## 2018-11-12 DIAGNOSIS — Z76.0 MEDICATION REFILL: ICD-10-CM

## 2018-11-12 DIAGNOSIS — M25.552 BILATERAL HIP PAIN: ICD-10-CM

## 2018-11-12 DIAGNOSIS — M25.551 BILATERAL HIP PAIN: ICD-10-CM

## 2018-11-12 NOTE — TELEPHONE ENCOUNTER
Patient request, last OV 7/24/18, no future appt scheduled. Requested Prescriptions     Pending Prescriptions Disp Refills    traMADol (ULTRAM) 50 mg tablet 120 Tab 0     Sig: Take 1 Tab by mouth every six (6) hours as needed for Pain.

## 2018-11-15 RX ORDER — TRAMADOL HYDROCHLORIDE 50 MG/1
50 TABLET ORAL
Qty: 120 TAB | Refills: 0 | Status: SHIPPED | OUTPATIENT
Start: 2018-11-15 | End: 2018-12-18 | Stop reason: SDUPTHER

## 2018-11-15 NOTE — TELEPHONE ENCOUNTER
PCP: Corine Hinojosa MD    Last appt: 7/24/2018  No future appointments. Requested Prescriptions     Pending Prescriptions Disp Refills    traMADol (ULTRAM) 50 mg tablet 120 Tab 0     Sig: Take 1 Tab by mouth every six (6) hours as needed for Pain. Request for a 30 or 90 day supply? Provider Discretion    Pharmacy: [address] [place] [address] [facility]    Other Comments:   printed and placed in PCP medication refill review folder.      Last UDS date: 07/24/2018  Pain contract signed: 01/02/2018

## 2018-11-15 NOTE — TELEPHONE ENCOUNTER
Printed rx for:    Requested Prescriptions     Signed Prescriptions Disp Refills    traMADol (ULTRAM) 50 mg tablet 120 Tab 0     Sig: Take 1 Tab by mouth every six (6) hours as needed for Pain. Authorizing Provider: Deloris Guajardo     Please let pt know that this is ready for .

## 2018-12-18 ENCOUNTER — OFFICE VISIT (OUTPATIENT)
Dept: INTERNAL MEDICINE CLINIC | Age: 63
End: 2018-12-18

## 2018-12-18 ENCOUNTER — HOSPITAL ENCOUNTER (OUTPATIENT)
Dept: LAB | Age: 63
Discharge: HOME | End: 2018-12-18
Payer: MEDICARE

## 2018-12-18 VITALS
BODY MASS INDEX: 25.62 KG/M2 | WEIGHT: 144.6 LBS | SYSTOLIC BLOOD PRESSURE: 116 MMHG | OXYGEN SATURATION: 95 % | HEART RATE: 74 BPM | RESPIRATION RATE: 18 BRPM | TEMPERATURE: 97.5 F | HEIGHT: 63 IN | DIASTOLIC BLOOD PRESSURE: 78 MMHG

## 2018-12-18 DIAGNOSIS — E78.5 DYSLIPIDEMIA: Primary | ICD-10-CM

## 2018-12-18 DIAGNOSIS — G89.29 ENCOUNTER FOR CHRONIC PAIN MANAGEMENT: ICD-10-CM

## 2018-12-18 DIAGNOSIS — M54.50 CHRONIC BILATERAL LOW BACK PAIN WITHOUT SCIATICA: ICD-10-CM

## 2018-12-18 DIAGNOSIS — G89.29 CHRONIC BILATERAL LOW BACK PAIN WITHOUT SCIATICA: ICD-10-CM

## 2018-12-18 DIAGNOSIS — E78.5 DYSLIPIDEMIA: ICD-10-CM

## 2018-12-18 DIAGNOSIS — R73.01 ELEVATED FASTING GLUCOSE: ICD-10-CM

## 2018-12-18 DIAGNOSIS — Z76.0 MEDICATION REFILL: ICD-10-CM

## 2018-12-18 DIAGNOSIS — M25.552 BILATERAL HIP PAIN: ICD-10-CM

## 2018-12-18 DIAGNOSIS — J44.9 CHRONIC OBSTRUCTIVE PULMONARY DISEASE, UNSPECIFIED COPD TYPE (HCC): ICD-10-CM

## 2018-12-18 DIAGNOSIS — M25.551 BILATERAL HIP PAIN: ICD-10-CM

## 2018-12-18 LAB
ALBUMIN SERPL-MCNC: 3.8 G/DL (ref 3.4–5)
ALBUMIN/GLOB SERPL: 1.3 {RATIO} (ref 0.8–1.7)
ALP SERPL-CCNC: 70 U/L (ref 45–117)
ALT SERPL-CCNC: 23 U/L (ref 13–56)
ANION GAP SERPL CALC-SCNC: 8 MMOL/L (ref 3–18)
AST SERPL-CCNC: 19 U/L (ref 15–37)
BILIRUB SERPL-MCNC: 0.4 MG/DL (ref 0.2–1)
BUN SERPL-MCNC: 12 MG/DL (ref 7–18)
BUN/CREAT SERPL: 15 (ref 12–20)
CALCIUM SERPL-MCNC: 8.8 MG/DL (ref 8.5–10.1)
CHLORIDE SERPL-SCNC: 106 MMOL/L (ref 100–108)
CHOLEST SERPL-MCNC: 179 MG/DL
CO2 SERPL-SCNC: 28 MMOL/L (ref 21–32)
CREAT SERPL-MCNC: 0.82 MG/DL (ref 0.6–1.3)
EST. AVERAGE GLUCOSE BLD GHB EST-MCNC: 123 MG/DL
GLOBULIN SER CALC-MCNC: 3 G/DL (ref 2–4)
GLUCOSE SERPL-MCNC: 85 MG/DL (ref 74–99)
HBA1C MFR BLD: 5.9 % (ref 4.2–5.6)
HDLC SERPL-MCNC: 83 MG/DL (ref 40–60)
HDLC SERPL: 2.2 {RATIO} (ref 0–5)
LDLC SERPL CALC-MCNC: 78.8 MG/DL (ref 0–100)
LIPID PROFILE,FLP: ABNORMAL
POTASSIUM SERPL-SCNC: 4.2 MMOL/L (ref 3.5–5.5)
PROT SERPL-MCNC: 6.8 G/DL (ref 6.4–8.2)
SODIUM SERPL-SCNC: 142 MMOL/L (ref 136–145)
TRIGL SERPL-MCNC: 86 MG/DL (ref ?–150)
VLDLC SERPL CALC-MCNC: 17.2 MG/DL

## 2018-12-18 PROCEDURE — 36415 COLL VENOUS BLD VENIPUNCTURE: CPT

## 2018-12-18 PROCEDURE — 80053 COMPREHEN METABOLIC PANEL: CPT

## 2018-12-18 PROCEDURE — 80061 LIPID PANEL: CPT

## 2018-12-18 PROCEDURE — G0480 DRUG TEST DEF 1-7 CLASSES: HCPCS

## 2018-12-18 PROCEDURE — 83036 HEMOGLOBIN GLYCOSYLATED A1C: CPT

## 2018-12-18 RX ORDER — TRAMADOL HYDROCHLORIDE 50 MG/1
50 TABLET ORAL
Qty: 120 TAB | Refills: 0 | Status: SHIPPED | OUTPATIENT
Start: 2018-12-18 | End: 2018-12-18

## 2018-12-18 RX ORDER — TRAMADOL HYDROCHLORIDE 50 MG/1
50 TABLET ORAL
Qty: 240 TAB | Refills: 0 | Status: SHIPPED | OUTPATIENT
Start: 2018-12-18 | End: 2019-03-04 | Stop reason: SDUPTHER

## 2018-12-18 NOTE — LETTER
Name:Denia Singh KXJ:[birth year] MR #:[id number] Office:[facility] Page 1 of 5 CONTROLLED SUBSTANCE AGREEMENT I may be prescribed medications that are controlled substances as part  of my treatment plan for management of my medical condition(s). The goal of my treatment plan is to maintain and/or improve my health and wellbeing. Because controlled substances have an increased risk of abuse or harm, continual re-evaluation is needed determine if the goals of my treatment plan are being met for my safety and the safety of others. I, Walter García  am entering into this Controlled Substance Agreement with my provider, __Dr. Lee________________________________ at [address] . I understand that successful treatment requires mutual trust and honesty between me and my provider. I understand that there are state and federal laws and regulations which apply to the medications that my provider may prescribe that must be followed. I understand there are risks and benefits ts of taking the medicines that my provider may prescribe. I understand and agree that following this Agreement is necessary in continuing my provider-patient relationship and success of my treatment plan. As a part of my treatment plan, I agree to the following: COMMUNICATION: 
 
1. I will communicate fully with my provider about my medical condition(s), including the effect on my daily life and how well my medications are helping. I will tell my provider all of the medications that I take for any reason, including medications I receive from another health care provider, and will notify my provider about all issues, problems or concerns, including any side effects, which may be related to my medications. I understand that this information allows my provider to adjust my treatment plan to help manage my medical condition.  I understand that this information will become part of my permanent medical record. 2. I will notify my provider if I have a history of alcohol/drug misuse/addiction or if I have had treatment for alcohol/drug addiction in the past, or if I have a new problem with or concern about alcohol/drug use/addiction, because this increases the likelihood of high risk behaviors and may lead to serious medical conditions. 3. Females Only: I will notify my provider if I am or become pregnant, or if I intend to become pregnant, or if I intend to breastfeed. I understand that communication of these issues with my provider is important, due to possible effects my medication could have on an unborn fetus or breastfeeding child. Initials_____ Name:.Torrey Urbano XXV:[birth year] MR #:[id number] Office:[facility] Page 2 of 5 MISUSE OF MEDICATIONS / DRUGS: 
 
1. I agree to take all controlled substances as prescribed, and will not misuse or abuse any controlled substances prescribed by my provider. For my safety, I will not increase the amount of medicine I take without first talking with and getting permission from my provider. 2. If I have a medical emergency, another health care provider may prescribe me medication. If I seek emergency treatment, I will notify my provider within seventy-two (72) hours. 3. I understand that my provider may discuss my use and/or possible misuse/abuse of controlled substances and alcohol, as appropriate, with any health care provider involved in my care, pharmacist or legal authority. ILLEGAL DRUGS: 
 
1. I will not use illegal drugs of any kind, including but not limited to marijuana, heroin, cocaine, or any prescription drug which is not prescribed to me. DRUG DIVERSION / PRESCRIPTION FRAUD: 
 
1. I will not share, sell, trade, give away, or otherwise misuse my prescriptions or medications. 2. I will not alter any prescriptions provided to me by my provider. SINGLE PROVIDER: 
 
1. I agree that all controlled substances that I take will be prescribed only by my provider (or his/her covering provider) under this Agreement. This agreement does not prevent me from seeking emergency medical treatment or receiving pain management related to a surgery. PROTECTING MEDICATIONS: 
 
1. I am responsible for keeping my prescriptions and medications in a safe and secure place including safeguarding them from loss or theft. I understand that lost, stolen or damaged/destroyed prescriptions or medications will not be replaced. Initials____ Name:.Torrey Abreu RCQ:[birth year] MR #:[id number] Office:[facility] Page 3 of 5 PRESCRIPTION RENEWALS/REFILLS: 
 
1. I will follow my controlled substance medication schedule as prescribed by my provider. 2. I understand and agree that I will make any requests for renewals or refills of my prescriptions only at the time of an office visit or during my providers regular office hours subject to the prescription refill requirements of the individual practice. 3. I understand that my provider may not call in prescriptions for controlled substances to my pharmacy. 4. I understand that my provider may adjust or discontinue these medications as deemed appropriate for my medical treatment plan. This Agreement does not guarantee the prescription of controlled medications. 5. I agree that if my medications are adjusted or discontinued, I will properly dispose of any remaining medications. I understand that I will be required to dispose of any remaining controlled medications prior to being provided with any prescriptions for other controlled medications.  
 
6. I understand that the renewal of my prescription depends on my medical condition, my consistent participation, and my adherence with my treatment plan and this Agreement. 7. I understand that if I do not keep an appointment with my provider, I may not receive a renewal or refill for my controlled substance medication. PRESCRIPTION MONITORING / DRUG TESTIN. I understand that my provider may require me to provide urine, saliva or blood for testing at any time. I understand that this testing will be used to monitor for safety and adherence with my treatment plan and this Agreement. 2. I understand that my provider may ask me to provide an observed urine specimen, which means that a nurse or other health care provider may watch me provide urine, and I agree to cooperate if I am asked to provide an observed specimen. 3. I understand that if I do not provide urine, saliva or blood samples within two (2) hours of my providers request, or other timeframe decided by my provider, my treatment plan could be changed, or my prescriptions and medications may be changed or ended. 4. I understand that urine, saliva and blood test results will be a part of my permanent medical record. Initials_____ Name:Denia Brower PEU: MR #:[id number] Office:[facility] Page 4 of 5 
 
 
1. I authorize my provider and my pharmacy to cooperate fully with any local, state, or federal law enforcement agency in the investigation of any possible misuse, sale, or other diversion of my controlled substance prescriptions or medications. RISKS: 
 
1. I understand that my level of consciousness may be affected from the use of controlled substances, and I understand that there are risks, benefits, effects and potential alternatives (including no treatment) to the medications that my provider has prescribed. 2. I understand that I may become drowsy, tired, dizzy, constipated, and sick to my stomach, or have changes in my mood or in my sleep while taking my medications. I have talked with my provider about these possible side effects, risks, benefits, and alternative treatments, and my provider has answered all of my questions. 3. I understand that I should not suddenly stop taking my medications without first speaking with my provider. I understand that if I suddenly stop taking my medications, I may experience nausea, vomiting, sweating,anxiety, sleeplessness, itching or other uncomfortable feelings. 4. I will not take my medications with alcohol of any kind, including beer, wine or liquor. I understand that drinking alcohol with my medications increases the chances of side effects, including breathing problems or even death. 5. I understand that if I have a history of alcoholism or other drug addiction I may be at increased risk of addiction to my medications. Signs of addiction might include craving, compulsive use, and continued use despite harm. Since addiction is a disease, I understand my provider may decide to change my medications and refer me to appropriate treatment services. I understand that this information would become part of my permanent medical record. Initials_____ Name:Denia Acosta Jered [facility]:[birth year] MR #:[id number] Office:[facility] Page 5 of 5  
 
 
6. Females only: Children born to women who regularly take controlled substances are likely to have physical problems and suffer withdrawal symptoms at birth. If I am of child-bearing age, I understand that I should use safe and effective birth control while taking any controlled substances to avoid the impact of medications on an unborn fetus or  child. I agree to notify my provider immediately if I should become pregnant so that my treatment plan can be adjusted. 7. Males only: I understand that chronic use of controlled substances has been associated with low testosterone levels in males which may affect my mood, stamina, sexual desire, and general health. I understand that my provider may order the appropriate laboratory test to determine my testosterone level,and I agree to this testing. ADHERENCE: 
 
1. I understand that if I do not adhere to this Agreement in any way, my provider may change my prescriptions, stop prescribing controlled substances or end our provider-patient relationship. 2. If my provider decides to stop prescribing medication, or decides to end our provider-patient relationship,my provider may require that I taper my medications slowly. If necessary, my provider may also provide a prescription for other medications to treat my withdrawal symptoms. UNDERSTANDING THIS AGREEMENT: 
 
I understand that my provider may adjust or stop my prescriptions for controlled substances based on my medical condition and my treatment plan. I understand that this Agreement does not guarantee that I will be prescribed medications or controlled substances. I understand that controlled substances may be just one part 
of my treatment plan.  
 
My initial on each page and my signature below shows that I have read each page of this Agreement, I have had an opportunity to ask questions, and all of my questions have been answered to my satisfaction by my provider. By signing below, I agree to comply with this Agreement, and I understand that if I do not follow the Agreements listed above, my provider may stop 
_________________________________________  Date/Time 12/18/2018 10:37 AM   
             (Patient Signature) 
 
________________________________________    Date/Time 12/18/2018 10:37 AM 
 (Parent or Guardian Signature if <18 yrs) 
 
_________________________________________ Date/Time 12/18/2018 10:37 AM 
 (Provider Signature) Preferred pharmacy:
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
## 2018-12-18 NOTE — PROGRESS NOTES
ROOM # 1    Torrey Cho presents today for   Chief Complaint   Patient presents with    Back Pain     scapular kush    Medication Refill       [id number]co preferred language for health care discussion is english/other. Is someone accompanying this pt? no    Is the patient using any DME equipment during OV? no    Depression Screening:  PHQ over the last two weeks 7/24/2018 2/15/2018 10/23/2017 5/11/2017 6/29/2016 2/25/2016 4/27/2015   Little interest or pleasure in doing things Not at all Not at all Not at all Not at all Not at all Not at all Nearly every day   Feeling down, depressed, irritable, or hopeless Not at all Not at all Not at all Not at all Not at all Not at all Nearly every day   Total Score PHQ 2 0 0 0 0 0 0 6       Learning Assessment:  Learning Assessment 5/3/2016 6/11/2015 11/26/2014 6/19/2014 5/29/2014 4/28/2014   PRIMARY LEARNER Patient Patient Patient Patient Patient Patient   HIGHEST LEVEL OF EDUCATION - PRIMARY LEARNER  DID NOT GRADUATE HIGH SCHOOL DID NOT GRADUATE HIGH SCHOOL - - DID NOT GRADUATE HIGH SCHOOL DID NOT GRADUATE [facility] LEARNER NONE NONE - - - [address] CAREGIVER - - - - - No   PRIMARY [facility]   LEARNER PREFERENCE PRIMARY DEMONSTRATION DEMONSTRATION DEMONSTRATION DEMONSTRATION OTHER (COMMENT) READING   ANSWERED BY patient  Patient patient patient patient Patient   RELATIONSHIP SELF SELF SELF SELF SELF SELF       Abuse Screening:  Abuse Screening Questionnaire 2/15/2018 10/23/2017 6/11/2015   Do you ever feel afraid of your partner? N N N   Are you in a relationship with someone who physically or mentally threatens you? N N N   Is it safe for you to go home? Y Y Y       Fall Risk  Fall Risk Assessment, last 12 mths 7/24/2018   Able to walk? Yes   Fall in past 12 months? No       Health Maintenance reviewed and discussed per provider.  Yes    [phone]a Pacheco is due for Health Maintenance Due   Topic Date Due    DTaP/Tdap/Td series (1 - Tdap) 08/19/1978    Shingrix Vaccine Age 50> (1 of 2) 08/19/2007    FOBT Q 1 YEAR AGE 50-75  09/23/2015    PAP AKA CERVICAL CYTOLOGY  06/21/2016    MEDICARE YEARLY EXAM  05/12/2018   HM to be d/w provider      Please order/place referral if appropriate. Advance Directive:  1. Do you have an advance directive in place? Patient Reply: no    2. If not, would you like material regarding how to put one in place? Patient Reply: no    Coordination of Care:  1. Have you been to the ER, urgent care clinic since your last visit? Hospitalized since your last visit? no    2. Have you seen or consulted any other health care providers outside of the [facility] since your last visit? Include any pap smears or colon screening.  no

## 2018-12-19 DIAGNOSIS — K59.09 CHRONIC CONSTIPATION: Primary | ICD-10-CM

## 2018-12-19 NOTE — PROGRESS NOTES
Pt is having chronic constipation for years and tried OTC treatments and miralax. She wants to try linzess. Sent electronically:    Requested Prescriptions     Signed Prescriptions Disp Refills    linaclotide (LINZESS) 145 mcg cap capsule 30 Cap 3     Sig: Take 1 Cap by mouth Daily (before breakfast). She mentioned this on the way out of her OV on 12/19/18.

## 2018-12-23 LAB — DRUGS UR: NORMAL

## 2018-12-27 DIAGNOSIS — Z76.0 MEDICATION REFILL: ICD-10-CM

## 2018-12-27 RX ORDER — TRAZODONE HYDROCHLORIDE 50 MG/1
TABLET ORAL
Qty: 60 TAB | Refills: 3 | Status: SHIPPED | OUTPATIENT
Start: 2018-12-27 | End: 2019-07-24 | Stop reason: SDUPTHER

## 2019-01-11 DIAGNOSIS — Z76.0 MEDICATION REFILL: ICD-10-CM

## 2019-01-11 DIAGNOSIS — J44.9 CHRONIC OBSTRUCTIVE PULMONARY DISEASE, UNSPECIFIED COPD TYPE (HCC): ICD-10-CM

## 2019-01-11 RX ORDER — ALBUTEROL SULFATE 0.83 MG/ML
SOLUTION RESPIRATORY (INHALATION)
Qty: 3 PACKAGE | Refills: 3 | Status: SHIPPED | OUTPATIENT
Start: 2019-01-11 | End: 2020-08-05 | Stop reason: SDUPTHER

## 2019-01-11 RX ORDER — ALBUTEROL SULFATE 90 UG/1
AEROSOL, METERED RESPIRATORY (INHALATION)
Qty: 18 INHALER | Refills: 3 | Status: SHIPPED | OUTPATIENT
Start: 2019-01-11 | End: 2019-05-11 | Stop reason: SDUPTHER

## 2019-01-11 NOTE — TELEPHONE ENCOUNTER
Requested Prescriptions     Pending Prescriptions Disp Refills    albuterol (VENTOLIN HFA) 90 mcg/actuation inhaler 18 Inhaler 3     Sig: inhale 1 to 2 puffs every 4 to 6 hours if needed for shortness of breath    albuterol (PROVENTIL VENTOLIN) 2.5 mg /3 mL (0.083 %) nebulizer solution 3 Package 3     Sig: USe 1 vial every 4-6 hours prn shortness of breath

## 2019-03-04 DIAGNOSIS — Z76.0 MEDICATION REFILL: ICD-10-CM

## 2019-03-04 DIAGNOSIS — M25.552 BILATERAL HIP PAIN: ICD-10-CM

## 2019-03-04 DIAGNOSIS — M25.551 BILATERAL HIP PAIN: ICD-10-CM

## 2019-03-05 RX ORDER — TRAMADOL HYDROCHLORIDE 50 MG/1
50 TABLET ORAL
Qty: 240 TAB | Refills: 0 | Status: SHIPPED | OUTPATIENT
Start: 2019-03-05 | End: 2019-05-28 | Stop reason: SDUPTHER

## 2019-03-05 NOTE — TELEPHONE ENCOUNTER
PCP: Petty Melton MD    Last appt: 12/18/2018  No future appointments. Requested Prescriptions     Pending Prescriptions Disp Refills    traMADol (ULTRAM) 50 mg tablet 240 Tab 0     Sig: Take 1 Tab by mouth every six (6) hours as needed for Pain. Request for a 30 or 90 day supply? Provider Discretion    Pharmacy: [address] [place] [address]    Other Comments:   printed and placed in PCP medication refill review folder.      Last UDS date: 12/18/2018  Pain contract signed: 12/18/2018

## 2019-03-05 NOTE — TELEPHONE ENCOUNTER
Checked  and on aberrancies seen. Printed rx for:    Requested Prescriptions     Signed Prescriptions Disp Refills    traMADol (ULTRAM) 50 mg tablet 240 Tab 0     Sig: Take 1 Tab by mouth every six (6) hours as needed for Pain for up to 30 days. Authorizing Provider: Yvon Hector     Please let pt know that this is ready for .

## 2019-05-11 DIAGNOSIS — J44.9 CHRONIC OBSTRUCTIVE PULMONARY DISEASE, UNSPECIFIED COPD TYPE (HCC): ICD-10-CM

## 2019-05-11 DIAGNOSIS — Z76.0 MEDICATION REFILL: ICD-10-CM

## 2019-05-13 RX ORDER — GUAIFENESIN 100 MG/5ML
LIQUID (ML) ORAL
Qty: 90 TAB | Refills: 3 | Status: SHIPPED | OUTPATIENT
Start: 2019-05-13 | End: 2020-06-22 | Stop reason: SDUPTHER

## 2019-05-13 RX ORDER — SIMVASTATIN 10 MG/1
TABLET, FILM COATED ORAL
Qty: 90 TAB | Refills: 3 | Status: SHIPPED | OUTPATIENT
Start: 2019-05-13 | End: 2020-06-01

## 2019-05-13 RX ORDER — ALBUTEROL SULFATE 90 UG/1
AEROSOL, METERED RESPIRATORY (INHALATION)
Qty: 3 INHALER | Refills: 3 | Status: SHIPPED | OUTPATIENT
Start: 2019-05-13 | End: 2019-12-11 | Stop reason: SDUPTHER

## 2019-05-22 ENCOUNTER — TELEPHONE (OUTPATIENT)
Dept: INTERNAL MEDICINE CLINIC | Age: 64
End: 2019-05-22

## 2019-05-22 DIAGNOSIS — Z76.0 MEDICATION REFILL: ICD-10-CM

## 2019-05-22 DIAGNOSIS — M25.551 BILATERAL HIP PAIN: ICD-10-CM

## 2019-05-22 DIAGNOSIS — M25.552 BILATERAL HIP PAIN: ICD-10-CM

## 2019-05-22 DIAGNOSIS — G89.29 OTHER CHRONIC PAIN: Primary | ICD-10-CM

## 2019-05-28 RX ORDER — TRAMADOL HYDROCHLORIDE 50 MG/1
50 TABLET ORAL
Qty: 240 TAB | Refills: 0 | Status: SHIPPED | OUTPATIENT
Start: 2019-05-28 | End: 2019-08-06 | Stop reason: SDUPTHER

## 2019-05-28 NOTE — TELEPHONE ENCOUNTER
PCP: Ten Christensen MD    Last appt: 4/16/2019  No future appointments. Requested Prescriptions      No prescriptions requested or ordered in this encounter       Request for a 30 or 90 day supply? Provider Discretion    [facility]: [facility]    Other Comments:   printed and placed in PCP medication refill review folder.      Last UDS date: 12/18/2018  Pain contract signed: 1/22/2019

## 2019-05-28 NOTE — TELEPHONE ENCOUNTER
Checked  and no aberrancies seen. Printed rx for:    Requested Prescriptions     Signed Prescriptions Disp Refills    traMADol (ULTRAM) 50 mg tablet 240 Tab 0     Sig: Take 1 Tab by mouth every six (6) hours as needed for Pain for up to 30 days. Authorizing Provider: Coty Hernadez     Please let pt know that this is ready for . Pt needs UDS before getting refill.

## 2019-05-29 ENCOUNTER — HOSPITAL ENCOUNTER (OUTPATIENT)
Dept: LAB | Age: 64
Discharge: HOME | End: 2019-05-29
Payer: MEDICARE

## 2019-05-29 DIAGNOSIS — G89.29 OTHER CHRONIC PAIN: ICD-10-CM

## 2019-05-29 PROCEDURE — G0480 DRUG TEST DEF 1-7 CLASSES: HCPCS

## 2019-06-05 LAB — DRUGS UR: NORMAL

## 2019-06-18 DIAGNOSIS — K59.00 CONSTIPATION, UNSPECIFIED CONSTIPATION TYPE: ICD-10-CM

## 2019-06-18 RX ORDER — DOCUSATE SODIUM 100 MG/1
CAPSULE, LIQUID FILLED ORAL
Qty: 180 CAP | Refills: 3 | Status: SHIPPED | OUTPATIENT
Start: 2019-06-18 | End: 2020-03-16 | Stop reason: SDUPTHER

## 2019-07-03 RX ORDER — TRAMADOL HYDROCHLORIDE 50 MG/1
TABLET ORAL
Qty: 240 TAB | Refills: 0 | OUTPATIENT
Start: 2019-07-03

## 2019-07-03 NOTE — TELEPHONE ENCOUNTER
Patient contacted at home number. 2 patient identifiers confirmed. Patient informed of below. Patient states she already had the prescription filled and the refill request was a mistake. No other questions at this time.

## 2019-07-03 NOTE — TELEPHONE ENCOUNTER
Denied:    Requested Prescriptions     Refused Prescriptions Disp Refills    traMADol (ULTRAM) 50 mg tablet [Pharmacy Med Name: TRAMADOL HCL 50 MG TABLET] 240 Tab 0     Sig: take 1 tablet by mouth every 6 hours if needed for pain     Refused By: Gerardo Mccall     Reason for Refusal: Patient has requested refill too soon     Checked  and this was just filled on 7/1/19. This is not due until 8/1/19.

## 2019-07-24 DIAGNOSIS — Z76.0 MEDICATION REFILL: ICD-10-CM

## 2019-07-24 RX ORDER — TRAZODONE HYDROCHLORIDE 50 MG/1
TABLET ORAL
Qty: 60 TAB | Refills: 3 | Status: SHIPPED | OUTPATIENT
Start: 2019-07-24 | End: 2020-04-14 | Stop reason: SDUPTHER

## 2019-08-01 DIAGNOSIS — M62.838 MUSCLE SPASM: ICD-10-CM

## 2019-08-01 RX ORDER — BACLOFEN 10 MG/1
TABLET ORAL
Qty: 90 TAB | Refills: 3 | Status: SHIPPED | OUTPATIENT
Start: 2019-08-01 | End: 2019-11-11 | Stop reason: SDUPTHER

## 2019-08-02 ENCOUNTER — TELEPHONE (OUTPATIENT)
Dept: INTERNAL MEDICINE CLINIC | Age: 64
End: 2019-08-02

## 2019-08-02 DIAGNOSIS — M25.552 BILATERAL HIP PAIN: ICD-10-CM

## 2019-08-02 DIAGNOSIS — M25.551 BILATERAL HIP PAIN: ICD-10-CM

## 2019-08-02 DIAGNOSIS — Z76.0 MEDICATION REFILL: ICD-10-CM

## 2019-08-06 RX ORDER — TRAMADOL HYDROCHLORIDE 50 MG/1
50 TABLET ORAL
Qty: 240 TAB | Refills: 0 | Status: SHIPPED | OUTPATIENT
Start: 2019-08-06 | End: 2019-10-10 | Stop reason: SDUPTHER

## 2019-08-06 NOTE — TELEPHONE ENCOUNTER
Checked  and no aberrancies seen. Please let pt know that her rx is ready for . Printed rx for:  Requested Prescriptions     Signed Prescriptions Disp Refills    traMADol (ULTRAM) 50 mg tablet 240 Tab 0     Sig: Take 1 Tab by mouth every six (6) hours as needed for Pain for up to 30 days.      Authorizing Provider: Adrian Cui

## 2019-08-06 NOTE — TELEPHONE ENCOUNTER
PCP: Nora Ellington MD    Last appt: 5/29/2019  No future appointments. Request for a 30 or 90 day supply? Provider Discretion    Pharmacy: [address] [place] [address]    Other Comments:   printed and placed in PCP medication refill review folder.      Last UDS date: 05/29/2019    Pain contract signed: 01/22/2019

## 2019-08-09 RX ORDER — MONTELUKAST SODIUM 10 MG/1
TABLET ORAL
Qty: 90 TAB | Refills: 3 | Status: SHIPPED | OUTPATIENT
Start: 2019-08-09 | End: 2020-07-24 | Stop reason: SDUPTHER

## 2019-10-06 DIAGNOSIS — K21.9 GASTROESOPHAGEAL REFLUX DISEASE WITHOUT ESOPHAGITIS: ICD-10-CM

## 2019-10-06 DIAGNOSIS — Z76.0 MEDICATION REFILL: ICD-10-CM

## 2019-10-07 RX ORDER — OMEPRAZOLE 20 MG/1
CAPSULE, DELAYED RELEASE ORAL
Qty: 90 CAP | Refills: 1 | Status: SHIPPED | OUTPATIENT
Start: 2019-10-07 | End: 2020-04-14 | Stop reason: SDUPTHER

## 2019-10-07 NOTE — TELEPHONE ENCOUNTER
Sent electronically:    Requested Prescriptions     Signed Prescriptions Disp Refills    omeprazole (PRILOSEC) 20 mg capsule 90 Cap 1     Sig: take 1 capsule by mouth once daily     Authorizing Provider: Roger Gaitan     Pt needs to make an appt. Last seen on 12/2018.

## 2019-10-10 DIAGNOSIS — M25.552 BILATERAL HIP PAIN: ICD-10-CM

## 2019-10-10 DIAGNOSIS — Z76.0 MEDICATION REFILL: ICD-10-CM

## 2019-10-10 DIAGNOSIS — M25.551 BILATERAL HIP PAIN: ICD-10-CM

## 2019-10-10 RX ORDER — TRAMADOL HYDROCHLORIDE 50 MG/1
50 TABLET ORAL
Qty: 240 TAB | Refills: 0 | Status: SHIPPED | OUTPATIENT
Start: 2019-10-10 | End: 2019-12-31 | Stop reason: SDUPTHER

## 2019-10-10 NOTE — TELEPHONE ENCOUNTER
PCP: Izabela Washington MD    Last appt: 5/29/2019  Future Appointments   Date Time Provider Pippa Yoon   12/11/2019 11:45 AM EBER Gray SCHED       Requested Prescriptions     Pending Prescriptions Disp Refills    traMADol (ULTRAM) 50 mg tablet 240 Tab 0     Sig: Take 1 Tab by mouth every six (6) hours as needed for Pain for up to 30 days. Request for a 30 or 90 day supply? Provider Discretion    [facility]: [facility]    Other Comments:   printed and placed in PCP medication refill review folder.      Last UDS date: 05/29/2019  Pain contract signed: 1/22/2019

## 2019-11-05 PROCEDURE — 99281 EMR DPT VST MAYX REQ PHY/QHP: CPT

## 2019-11-06 ENCOUNTER — APPOINTMENT (OUTPATIENT)
Dept: GENERAL RADIOLOGY | Age: 64
End: 2019-11-06
Attending: EMERGENCY MEDICINE
Payer: MEDICARE

## 2019-11-06 ENCOUNTER — HOSPITAL ENCOUNTER (EMERGENCY)
Age: 64
Discharge: HOME | End: 2019-11-06
Attending: EMERGENCY MEDICINE | Admitting: EMERGENCY MEDICINE
Payer: MEDICARE

## 2019-11-06 VITALS
HEART RATE: 64 BPM | RESPIRATION RATE: 16 BRPM | DIASTOLIC BLOOD PRESSURE: 87 MMHG | BODY MASS INDEX: 24.8 KG/M2 | WEIGHT: 140 LBS | SYSTOLIC BLOOD PRESSURE: 138 MMHG | OXYGEN SATURATION: 97 % | TEMPERATURE: 98 F | HEIGHT: 63 IN

## 2019-11-06 DIAGNOSIS — J44.9 CHRONIC OBSTRUCTIVE PULMONARY DISEASE, UNSPECIFIED COPD TYPE (HCC): ICD-10-CM

## 2019-11-06 DIAGNOSIS — R07.89 LEFT-SIDED CHEST WALL PAIN: Primary | ICD-10-CM

## 2019-11-06 DIAGNOSIS — R06.02 SOB (SHORTNESS OF BREATH): ICD-10-CM

## 2019-11-06 LAB
ALBUMIN SERPL-MCNC: 3.7 G/DL (ref 3.4–5)
ALBUMIN/GLOB SERPL: 1.2 {RATIO} (ref 0.8–1.7)
ALP SERPL-CCNC: 69 U/L (ref 45–117)
ALT SERPL-CCNC: 16 U/L (ref 13–56)
ANION GAP SERPL CALC-SCNC: 5 MMOL/L (ref 3–18)
AST SERPL-CCNC: 17 U/L (ref 10–38)
BASOPHILS # BLD: 0 K/UL (ref 0–0.1)
BASOPHILS NFR BLD: 0 % (ref 0–2)
BILIRUB SERPL-MCNC: 0.4 MG/DL (ref 0.2–1)
BNP SERPL-MCNC: 29 PG/ML (ref 0–900)
BUN SERPL-MCNC: 11 MG/DL (ref 7–18)
BUN/CREAT SERPL: 13 (ref 12–20)
CALCIUM SERPL-MCNC: 9.2 MG/DL (ref 8.5–10.1)
CHLORIDE SERPL-SCNC: 110 MMOL/L (ref 100–111)
CO2 SERPL-SCNC: 26 MMOL/L (ref 21–32)
CREAT SERPL-MCNC: 0.87 MG/DL (ref 0.6–1.3)
D DIMER PPP FEU-MCNC: 0.44 UG/ML(FEU)
DIFFERENTIAL METHOD BLD: NORMAL
EOSINOPHIL # BLD: 0.1 K/UL (ref 0–0.4)
EOSINOPHIL NFR BLD: 2 % (ref 0–5)
ERYTHROCYTE [DISTWIDTH] IN BLOOD BY AUTOMATED COUNT: 14.2 % (ref 11.6–14.5)
GLOBULIN SER CALC-MCNC: 3.1 G/DL (ref 2–4)
GLUCOSE SERPL-MCNC: 96 MG/DL (ref 74–99)
HCT VFR BLD AUTO: 39.9 % (ref 35–45)
HGB BLD-MCNC: 13.1 G/DL (ref 12–16)
LYMPHOCYTES # BLD: 3.3 K/UL (ref 0.9–3.6)
LYMPHOCYTES NFR BLD: 38 % (ref 21–52)
MCH RBC QN AUTO: 28.8 PG (ref 24–34)
MCHC RBC AUTO-ENTMCNC: 32.8 G/DL (ref 31–37)
MCV RBC AUTO: 87.7 FL (ref 74–97)
MONOCYTES # BLD: 0.9 K/UL (ref 0.05–1.2)
MONOCYTES NFR BLD: 10 % (ref 3–10)
NEUTS SEG # BLD: 4.4 K/UL (ref 1.8–8)
NEUTS SEG NFR BLD: 50 % (ref 40–73)
PLATELET # BLD AUTO: 204 K/UL (ref 135–420)
PMV BLD AUTO: 9.2 FL (ref 9.2–11.8)
POTASSIUM SERPL-SCNC: 3.9 MMOL/L (ref 3.5–5.5)
PROT SERPL-MCNC: 6.8 G/DL (ref 6.4–8.2)
RBC # BLD AUTO: 4.55 M/UL (ref 4.2–5.3)
SODIUM SERPL-SCNC: 141 MMOL/L (ref 136–145)
TROPONIN I SERPL-MCNC: <0.02 NG/ML (ref 0–0.04)
WBC # BLD AUTO: 8.7 K/UL (ref 4.6–13.2)

## 2019-11-06 PROCEDURE — 74011250637 HC RX REV CODE- 250/637: Performed by: EMERGENCY MEDICINE

## 2019-11-06 PROCEDURE — 71046 X-RAY EXAM CHEST 2 VIEWS: CPT

## 2019-11-06 PROCEDURE — 74011000250 HC RX REV CODE- 250

## 2019-11-06 PROCEDURE — 80053 COMPREHEN METABOLIC PANEL: CPT

## 2019-11-06 PROCEDURE — 84484 ASSAY OF TROPONIN QUANT: CPT

## 2019-11-06 PROCEDURE — 83880 ASSAY OF NATRIURETIC PEPTIDE: CPT

## 2019-11-06 PROCEDURE — 93005 ELECTROCARDIOGRAM TRACING: CPT

## 2019-11-06 PROCEDURE — 85379 FIBRIN DEGRADATION QUANT: CPT

## 2019-11-06 PROCEDURE — 85025 COMPLETE CBC W/AUTO DIFF WBC: CPT

## 2019-11-06 RX ORDER — IPRATROPIUM BROMIDE AND ALBUTEROL SULFATE 2.5; .5 MG/3ML; MG/3ML
3 SOLUTION RESPIRATORY (INHALATION)
Status: COMPLETED | OUTPATIENT
Start: 2019-11-06 | End: 2019-11-06

## 2019-11-06 RX ORDER — ACETAMINOPHEN 500 MG
1000 TABLET ORAL
Status: COMPLETED | OUTPATIENT
Start: 2019-11-06 | End: 2019-11-06

## 2019-11-06 RX ORDER — IPRATROPIUM BROMIDE AND ALBUTEROL SULFATE 2.5; .5 MG/3ML; MG/3ML
SOLUTION RESPIRATORY (INHALATION)
Status: COMPLETED
Start: 2019-11-06 | End: 2019-11-06

## 2019-11-06 RX ADMIN — IPRATROPIUM BROMIDE AND ALBUTEROL SULFATE 3 ML: .5; 3 SOLUTION RESPIRATORY (INHALATION) at 00:39

## 2019-11-06 RX ADMIN — ACETAMINOPHEN 1000 MG: 500 TABLET, FILM COATED ORAL at 00:38

## 2019-11-06 RX ADMIN — IPRATROPIUM BROMIDE AND ALBUTEROL SULFATE 3 ML: 2.5; .5 SOLUTION RESPIRATORY (INHALATION) at 00:39

## 2019-11-06 NOTE — ED TRIAGE NOTES
Ambulatory to triage. C/o productive cough x4 days with sharp, left-sided chest pain and shortness of breath. \"I was sitting on the side of the bed when I got a sharp pain in my heart and felt like I couldn't breathe and I got scared. \"

## 2019-11-06 NOTE — DISCHARGE INSTRUCTIONS
Patient Education        Chronic Obstructive Pulmonary Disease (COPD): Care Instructions  Your Care Instructions    Chronic obstructive pulmonary disease (COPD) is a general term for a group of lung diseases, including emphysema and chronic bronchitis. People with COPD have decreased airflow in and out of the lungs, which makes it hard to breathe. The airways also can get clogged with thick mucus. Cigarette smoking is a major cause of COPD. Although there is no cure for COPD, you can slow its progress. Following your treatment plan and taking care of yourself can help you feel better and live longer. Follow-up care is a key part of your treatment and safety. Be sure to make and go to all appointments, and call your doctor if you are having problems. It's also a good idea to know your test results and keep a list of the medicines you take. How can you care for yourself at home?   Staying healthy    · Do not smoke. This is the most important step you can take to prevent more damage to your lungs. If you need help quitting, talk to your doctor about stop-smoking programs and medicines. These can increase your chances of quitting for good.     · Avoid colds and flu. Get a pneumococcal vaccine shot. If you have had one before, ask your doctor whether you need a second dose. Get the flu vaccine every fall. If you must be around people with colds or the flu, wash your hands often.     · Avoid secondhand smoke, air pollution, and high altitudes. Also avoid cold, dry air and hot, humid air. Stay at home with your windows closed when air pollution is bad.    Medicines and oxygen therapy    · Take your medicines exactly as prescribed. Call your doctor if you think you are having a problem with your medicine.     · You may be taking medicines such as:  ? Bronchodilators. These help open your airways and make breathing easier. Bronchodilators are either short-acting (work for 6 to 9 hours) or long-acting (work for 24 hours). You inhale most bronchodilators, so they start to act quickly. Always carry your quick-relief inhaler with you in case you need it while you are away from home. ? Corticosteroids (prednisone, budesonide). These reduce airway inflammation. They come in pill or inhaled form. You must take these medicines every day for them to work well.     · A spacer may help you get more inhaled medicine to your lungs. Ask your doctor or pharmacist if a spacer is right for you. If it is, ask how to use it properly.     · Do not take any vitamins, over-the-counter medicine, or herbal products without talking to your doctor first.     · If your doctor prescribed antibiotics, take them as directed. Do not stop taking them just because you feel better. You need to take the full course of antibiotics.     · Oxygen therapy boosts the amount of oxygen in your blood and helps you breathe easier. Use the flow rate your doctor has recommended, and do not change it without talking to your doctor first.   Activity    · Get regular exercise. Walking is an easy way to get exercise. Start out slowly, and walk a little more each day.     · Pay attention to your breathing. You are exercising too hard if you cannot talk while you are exercising.     · Take short rest breaks when doing household chores and other activities.     · Learn breathing methods--such as breathing through pursed lips--to help you become less short of breath.     · If your doctor has not set you up with a pulmonary rehabilitation program, talk to him or her about whether rehab is right for you. Rehab includes exercise programs, education about your disease and how to manage it, help with diet and other changes, and emotional support. Diet    · Eat regular, healthy meals. Use bronchodilators about 1 hour before you eat to make it easier to eat. Eat several small meals instead of three large ones.  Drink beverages at the end of the meal. Avoid foods that are hard to chew.     · Eat foods that contain protein so that you do not lose muscle mass.     · Talk with your doctor if you gain too much weight or if you lose weight without trying.    Mental health    · Talk to your family, friends, or a therapist about your feelings. It is normal to feel frightened, angry, hopeless, helpless, and even guilty. Talking openly about bad feelings can help you cope. If these feelings last, talk to your doctor. When should you call for help? Call 911 anytime you think you may need emergency care. For example, call if:    · You have severe trouble breathing.    Call your doctor now or seek immediate medical care if:    · You have new or worse trouble breathing.     · You cough up blood.     · You have a fever.    Watch closely for changes in your health, and be sure to contact your doctor if:    · You cough more deeply or more often, especially if you notice more mucus or a change in the color of your mucus.     · You have new or worse swelling in your legs or belly.     · You are not getting better as expected. Where can you learn more? Go to http://stevo-tram.info/. [place] in the search box to learn more about \"Chronic Obstructive Pulmonary Disease (COPD): Care Instructions. \"  Current as of: June 9, 2019  Content Version: 12.2  © 3564-4537 [employer]. Care instructions adapted under license by [employer] (which disclaims liability or warranty for this information). If you have questions about a medical condition or this instruction, always ask your healthcare professional. [place] [address] any warranty or liability for your use of this information. Patient Education        Musculoskeletal Chest Pain: Care Instructions  Your Care Instructions    Chest pain is not always a sign that something is wrong with your heart or that you have another serious problem.  The doctor thinks your chest pain is caused by strained muscles or ligaments, inflamed chest cartilage, or another problem in your chest, rather than by your heart. You may need more tests to find the cause of your chest pain. Follow-up care is a key part of your treatment and safety. Be sure to make and go to all appointments, and call your doctor if you are having problems. It's also a good idea to know your test results and keep a list of the medicines you take. How can you care for yourself at home? · Take pain medicines exactly as directed. ? If the doctor gave you a prescription medicine for pain, take it as prescribed. ? If you are not taking a prescription pain medicine, ask your doctor if you can take an over-the-counter medicine. · Rest and protect the sore area. · Stop, change, or take a break from any activity that may be causing your pain or soreness. · Put ice or a cold pack on the sore area for 10 to 20 minutes at a time. Try to do this every 1 to 2 hours for the next 3 days (when you are awake) or until the swelling goes down. Put a thin cloth between the ice and your skin. · After 2 or 3 days, apply a heating pad set on low or a warm cloth to the area that hurts. Some doctors suggest that you go back and forth between hot and cold. · Do not wrap or tape your ribs for support. This may cause you to take smaller breaths, which could increase your risk of lung problems. · Mentholated creams such as Bengay or Icy Hot may soothe sore muscles. Follow the instructions on the package. · Follow your doctor's instructions for exercising. · Gentle stretching and massage may help you get better faster. Stretch slowly to the point just before pain begins, and hold the stretch for at least 15 to 30 seconds. Do this 3 or 4 times a day. Stretch just after you have applied heat. · As your pain gets better, slowly return to your normal activities. Any increased pain may be a sign that you need to rest a while longer. When should you call for help?   Call 911 anytime you think you may need emergency care. For example, call if:    · You have chest pain or pressure. This may occur with:  ? Sweating. ? Shortness of breath. ? Nausea or vomiting. ? Pain that spreads from the chest to the neck, jaw, or one or both shoulders or arms. ? Dizziness or lightheadedness. ? A fast or uneven pulse. After calling 911, chew 1 adult-strength aspirin. Wait for an ambulance. Do not try to drive yourself.     · You have sudden chest pain and shortness of breath, or you cough up blood.    Call your doctor now or seek immediate medical care if:    · You have any trouble breathing.     · Your chest pain gets worse.     · Your chest pain occurs consistently with exercise and is relieved by rest.    Watch closely for changes in your health, and be sure to contact your doctor if:    · Your chest pain does not get better after 1 week. Where can you learn more? Go to http://stevo-tram.info/. Enter V293 in the search box to learn more about \"Musculoskeletal Chest Pain: Care Instructions. \"  Current as of: June 26, 2019  Content Version: 12.2  © 3620-6031 [employer]. Care instructions adapted under license by [employer] (which disclaims liability or warranty for this information). If you have questions about a medical condition or this instruction, always ask your healthcare professional. [address] any warranty or liability for your use of this information.
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
## 2019-11-07 LAB
ATRIAL RATE: 66 BPM
CALCULATED P AXIS, ECG09: 52 DEGREES
CALCULATED R AXIS, ECG10: 25 DEGREES
CALCULATED T AXIS, ECG11: 59 DEGREES
DIAGNOSIS, 93000: NORMAL
P-R INTERVAL, ECG05: 142 MS
Q-T INTERVAL, ECG07: 408 MS
QRS DURATION, ECG06: 76 MS
QTC CALCULATION (BEZET), ECG08: 427 MS
VENTRICULAR RATE, ECG03: 66 BPM

## 2019-11-11 DIAGNOSIS — M62.838 MUSCLE SPASM: ICD-10-CM

## 2019-11-11 RX ORDER — BACLOFEN 10 MG/1
TABLET ORAL
Qty: 90 TAB | Refills: 3 | Status: SHIPPED | OUTPATIENT
Start: 2019-11-11 | End: 2020-05-06

## 2019-12-11 ENCOUNTER — OFFICE VISIT (OUTPATIENT)
Dept: INTERNAL MEDICINE CLINIC | Age: 64
End: 2019-12-11

## 2019-12-11 ENCOUNTER — HOSPITAL ENCOUNTER (OUTPATIENT)
Dept: LAB | Age: 64
Discharge: HOME | End: 2019-12-11
Payer: MEDICARE

## 2019-12-11 VITALS
SYSTOLIC BLOOD PRESSURE: 121 MMHG | DIASTOLIC BLOOD PRESSURE: 83 MMHG | HEIGHT: 63 IN | BODY MASS INDEX: 25.16 KG/M2 | TEMPERATURE: 98.6 F | HEART RATE: 76 BPM | RESPIRATION RATE: 18 BRPM | WEIGHT: 142 LBS | OXYGEN SATURATION: 100 %

## 2019-12-11 DIAGNOSIS — R52 PAIN MANAGEMENT: ICD-10-CM

## 2019-12-11 DIAGNOSIS — M25.512 CHRONIC LEFT SHOULDER PAIN: ICD-10-CM

## 2019-12-11 DIAGNOSIS — J44.9 CHRONIC OBSTRUCTIVE PULMONARY DISEASE, UNSPECIFIED COPD TYPE (HCC): ICD-10-CM

## 2019-12-11 DIAGNOSIS — K59.09 CHRONIC CONSTIPATION: ICD-10-CM

## 2019-12-11 DIAGNOSIS — M25.512 CHRONIC LEFT SHOULDER PAIN: Primary | ICD-10-CM

## 2019-12-11 DIAGNOSIS — R73.03 PREDIABETES: ICD-10-CM

## 2019-12-11 DIAGNOSIS — E78.5 HYPERLIPIDEMIA, UNSPECIFIED HYPERLIPIDEMIA TYPE: ICD-10-CM

## 2019-12-11 DIAGNOSIS — G89.29 CHRONIC LEFT SHOULDER PAIN: ICD-10-CM

## 2019-12-11 DIAGNOSIS — G89.29 CHRONIC LEFT SHOULDER PAIN: Primary | ICD-10-CM

## 2019-12-11 DIAGNOSIS — R63.0 DECREASED APPETITE: ICD-10-CM

## 2019-12-11 LAB
APPEARANCE UR: CLEAR
BILIRUB UR QL: NEGATIVE
COLOR UR: YELLOW
GLUCOSE UR STRIP.AUTO-MCNC: NEGATIVE MG/DL
HBA1C MFR BLD: 5.9 % (ref 4.2–5.6)
HGB UR QL STRIP: NEGATIVE
KETONES UR QL STRIP.AUTO: NEGATIVE MG/DL
LEUKOCYTE ESTERASE UR QL STRIP.AUTO: NEGATIVE
NITRITE UR QL STRIP.AUTO: NEGATIVE
PH UR STRIP: 7 [PH] (ref 5–8)
PROT UR STRIP-MCNC: NEGATIVE MG/DL
SP GR UR REFRACTOMETRY: 1.02 (ref 1–1.03)
UROBILINOGEN UR QL STRIP.AUTO: 0.2 EU/DL (ref 0.2–1)

## 2019-12-11 PROCEDURE — 80061 LIPID PANEL: CPT

## 2019-12-11 PROCEDURE — G0480 DRUG TEST DEF 1-7 CLASSES: HCPCS

## 2019-12-11 PROCEDURE — 83036 HEMOGLOBIN GLYCOSYLATED A1C: CPT

## 2019-12-11 PROCEDURE — 81003 URINALYSIS AUTO W/O SCOPE: CPT

## 2019-12-11 RX ORDER — ALBUTEROL SULFATE 90 UG/1
AEROSOL, METERED RESPIRATORY (INHALATION)
Qty: 3 INHALER | Refills: 3 | Status: SHIPPED | OUTPATIENT
Start: 2019-12-11 | End: 2021-01-19

## 2019-12-11 NOTE — PROGRESS NOTES
HISTORY OF PRESENT ILLNESS  Alisa Oconnell is a 60 y.o. female. HPI  Ms. Eulalio Baer presents for chronic left shoulder pain and refill of Tramadol. She reports she has tried other narcotics and other OTC pain meds. She takes 3-4 Tramadol per day. Hx of pain management and does not wish to entertain this again. C/o transportation problems with this. -  with no aberrancies. 2) C/o chronic constipation. Denies benefit of Miralax. Admits 2/2 chronic Tramadol.   - Hx of Linzess. Not taking.   - Reports hx of colonoscopy 2 years ago at [facility], but I am not seeing this. Desires GI consultation for chronic constipation and decreased appetite. Review of Systems   Gastrointestinal: Positive for constipation. Musculoskeletal: Positive for joint pain (L shoulder). Visit Vitals  /83 (BP 1 Location: Right arm, BP Patient Position: Sitting)   Pulse 76   Temp 98.6 °F (37 °C) (Oral)   Resp 18   Ht 5' 3\" (1.6 m)   Wt 142 lb (64.4 kg)   SpO2 100%   BMI 25.15 kg/m²       Physical Exam  Constitutional:       General: She is not in acute distress. Appearance: Normal appearance. She is well-developed. HENT:      Head: Normocephalic and atraumatic. Right Ear: Tympanic membrane, ear canal and external ear normal.      Left Ear: Tympanic membrane, ear canal and external ear normal.      Nose: Nose normal.      Mouth/Throat:      Mouth: Mucous membranes are moist.      Pharynx: Uvula midline. No oropharyngeal exudate or posterior oropharyngeal erythema. Tonsils: No tonsillar abscesses. Eyes:      General: No scleral icterus. Conjunctiva/sclera: Conjunctivae normal.      Pupils: Pupils are equal, round, and reactive to light. Neck:      Musculoskeletal: Neck supple. Cardiovascular:      Rate and Rhythm: Normal rate and regular rhythm. Pulses: Normal pulses. Dorsalis pedis pulses are 2+ on the right side and 2+ on the left side.         Posterior tibial pulses are 2+ on the right side and 2+ on the left side. Heart sounds: Normal heart sounds. No murmur. No gallop. Pulmonary:      Effort: Pulmonary effort is normal. No respiratory distress. Breath sounds: Normal breath sounds. No decreased breath sounds, wheezing, rhonchi or rales. Musculoskeletal:      Right lower leg: No edema. Left lower leg: No edema. Lymphadenopathy:      Head:      Right side of head: No submandibular or tonsillar adenopathy. Left side of head: No submandibular or tonsillar adenopathy. Cervical: No cervical adenopathy. Upper Body:      Right upper body: No supraclavicular adenopathy. Left upper body: No supraclavicular adenopathy. Skin:     General: Skin is warm and dry. Neurological:      Mental Status: She is alert and oriented to person, place, and time. Psychiatric:         Speech: Speech normal.         ASSESSMENT and PLAN  Diagnoses and all orders for this visit:    1. Chronic left shoulder pain  2. Pain management  -     TOXASSURE SELECT 13 (MW); Future  - Tramadol just refilled in December.   - Counseled must adhere to q3mos visits to receive med refills. 3. Chronic constipation  -     linaCLOtide (LINZESS) 145 mcg cap capsule; Take 1 Cap by mouth Daily (before breakfast). For constipation.   - Has not really tried this. Desires refill.  -     REFERRAL TO GASTROENTEROLOGY    4. Decreased appetite  -     REFERRAL TO GASTROENTEROLOGY    5. Chronic obstructive pulmonary disease, unspecified COPD type (HCC)  -     albuterol (VENTOLIN HFA) 90 mcg/actuation inhaler; inhale 1 to 2 puffs by mouth every 4 to 6 hours if needed for shortness of breath  -     tiotropium (SPIRIVA WITH HANDIHALER) 18 mcg inhalation capsule; inhale the contents of one capsule in the handihaler once daily   - Resume. 6. Prediabetes  -     HEMOGLOBIN A1C W/O EAG; Future  -     URINALYSIS W/ RFLX MICROSCOPIC; Future    7.  Hyperlipidemia, unspecified hyperlipidemia type  -     LIPID PANEL; Future      Follow-up and Dispositions    · Return in about 3 months (around 3/11/2020) for pain management.

## 2019-12-11 NOTE — PROGRESS NOTES
Rm: 11    Chief Complaint   Patient presents with    Shoulder Pain     left    Breast pain     left    Constipation     pt states that she is not able to have bowel movement unless a laxative is taken     Depression Screening:  3 most recent [facility] 12/11/2019 12/11/2019 7/24/2018 2/15/2018 10/23/2017 5/11/2017 6/29/2016   Little interest or pleasure in doing things Not at all Not at all Not at all Not at all Not at all Not at all Not at all   Feeling down, depressed, irritable, or hopeless Not at all Not at all Not at all Not at all Not at all Not at all Not at all   Total Score PHQ 2 0 0 0 0 0 0 0       Learning Assessment:  Learning Assessment 5/3/2016 6/11/2015 11/26/2014 6/19/2014 5/29/2014 4/28/2014   PRIMARY LEARNER Patient Patient Patient Patient Patient Patient   HIGHEST LEVEL OF EDUCATION - PRIMARY LEARNER  DID NOT GRADUATE HIGH SCHOOL DID NOT GRADUATE HIGH SCHOOL - - DID NOT GRADUATE HIGH SCHOOL DID NOT GRADUATE [facility] LEARNER NONE NONE - - - [address] CAREGIVER - - - - - No   PRIMARY [address] [facility]   LEARNER PREFERENCE PRIMARY DEMONSTRATION DEMONSTRATION DEMONSTRATION DEMONSTRATION OTHER (COMMENT) READING   ANSWERED BY patient  Patient patient patient patient Patient   RELATIONSHIP SELF SELF SELF SELF SELF SELF       Abuse Screening:  Abuse Screening Questionnaire 2/15/2018 10/23/2017 6/11/2015   Do you ever feel afraid of your partner? N N N   Are you in a relationship with someone who physically or mentally threatens you? N N N   Is it safe for you to go home? Phuong Smoker       Health Maintenance reviewed and discussed per provider: yes     Coordination of Care:    1. Have you been to the ER, urgent care clinic since your last visit? Hospitalized since your last visit? no    2. Have you seen or consulted any other health care providers outside of the [address] [place] [address] since your last visit?   Include any pap smears or colon screening.  no

## 2019-12-12 LAB
CHOLEST SERPL-MCNC: 164 MG/DL
HDLC SERPL-MCNC: 83 MG/DL (ref 40–60)
HDLC SERPL: 2 {RATIO} (ref 0–5)
LDLC SERPL CALC-MCNC: 61.8 MG/DL (ref 0–100)
LIPID PROFILE,FLP: ABNORMAL
TRIGL SERPL-MCNC: 96 MG/DL (ref ?–150)
VLDLC SERPL CALC-MCNC: 19.2 MG/DL

## 2019-12-17 LAB — DRUGS UR: NORMAL

## 2020-01-21 ENCOUNTER — HOSPITAL ENCOUNTER (EMERGENCY)
Age: 65
Discharge: HOME | End: 2020-01-21
Attending: EMERGENCY MEDICINE | Admitting: EMERGENCY MEDICINE
Payer: MEDICARE

## 2020-01-21 ENCOUNTER — APPOINTMENT (OUTPATIENT)
Dept: GENERAL RADIOLOGY | Age: 65
End: 2020-01-21
Attending: EMERGENCY MEDICINE
Payer: MEDICARE

## 2020-01-21 VITALS
RESPIRATION RATE: 16 BRPM | TEMPERATURE: 98.6 F | WEIGHT: 140 LBS | HEIGHT: 63 IN | BODY MASS INDEX: 24.8 KG/M2 | DIASTOLIC BLOOD PRESSURE: 87 MMHG | SYSTOLIC BLOOD PRESSURE: 128 MMHG | HEART RATE: 75 BPM | OXYGEN SATURATION: 100 %

## 2020-01-21 DIAGNOSIS — M25.521 RIGHT ELBOW PAIN: Primary | ICD-10-CM

## 2020-01-21 DIAGNOSIS — S42.401A CLOSED FRACTURE OF RIGHT ELBOW, INITIAL ENCOUNTER: ICD-10-CM

## 2020-01-21 DIAGNOSIS — M25.511 ACUTE PAIN OF RIGHT SHOULDER: ICD-10-CM

## 2020-01-21 PROCEDURE — 75810000053 HC SPLINT APPLICATION

## 2020-01-21 PROCEDURE — 74011250637 HC RX REV CODE- 250/637: Performed by: EMERGENCY MEDICINE

## 2020-01-21 PROCEDURE — 74011636637 HC RX REV CODE- 636/637: Performed by: EMERGENCY MEDICINE

## 2020-01-21 PROCEDURE — 73030 X-RAY EXAM OF SHOULDER: CPT

## 2020-01-21 PROCEDURE — A9270 NON-COVERED ITEM OR SERVICE: HCPCS | Performed by: EMERGENCY MEDICINE

## 2020-01-21 PROCEDURE — 99283 EMERGENCY DEPT VISIT LOW MDM: CPT

## 2020-01-21 PROCEDURE — 73080 X-RAY EXAM OF ELBOW: CPT

## 2020-01-21 PROCEDURE — 74011000250 HC RX REV CODE- 250: Performed by: EMERGENCY MEDICINE

## 2020-01-21 RX ORDER — LIDOCAINE 4 G/100G
1 PATCH TOPICAL EVERY 24 HOURS
Status: DISCONTINUED | OUTPATIENT
Start: 2020-01-21 | End: 2020-01-21 | Stop reason: HOSPADM

## 2020-01-21 RX ORDER — ACETAMINOPHEN 325 MG/1
650 TABLET ORAL
Qty: 20 TAB | Refills: 0 | Status: SHIPPED | OUTPATIENT
Start: 2020-01-21 | End: 2022-03-11

## 2020-01-21 RX ORDER — PREDNISONE 20 MG/1
60 TABLET ORAL
Status: COMPLETED | OUTPATIENT
Start: 2020-01-21 | End: 2020-01-21

## 2020-01-21 RX ORDER — ACETAMINOPHEN 500 MG
1000 TABLET ORAL
Status: COMPLETED | OUTPATIENT
Start: 2020-01-21 | End: 2020-01-21

## 2020-01-21 RX ORDER — METHYLPREDNISOLONE 4 MG/1
TABLET ORAL
Qty: 1 DOSE PACK | Refills: 0 | Status: SHIPPED | OUTPATIENT
Start: 2020-01-21 | End: 2022-05-12

## 2020-01-21 RX ORDER — LIDOCAINE 50 MG/G
PATCH TOPICAL
Qty: 30 EACH | Refills: 0 | Status: SHIPPED | OUTPATIENT
Start: 2020-01-21 | End: 2022-05-12

## 2020-01-21 RX ADMIN — ACETAMINOPHEN 1000 MG: 500 TABLET, FILM COATED ORAL at 12:54

## 2020-01-21 RX ADMIN — PREDNISONE 60 MG: 20 TABLET ORAL at 12:54

## 2020-01-21 NOTE — DISCHARGE INSTRUCTIONS
Patient Education        Broken Arm: Care Instructions  Your Care Instructions  Fractures can range from a small, hairline crack, to a bone or bones broken into two or more pieces. Your treatment depends on how bad the break is. Your doctor may have put your arm in a splint or cast to allow it to heal or to keep it stable until you see another doctor. It may take weeks or months for your arm to heal. You can help your arm heal with some care at home. You heal best when you take good care of yourself. Eat a variety of healthy foods, and don't smoke. You may have had a sedative to help you relax. You may be unsteady after having sedation. It can take a few hours for the medicine's effects to wear off. Common side effects of sedation include nausea, vomiting, and feeling sleepy or tired. The doctor has checked you carefully, but problems can develop later. If you notice any problems or new symptoms, get medical treatment right away. Follow-up care is a key part of your treatment and safety. Be sure to make and go to all appointments, and call your doctor if you are having problems. It's also a good idea to know your test results and keep a list of the medicines you take. How can you care for yourself at home? · If the doctor gave you a sedative:  ? For 24 hours, don't do anything that requires attention to detail, such as going to work, making important decisions, or signing any legal documents. It takes time for the medicine's effects to completely wear off.  ? For your safety, do not drive or operate any machinery that could be dangerous. Wait until the medicine wears off and you can think clearly and react easily. · Put ice or a cold pack on your arm for 10 to 20 minutes at a time. Try to do this every 1 to 2 hours for the next 3 days (when you are awake). Put a thin cloth between the ice and your cast or splint. Keep the cast or splint dry. · Follow the cast care instructions your doctor gives you.  If you have a splint, do not take it off unless your doctor tells you to. · Be safe with medicines. Take pain medicines exactly as directed. ? If the doctor gave you a prescription medicine for pain, take it as prescribed. ? If you are not taking a prescription pain medicine, ask your doctor if you can take an over-the-counter medicine. · Prop up your arm on pillows when you sit or lie down in the first few days after the injury. Keep the arm higher than the level of your heart. This will help reduce swelling. · Follow instructions for exercises to keep your arm strong. · Wiggle your fingers and wrist often to reduce swelling and stiffness. When should you call for help? Call 911 anytime you think you may need emergency care. For example, call if:    · You are very sleepy and you have trouble waking up.    Call your doctor now or seek immediate medical care if:    · You have new or worse nausea or vomiting.     · You have new or worse pain.     · Your hand or fingers are cool or pale or change color.     · Your cast or splint feels too tight.     · You have tingling, weakness, or numbness in your hand or fingers.    Watch closely for changes in your health, and be sure to contact your doctor if:    · You do not get better as expected.     · You have problems with your cast or splint. Where can you learn more? Go to http://stevo-tram.info/. Enter R106 in the search box to learn more about \"Broken Arm: Care Instructions. \"  Current as of: June 26, 2019  Content Version: 12.2  © 4673-6199 [employer]. Care instructions adapted under license by [employer] (which disclaims liability or warranty for this information). If you have questions about a medical condition or this instruction, always ask your healthcare professional. [place] [address] any warranty or liability for your use of this information.          Patient Education        Broken Elbow: Care Instructions  Your Care Instructions  A fractured elbow means that a bone has broken in or near the joint. Broken bones (fractures) can range from a small, hairline crack, to a bone or bones broken into two or more pieces. Your treatment depends on how bad the break is. Your doctor may have put your arm in a cast or splint to allow your elbow to heal or to keep it stable until you see another doctor. You also may wear a sling to help support your arm. It may take weeks or months for your elbow to heal. You can help it heal with some care at home. You heal best when you take good care of yourself. Eat a variety of healthy foods, and don't smoke. You may have had a sedative to help you relax. You may be unsteady after having sedation. It can take a few hours for the medicine's effects to wear off. Common side effects of sedation include nausea, vomiting, and feeling sleepy or tired. The doctor has checked you carefully, but problems can develop later. If you notice any problems or new symptoms, get medical treatment right away. Follow-up care is a key part of your treatment and safety. Be sure to make and go to all appointments, and call your doctor if you are having problems. It's also a good idea to know your test results and keep a list of the medicines you take. How can you care for yourself at home? · If the doctor gave you a sedative:  ? For 24 hours, don't do anything that requires attention to detail, such as going to work, making important decisions, or signing any legal documents. It takes time for the medicine's effects to completely wear off.  ? For your safety, do not drive or operate any machinery that could be dangerous. Wait until the medicine wears off and you can think clearly and react easily. · Put ice or a cold pack on your arm for 10 to 20 minutes at a time. Try to do this every 1 to 2 hours for the next 3 days (when you are awake).  Put a thin cloth between the ice and your cast or splint. Keep your cast or splint dry. · Follow the cast care instructions your doctor gives you. If you have a splint, do not take it off unless your doctor tells you to. · Be safe with medicines. Take pain medicines exactly as directed. ? If the doctor gave you a prescription medicine for pain, take it as prescribed. ? If you are not taking a prescription pain medicine, ask your doctor if you can take an over-the-counter medicine. · Prop up your arm on pillows when you sit or lie down in the first few days after the injury. Keep your elbow higher than the level of your heart. This will help reduce swelling. · Follow instructions for exercises to keep your arm strong. · Wiggle your fingers and wrist often to reduce swelling and stiffness. When should you call for help? Call 911 anytime you think you may need emergency care. For example, call if:    · You are very sleepy and you have trouble waking up.    Call your doctor now or seek immediate medical care if:    · You have new or worse nausea or vomiting.     · You have new or worse pain.     · Your hand or fingers are cool or pale or change color.     · Your cast or splint feels too tight.     · You have tingling, weakness, or numbness in your hand or fingers.    Watch closely for changes in your health, and be sure to contact your doctor if:    · You do not get better as expected.     · You have problems with your cast or splint. Where can you learn more? Go to http://stevo-tram.info/. Enter R335 in the search box to learn more about \"Broken Elbow: Care Instructions. \"  Current as of: June 26, 2019  Content Version: 12.2  © 2144-4818 [employer]. Care instructions adapted under license by [employer] (which disclaims liability or warranty for this information).  If you have questions about a medical condition or this instruction, always ask your healthcare professional. [place] any warranty or liability for your use of this information. Patient Education        Shoulder Pain: Care Instructions  Your Care Instructions    You can hurt your shoulder by using it too much during an activity, such as fishing or baseball. It can also happen as part of the everyday wear and tear of getting older. Shoulder injuries can be slow to heal, but your shoulder should get better with time. Your doctor may recommend a sling to rest your shoulder. If you have injured your shoulder, you may need testing and treatment. Follow-up care is a key part of your treatment and safety. Be sure to make and go to all appointments, and call your doctor if you are having problems. It's also a good idea to know your test results and keep a list of the medicines you take. How can you care for yourself at home? · Take pain medicines exactly as directed. ? If the doctor gave you a prescription medicine for pain, take it as prescribed. ? If you are not taking a prescription pain medicine, ask your doctor if you can take an over-the-counter medicine. ? Do not take two or more pain medicines at the same time unless the doctor told you to. Many pain medicines contain acetaminophen, which is Tylenol. Too much acetaminophen (Tylenol) can be harmful. · If your doctor recommends that you wear a sling, use it as directed. Do not take it off before your doctor tells you to. · Put ice or a cold pack on the sore area for 10 to 20 minutes at a time. Put a thin cloth between the ice and your skin. · If there is no swelling, you can put moist heat, a heating pad, or a warm cloth on your shoulder. Some doctors suggest alternating between hot and cold. · Rest your shoulder for a few days. If your doctor recommends it, you can then begin gentle exercise of the shoulder, but do not lift anything heavy. When should you call for help? Call 911 anytime you think you may need emergency care.  For example, call if:    · You have chest pain or pressure. This may occur with:  ? Sweating. ? Shortness of breath. ? Nausea or vomiting. ? Pain that spreads from the chest to the neck, jaw, or one or both shoulders or arms. ? Dizziness or lightheadedness. ? A fast or uneven pulse. After calling 911, chew 1 adult-strength aspirin. Wait for an ambulance. Do not try to drive yourself.     · Your arm or hand is cool or pale or changes color.    Call your doctor now or seek immediate medical care if:    · You have signs of infection, such as:  ? Increased pain, swelling, warmth, or redness in your shoulder. ? Red streaks leading from a place on your shoulder. ? Pus draining from an area of your shoulder. ? Swollen lymph nodes in your neck, armpits, or groin. ? A fever.    Watch closely for changes in your health, and be sure to contact your doctor if:    · You cannot use your shoulder.     · Your shoulder does not get better as expected. Where can you learn more? Go to http://stevo-tram.info/. Enter A957 in the search box to learn more about \"Shoulder Pain: Care Instructions. \"  Current as of: June 26, 2019  Content Version: 12.2  © 4045-6239 [employer]. Care instructions adapted under license by [employer] (which disclaims liability or warranty for this information). If you have questions about a medical condition or this instruction, always ask your healthcare professional. [place] [address] any warranty or liability for your use of this information. Patient Education        Shoulder Sprain: Care Instructions  Your Care Instructions    A shoulder sprain occurs when you stretch or tear a ligament in your shoulder. Ligaments are tough tissues that connect one bone to another. A sprain can happen during sports, a fall, or projects around the house. Shoulder sprains usually get better with treatment at home. Follow-up care is a key part of your treatment and safety.  Be sure to make and go to all appointments, and call your doctor if you are having problems. It's also a good idea to know your test results and keep a list of the medicines you take. How can you care for yourself at home? · Rest and protect your shoulder. Try to stop or reduce any action that causes pain. · If your doctor gave you a sling or immobilizer, wear it as directed. A sling or immobilizer supports your shoulder and may make you more comfortable. · Put ice or a cold pack on your shoulder for 10 to 20 minutes at a time. Try to do this every 1 to 2 hours for the next 3 days (when you are awake) or until the swelling goes down. Put a thin cloth between the ice and your skin. Some doctors suggest alternating between hot and cold. · Be safe with medicines. Read and follow all instructions on the label. ? If the doctor gave you a prescription medicine for pain, take it as prescribed. ? If you are not taking a prescription pain medicine, ask your doctor if you can take an over-the-counter medicine. · For the first day or two after an injury, avoid things that might increase swelling, such as hot showers, hot tubs, or hot packs. · After 2 or 3 days, if your swelling is gone, apply a heating pad set on low or a warm cloth to your shoulder. This helps keep your shoulder flexible. Some doctors suggest that you go back and forth between hot and cold. Put a thin cloth between the heating pad and your skin. · Follow your doctor's or physical therapist's directions for exercises. · Return to your usual level of activity slowly. When should you call for help?   Call your doctor now or seek immediate medical care if:    · Your pain is worse.     · You cannot move your shoulder.     · Your arm is cool or pale or changes color below the shoulder.     · You have tingling, weakness, or numbness in your arm.    Watch closely for changes in your health, and be sure to contact your doctor if:    · You do not get better as expected. Where can you learn more? Go to http://stevo-tram.info/. Enter Y775 in the search box to learn more about \"Shoulder Sprain: Care Instructions. \"  Current as of: June 26, 2019  Content Version: 12.2  © 3203-6850 [employer]. Care instructions adapted under license by [employer] (which disclaims liability or warranty for this information). If you have questions about a medical condition or this instruction, always ask your healthcare professional. [address] any warranty or liability for your use of this information.

## 2020-01-21 NOTE — ED TRIAGE NOTES
\"The joints in my right arm are hurting and swollen since Sunday. I hurt in the elbow area, mostly. The pain is severe. My hand and fingers are even swollen. \" Patient denies any chest pain or shortness of breath.
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
## 2020-02-26 DIAGNOSIS — M25.552 BILATERAL HIP PAIN: ICD-10-CM

## 2020-02-26 DIAGNOSIS — Z76.0 MEDICATION REFILL: ICD-10-CM

## 2020-02-26 DIAGNOSIS — M25.551 BILATERAL HIP PAIN: ICD-10-CM

## 2020-02-26 RX ORDER — TRAMADOL HYDROCHLORIDE 50 MG/1
50 TABLET ORAL
Qty: 28 TAB | Refills: 0 | Status: SHIPPED | OUTPATIENT
Start: 2020-02-26 | End: 2020-03-16 | Stop reason: SDUPTHER

## 2020-03-13 ENCOUNTER — PATIENT OUTREACH (OUTPATIENT)
Dept: CASE MANAGEMENT | Age: 65
End: 2020-03-13

## 2020-03-13 NOTE — PROGRESS NOTES
Complex Case Management      Date/Time:  3/13/2020 12:42 PM    Method of communication with patient:phone    [facility] contacted the patient by telephone to perform Ambulatory Care Coordination. Verified name and  (PHI) with patient as identifiers. Provided introduction to self, and explanation of the Ambulatory Care Manager's role. Patient declines AC.

## 2020-03-16 ENCOUNTER — HOSPITAL ENCOUNTER (OUTPATIENT)
Dept: LAB | Age: 65
Discharge: HOME | End: 2020-03-16
Payer: MEDICARE

## 2020-03-16 ENCOUNTER — OFFICE VISIT (OUTPATIENT)
Dept: INTERNAL MEDICINE CLINIC | Age: 65
End: 2020-03-16

## 2020-03-16 VITALS
BODY MASS INDEX: 25.69 KG/M2 | OXYGEN SATURATION: 95 % | TEMPERATURE: 98.4 F | SYSTOLIC BLOOD PRESSURE: 136 MMHG | DIASTOLIC BLOOD PRESSURE: 93 MMHG | HEIGHT: 63 IN | RESPIRATION RATE: 18 BRPM | HEART RATE: 76 BPM | WEIGHT: 145 LBS

## 2020-03-16 DIAGNOSIS — Z79.899 CONTROLLED SUBSTANCE AGREEMENT SIGNED: ICD-10-CM

## 2020-03-16 DIAGNOSIS — G89.29 CHRONIC LEFT-SIDED THORACIC BACK PAIN: ICD-10-CM

## 2020-03-16 DIAGNOSIS — M54.6 CHRONIC LEFT-SIDED THORACIC BACK PAIN: Primary | ICD-10-CM

## 2020-03-16 DIAGNOSIS — K59.00 CONSTIPATION, UNSPECIFIED CONSTIPATION TYPE: ICD-10-CM

## 2020-03-16 DIAGNOSIS — J02.9 ACUTE PHARYNGITIS, UNSPECIFIED ETIOLOGY: ICD-10-CM

## 2020-03-16 DIAGNOSIS — M54.6 CHRONIC LEFT-SIDED THORACIC BACK PAIN: ICD-10-CM

## 2020-03-16 DIAGNOSIS — G89.29 CHRONIC LEFT-SIDED THORACIC BACK PAIN: Primary | ICD-10-CM

## 2020-03-16 LAB
S PYO AG THROAT QL: NEGATIVE
VALID INTERNAL CONTROL?: YES

## 2020-03-16 PROCEDURE — G0480 DRUG TEST DEF 1-7 CLASSES: HCPCS

## 2020-03-16 RX ORDER — TRAMADOL HYDROCHLORIDE 50 MG/1
50 TABLET ORAL
Qty: 90 TAB | Refills: 0 | Status: SHIPPED | OUTPATIENT
Start: 2020-03-16 | End: 2020-04-14 | Stop reason: SDUPTHER

## 2020-03-16 RX ORDER — DOCUSATE SODIUM 100 MG/1
CAPSULE, LIQUID FILLED ORAL
Qty: 180 CAP | Refills: 3 | Status: SHIPPED | OUTPATIENT
Start: 2020-03-16 | End: 2021-03-15

## 2020-03-16 NOTE — PROGRESS NOTES
Rm: 11    Chief Complaint   Patient presents with    LOW BACK PAIN    Hip Pain    Sore Throat     Depression Screening:  3 most recent [facility] [place] Screens 3/16/2020 12/11/2019 12/11/2019 7/24/2018 2/15/2018 10/23/2017 5/11/2017   Little interest or pleasure in doing things Not at all Not at all Not at all Not at all Not at all Not at all Not at all   Feeling down, depressed, irritable, or hopeless Not at all Not at all Not at all Not at all Not at all Not at all Not at all   Total Score PHQ 2 0 0 0 0 0 0 0       Learning Assessment:  Learning Assessment 5/3/2016 6/11/2015 11/26/2014 6/19/2014 5/29/2014 4/28/2014   PRIMARY LEARNER Patient Patient Patient Patient Patient Patient   HIGHEST LEVEL OF EDUCATION - PRIMARY LEARNER  DID NOT GRADUATE HIGH SCHOOL DID NOT GRADUATE HIGH SCHOOL - - DID NOT GRADUATE HIGH SCHOOL DID NOT GRADUATE [facility] LEARNER NONE NONE - - - [address] CAREGIVER - - - - - No   PRIMARY [facility]   LEARNER PREFERENCE PRIMARY DEMONSTRATION DEMONSTRATION DEMONSTRATION DEMONSTRATION OTHER (COMMENT) READING   ANSWERED BY patient  Patient patient patient patient Patient   RELATIONSHIP SELF SELF SELF SELF SELF SELF       Abuse Screening:  Abuse Screening Questionnaire 2/15/2018 10/23/2017 6/11/2015   Do you ever feel afraid of your partner? N N N   Are you in a relationship with someone who physically or mentally threatens you? N N N   Is it safe for you to go home? Elaine Stapleton       Health Maintenance reviewed and discussed per provider: yes     Coordination of Care:    1. Have you been to the ER, urgent care clinic since your last visit? Hospitalized since your last visit? no    2. Have you seen or consulted any other health care providers outside of the [facility] since your last visit? Include any pap smears or colon screening.  no

## 2020-03-16 NOTE — PROGRESS NOTES
HISTORY OF PRESENT ILLNESS  Sang García is a 60 y.o. female. HPI  Ms. Sara Montemayor presents for follow-up chronic left thoracic back pain. Sang García RTC today to follow up on chronic pain diagnosis. We discussed her myalgias that is affecting her left thoracic back. Significant changes since last visit: also c/o right thoracic back pain and hip pain. She is  able to do her normal daily activities. She reports the following adverse side effects: constipation. She does state if she does not take her Tramadol, her back feels poorly and tingly. States she has taken Tramadol for 20+ years - 2 qam and 1-2 qpm.     Least pain over the last week has been 3/10. Worst pain over the last week has been 10/10. Opioid Risk Tool Reviewed: NO:     Aberrant behaviors: None and did not fill most recent 7-day Rx. Urine Drug Screen: due - order placed. Controlled substance agreement on file: YES.  reviewed:yes    Pill count is consistent with her prescription: yes    Concomitant use of a benzodiazepine: no    <50 MME/day      Also,  abstinence syndrome was reviewed and discussed with her today N/A    2) C/o sore throat - concerned about strep. Throat is better, fever has resolved, but still some occasional discomfort with swallowing. Review of Systems   Constitutional: Positive for fever (101). HENT: Positive for sore throat. Musculoskeletal: Positive for back pain. Visit Vitals  BP (!) 136/93 (BP 1 Location: Right arm, BP Patient Position: Sitting)   Pulse 76   Temp 98.4 °F (36.9 °C) (Oral)   Resp 18   Ht 5' 3\" (1.6 m)   Wt 145 lb (65.8 kg)   SpO2 95%   BMI 25.69 kg/m²       Physical Exam  Constitutional:       General: She is not in acute distress. Appearance: Normal appearance. She is well-developed. HENT:      Head: Normocephalic and atraumatic.       Right Ear: Tympanic membrane, ear canal and external ear normal.      Left Ear: Tympanic membrane, ear canal and external ear normal.      Nose: Nose normal.      Mouth/Throat:      Mouth: Mucous membranes are moist.      Pharynx: Uvula midline. No pharyngeal swelling, oropharyngeal exudate, posterior oropharyngeal erythema or uvula swelling. Tonsils: No tonsillar abscesses. Eyes:      General: No scleral icterus. Conjunctiva/sclera: Conjunctivae normal.      Pupils: Pupils are equal, round, and reactive to light. Neck:      Musculoskeletal: Neck supple. Cardiovascular:      Rate and Rhythm: Normal rate and regular rhythm. Pulses: Normal pulses. Dorsalis pedis pulses are 2+ on the right side and 2+ on the left side. Posterior tibial pulses are 2+ on the right side and 2+ on the left side. Heart sounds: Normal heart sounds. No murmur. No gallop. Pulmonary:      Effort: Pulmonary effort is normal. No respiratory distress. Breath sounds: Normal breath sounds. No decreased breath sounds, wheezing, rhonchi or rales. Musculoskeletal:      Thoracic back: She exhibits tenderness. Back:       Right lower leg: No edema. Left lower leg: No edema. Lymphadenopathy:      Head:      Right side of head: No submandibular or tonsillar adenopathy. Left side of head: No submandibular or tonsillar adenopathy. Cervical: No cervical adenopathy. Upper Body:      Right upper body: No supraclavicular adenopathy. Left upper body: No supraclavicular adenopathy. Skin:     General: Skin is warm and dry. Neurological:      Mental Status: She is alert and oriented to person, place, and time. Psychiatric:         Speech: Speech normal.       Results for orders placed or performed in visit on 03/16/20   AMB POC RAPID STREP A   Result Value Ref Range    VALID INTERNAL CONTROL POC Yes     Group A Strep Ag Negative Negative       ASSESSMENT and PLAN  Diagnoses and all orders for this visit:    1.  Chronic left-sided thoracic back pain  -     traMADoL (ULTRAM) 50 mg tablet; Take 1 Tab by mouth three (3) times daily as needed for Pain for up to 30 days. Max Daily Amount: 150 mg.   - Asked to step back to 3 total/day and more evenly-spaced dosing. Discussed risks of chronic Tramadol.    - Advised of PT with goal of improved pain and decreased Tramadol.   -     REFERRAL TO PHYSICAL THERAPY  -     TOXASSURE SELECT 13 (MW); Future    2. Controlled substance agreement signed  -     Natalya Singh 13 (MW); Future    3. Constipation, unspecified constipation type  -     docusate sodium (Col-Rite) 100 mg capsule; take 2 capsules by mouth every evening if needed for constipation    4. Acute pharyngitis, unspecified etiology  -     AMB POC RAPID STREP A  - Expect negative. Will call patient if positive. Symptoms primarily resolved. Follow-up and Dispositions    · Return in about 3 months (around 6/16/2020) for chronic pain.

## 2020-03-21 LAB — DRUGS UR: NORMAL

## 2020-03-23 DIAGNOSIS — K59.09 CHRONIC CONSTIPATION: ICD-10-CM

## 2020-03-23 RX ORDER — LINACLOTIDE 145 UG/1
CAPSULE, GELATIN COATED ORAL
Qty: 90 CAP | Refills: 1 | Status: SHIPPED | OUTPATIENT
Start: 2020-03-23 | End: 2020-07-29

## 2020-04-14 DIAGNOSIS — G89.29 CHRONIC LEFT-SIDED THORACIC BACK PAIN: ICD-10-CM

## 2020-04-14 DIAGNOSIS — M54.6 CHRONIC LEFT-SIDED THORACIC BACK PAIN: ICD-10-CM

## 2020-04-14 DIAGNOSIS — Z76.0 MEDICATION REFILL: ICD-10-CM

## 2020-04-14 DIAGNOSIS — K21.9 GASTROESOPHAGEAL REFLUX DISEASE WITHOUT ESOPHAGITIS: ICD-10-CM

## 2020-04-15 RX ORDER — TRAZODONE HYDROCHLORIDE 50 MG/1
TABLET ORAL
Qty: 60 TAB | Refills: 3 | Status: SHIPPED | OUTPATIENT
Start: 2020-04-15 | End: 2020-07-24

## 2020-04-15 RX ORDER — OMEPRAZOLE 20 MG/1
CAPSULE, DELAYED RELEASE ORAL
Qty: 90 CAP | Refills: 1 | Status: SHIPPED | OUTPATIENT
Start: 2020-04-15 | End: 2020-07-01

## 2020-04-15 RX ORDER — TRAMADOL HYDROCHLORIDE 50 MG/1
50 TABLET ORAL
Qty: 90 TAB | Refills: 0 | Status: SHIPPED | OUTPATIENT
Start: 2020-04-15 | End: 2020-05-19 | Stop reason: SDUPTHER

## 2020-05-06 DIAGNOSIS — J44.9 CHRONIC OBSTRUCTIVE PULMONARY DISEASE, UNSPECIFIED COPD TYPE (HCC): ICD-10-CM

## 2020-05-06 DIAGNOSIS — M62.838 MUSCLE SPASM: ICD-10-CM

## 2020-05-06 RX ORDER — BACLOFEN 10 MG/1
TABLET ORAL
Qty: 90 TAB | Refills: 3 | Status: SHIPPED | OUTPATIENT
Start: 2020-05-06 | End: 2020-08-05 | Stop reason: SDUPTHER

## 2020-05-19 DIAGNOSIS — G89.29 CHRONIC LEFT-SIDED THORACIC BACK PAIN: ICD-10-CM

## 2020-05-19 DIAGNOSIS — M54.6 CHRONIC LEFT-SIDED THORACIC BACK PAIN: ICD-10-CM

## 2020-05-19 NOTE — TELEPHONE ENCOUNTER
Patient request for refill. Last OV 3/2020. Requested Prescriptions     Pending Prescriptions Disp Refills    traMADoL (ULTRAM) 50 mg tablet 90 Tab 0     Sig: Take 1 Tab by mouth three (3) times daily as needed for Pain for up to 30 days. Max Daily Amount: 150 mg.

## 2020-05-20 RX ORDER — TRAMADOL HYDROCHLORIDE 50 MG/1
50 TABLET ORAL
Qty: 90 TAB | Refills: 0 | Status: SHIPPED | OUTPATIENT
Start: 2020-05-20 | End: 2020-06-17 | Stop reason: SDUPTHER

## 2020-06-01 RX ORDER — SIMVASTATIN 10 MG/1
TABLET, FILM COATED ORAL
Qty: 90 TAB | Refills: 1 | Status: SHIPPED | OUTPATIENT
Start: 2020-06-01 | End: 2020-09-23

## 2020-06-01 RX ORDER — BUDESONIDE AND FORMOTEROL FUMARATE DIHYDRATE 160; 4.5 UG/1; UG/1
AEROSOL RESPIRATORY (INHALATION)
Qty: 3 INHALER | Refills: 1 | Status: SHIPPED | OUTPATIENT
Start: 2020-06-01 | End: 2020-10-21

## 2020-06-01 NOTE — TELEPHONE ENCOUNTER
Patient is calling to check the status of this refill request.  Last OV 3/20/20.     Requested Prescriptions     Pending Prescriptions Disp Refills    simvastatin (ZOCOR) 10 mg tablet [Pharmacy Med Name: SIMVASTATIN 10 MG TABLET] 90 Tab 3     Sig: take 1 tablet by mouth every evening    Symbicort 160-4.5 mcg/actuation HFAA [Pharmacy Med Name: Centrahoma Jerry 160-4.5 MCG INHALER]       Sig: inhale 2 puffs by mouth twice a day

## 2020-06-17 DIAGNOSIS — G89.29 CHRONIC LEFT-SIDED THORACIC BACK PAIN: ICD-10-CM

## 2020-06-17 DIAGNOSIS — M54.6 CHRONIC LEFT-SIDED THORACIC BACK PAIN: ICD-10-CM

## 2020-06-17 RX ORDER — TRAMADOL HYDROCHLORIDE 50 MG/1
50 TABLET ORAL
Qty: 90 TAB | Refills: 0 | Status: SHIPPED | OUTPATIENT
Start: 2020-06-17 | End: 2020-07-20 | Stop reason: SDUPTHER

## 2020-06-22 DIAGNOSIS — Z76.0 MEDICATION REFILL: ICD-10-CM

## 2020-06-22 RX ORDER — GUAIFENESIN 100 MG/5ML
LIQUID (ML) ORAL
Qty: 90 TAB | Refills: 3 | Status: SHIPPED | OUTPATIENT
Start: 2020-06-22 | End: 2021-02-22

## 2020-06-22 NOTE — TELEPHONE ENCOUNTER
Pharmacy fax request for refill. Last OV 3/16/20, no future appts scheduled.     Requested Prescriptions     Pending Prescriptions Disp Refills    aspirin 81 mg chewable tablet 90 Tab 3

## 2020-07-01 DIAGNOSIS — Z76.0 MEDICATION REFILL: ICD-10-CM

## 2020-07-01 DIAGNOSIS — K21.9 GASTROESOPHAGEAL REFLUX DISEASE WITHOUT ESOPHAGITIS: ICD-10-CM

## 2020-07-01 RX ORDER — OMEPRAZOLE 20 MG/1
CAPSULE, DELAYED RELEASE ORAL
Qty: 90 CAP | Refills: 1 | Status: SHIPPED | OUTPATIENT
Start: 2020-07-01 | End: 2020-12-02

## 2020-07-20 DIAGNOSIS — G89.29 CHRONIC LEFT-SIDED THORACIC BACK PAIN: ICD-10-CM

## 2020-07-20 DIAGNOSIS — M54.6 CHRONIC LEFT-SIDED THORACIC BACK PAIN: ICD-10-CM

## 2020-07-22 RX ORDER — TRAMADOL HYDROCHLORIDE 50 MG/1
50 TABLET ORAL
Qty: 90 TAB | Refills: 0 | Status: SHIPPED | OUTPATIENT
Start: 2020-07-22 | End: 2020-08-18 | Stop reason: SDUPTHER

## 2020-07-23 DIAGNOSIS — Z76.0 MEDICATION REFILL: ICD-10-CM

## 2020-07-24 RX ORDER — TRAZODONE HYDROCHLORIDE 50 MG/1
TABLET ORAL
Qty: 60 TAB | Refills: 3 | Status: SHIPPED | OUTPATIENT
Start: 2020-07-24 | End: 2020-09-23

## 2020-07-24 RX ORDER — MONTELUKAST SODIUM 10 MG/1
TABLET ORAL
Qty: 90 TAB | Refills: 3 | Status: SHIPPED | OUTPATIENT
Start: 2020-07-24 | End: 2021-07-20

## 2020-07-24 NOTE — TELEPHONE ENCOUNTER
Pharmacy fax request for refill.   Last OV 3/6/20, next scheduled 8/5/20    Requested Prescriptions     Pending Prescriptions Disp Refills    montelukast (SINGULAIR) 10 mg tablet 90 Tab 3

## 2020-08-05 ENCOUNTER — VIRTUAL VISIT (OUTPATIENT)
Dept: INTERNAL MEDICINE CLINIC | Age: 65
End: 2020-08-05

## 2020-08-05 DIAGNOSIS — M54.6 CHRONIC LEFT-SIDED THORACIC BACK PAIN: Primary | ICD-10-CM

## 2020-08-05 DIAGNOSIS — G89.29 CHRONIC LEFT-SIDED THORACIC BACK PAIN: Primary | ICD-10-CM

## 2020-08-05 DIAGNOSIS — J44.9 CHRONIC OBSTRUCTIVE PULMONARY DISEASE, UNSPECIFIED COPD TYPE (HCC): ICD-10-CM

## 2020-08-05 DIAGNOSIS — Z79.899 CONTROLLED SUBSTANCE AGREEMENT SIGNED: ICD-10-CM

## 2020-08-05 DIAGNOSIS — M54.50 CHRONIC BILATERAL LOW BACK PAIN WITHOUT SCIATICA: ICD-10-CM

## 2020-08-05 DIAGNOSIS — M62.838 MUSCLE SPASM: ICD-10-CM

## 2020-08-05 DIAGNOSIS — G89.29 CHRONIC BILATERAL LOW BACK PAIN WITHOUT SCIATICA: ICD-10-CM

## 2020-08-05 DIAGNOSIS — Z12.11 COLON CANCER SCREENING: ICD-10-CM

## 2020-08-05 DIAGNOSIS — M25.50 POLYARTHRALGIA: ICD-10-CM

## 2020-08-05 RX ORDER — BACLOFEN 10 MG/1
TABLET ORAL
Qty: 90 TAB | Refills: 3 | Status: SHIPPED | OUTPATIENT
Start: 2020-08-05 | End: 2020-12-20

## 2020-08-05 RX ORDER — DICLOFENAC SODIUM 10 MG/G
2-4 GEL TOPICAL 4 TIMES DAILY
Qty: 500 G | Refills: 3 | Status: SHIPPED | OUTPATIENT
Start: 2020-08-05 | End: 2022-05-12

## 2020-08-05 RX ORDER — ALBUTEROL SULFATE 0.83 MG/ML
SOLUTION RESPIRATORY (INHALATION)
Qty: 75 ML | Refills: 3 | Status: SHIPPED | OUTPATIENT
Start: 2020-08-05 | End: 2022-03-15 | Stop reason: SDUPTHER

## 2020-08-05 RX ORDER — ASPIRIN/CAFFEINE 500-32.5MG
TABLET ORAL
COMMUNITY
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
## 2020-08-05 NOTE — PATIENT INSTRUCTIONS
1) Pulmonology - I am referring you to a lung specialist at [facility] - Dr. Luiz Cheatham. 2) When you are at [facility], go to the lab to complete your urine study and obtain what you need for the stool test.  
 
3) At [facility], get your lower back x-rays done. I have placed the orders for these. 4) For your lower back pain, use the Diclofenac gel instead of the Omkar Back and body. You can also use the Baclofen for muscle spasms. 5) You can also use the Diclofenac gel on your shoulder and your other joint pains. 6) For your COPD, use the Symbicort and the Spiriva. I am ordering new supplies for your nebulizer machine. Then follow-up with the lung specialist. He may change your inhalers.

## 2020-08-05 NOTE — PROGRESS NOTES
Faxed order for Nebulizer ( mask and tubing) to [facility]     Fax [phone]  Phone [phone]    Confirmation received.

## 2020-08-18 DIAGNOSIS — G89.29 CHRONIC LEFT-SIDED THORACIC BACK PAIN: ICD-10-CM

## 2020-08-18 DIAGNOSIS — M54.6 CHRONIC LEFT-SIDED THORACIC BACK PAIN: ICD-10-CM

## 2020-08-19 RX ORDER — TRAMADOL HYDROCHLORIDE 50 MG/1
50 TABLET ORAL
Qty: 90 TAB | Refills: 0 | Status: SHIPPED | OUTPATIENT
Start: 2020-08-19 | End: 2020-09-18 | Stop reason: SDUPTHER

## 2020-09-18 DIAGNOSIS — G89.29 CHRONIC LEFT-SIDED THORACIC BACK PAIN: ICD-10-CM

## 2020-09-18 DIAGNOSIS — M54.6 CHRONIC LEFT-SIDED THORACIC BACK PAIN: ICD-10-CM

## 2020-09-18 RX ORDER — TRAMADOL HYDROCHLORIDE 50 MG/1
50 TABLET ORAL
Qty: 90 TAB | Refills: 0 | Status: SHIPPED | OUTPATIENT
Start: 2020-09-18 | End: 2020-10-15 | Stop reason: SDUPTHER

## 2020-10-15 DIAGNOSIS — M54.6 CHRONIC LEFT-SIDED THORACIC BACK PAIN: ICD-10-CM

## 2020-10-15 DIAGNOSIS — G89.29 CHRONIC LEFT-SIDED THORACIC BACK PAIN: ICD-10-CM

## 2020-10-16 RX ORDER — TRAMADOL HYDROCHLORIDE 50 MG/1
50 TABLET ORAL
Qty: 90 TAB | Refills: 0 | Status: SHIPPED | OUTPATIENT
Start: 2020-10-16 | End: 2020-11-17 | Stop reason: SDUPTHER

## 2020-10-21 DIAGNOSIS — J44.9 CHRONIC OBSTRUCTIVE PULMONARY DISEASE, UNSPECIFIED COPD TYPE (HCC): ICD-10-CM

## 2020-10-21 RX ORDER — BUDESONIDE AND FORMOTEROL FUMARATE DIHYDRATE 160; 4.5 UG/1; UG/1
AEROSOL RESPIRATORY (INHALATION)
Qty: 3 INHALER | Refills: 2 | Status: SHIPPED | OUTPATIENT
Start: 2020-10-21 | End: 2022-01-12

## 2020-10-24 DIAGNOSIS — J44.9 CHRONIC OBSTRUCTIVE PULMONARY DISEASE, UNSPECIFIED COPD TYPE (HCC): ICD-10-CM

## 2020-12-01 DIAGNOSIS — Z76.0 MEDICATION REFILL: ICD-10-CM

## 2020-12-01 DIAGNOSIS — K21.9 GASTROESOPHAGEAL REFLUX DISEASE WITHOUT ESOPHAGITIS: ICD-10-CM

## 2020-12-02 RX ORDER — OMEPRAZOLE 20 MG/1
CAPSULE, DELAYED RELEASE ORAL
Qty: 90 CAP | Refills: 1 | Status: SHIPPED | OUTPATIENT
Start: 2020-12-02 | End: 2021-07-17

## 2020-12-06 RX ORDER — SIMVASTATIN 10 MG/1
TABLET, FILM COATED ORAL
Qty: 90 TAB | Refills: 1 | Status: SHIPPED | OUTPATIENT
Start: 2020-12-06 | End: 2021-02-22

## 2020-12-09 DIAGNOSIS — M54.6 CHRONIC LEFT-SIDED THORACIC BACK PAIN: ICD-10-CM

## 2020-12-09 DIAGNOSIS — G89.29 CHRONIC LEFT-SIDED THORACIC BACK PAIN: ICD-10-CM

## 2020-12-14 RX ORDER — TRAMADOL HYDROCHLORIDE 50 MG/1
50 TABLET ORAL
Qty: 90 TAB | Refills: 0 | Status: SHIPPED | OUTPATIENT
Start: 2020-12-14 | End: 2021-01-15 | Stop reason: SDUPTHER

## 2020-12-18 ENCOUNTER — HOSPITAL ENCOUNTER (OUTPATIENT)
Dept: LAB | Age: 65
Discharge: HOME | End: 2020-12-18

## 2020-12-18 ENCOUNTER — OFFICE VISIT (OUTPATIENT)
Dept: INTERNAL MEDICINE CLINIC | Age: 65
End: 2020-12-18
Payer: MEDICARE

## 2020-12-18 ENCOUNTER — HOSPITAL ENCOUNTER (OUTPATIENT)
Dept: LAB | Age: 65
Discharge: HOME | End: 2020-12-18
Payer: MEDICARE

## 2020-12-18 VITALS
SYSTOLIC BLOOD PRESSURE: 126 MMHG | TEMPERATURE: 97.2 F | HEIGHT: 63 IN | HEART RATE: 75 BPM | OXYGEN SATURATION: 95 % | RESPIRATION RATE: 20 BRPM | BODY MASS INDEX: 25.45 KG/M2 | DIASTOLIC BLOOD PRESSURE: 84 MMHG | WEIGHT: 143.6 LBS

## 2020-12-18 DIAGNOSIS — E78.5 HYPERLIPIDEMIA, UNSPECIFIED HYPERLIPIDEMIA TYPE: ICD-10-CM

## 2020-12-18 DIAGNOSIS — M77.11 LATERAL EPICONDYLITIS OF BOTH ELBOWS: ICD-10-CM

## 2020-12-18 DIAGNOSIS — J44.9 CHRONIC OBSTRUCTIVE PULMONARY DISEASE, UNSPECIFIED COPD TYPE (HCC): ICD-10-CM

## 2020-12-18 DIAGNOSIS — G89.29 CHRONIC LEFT-SIDED THORACIC BACK PAIN: Primary | ICD-10-CM

## 2020-12-18 DIAGNOSIS — G89.29 CHRONIC LEFT-SIDED THORACIC BACK PAIN: ICD-10-CM

## 2020-12-18 DIAGNOSIS — Z79.899 CONTROLLED SUBSTANCE AGREEMENT SIGNED: ICD-10-CM

## 2020-12-18 DIAGNOSIS — M54.6 CHRONIC LEFT-SIDED THORACIC BACK PAIN: Primary | ICD-10-CM

## 2020-12-18 DIAGNOSIS — R73.03 PREDIABETES: ICD-10-CM

## 2020-12-18 DIAGNOSIS — M77.12 LATERAL EPICONDYLITIS OF BOTH ELBOWS: ICD-10-CM

## 2020-12-18 DIAGNOSIS — R07.9 INTERMITTENT CHEST PAIN: ICD-10-CM

## 2020-12-18 DIAGNOSIS — J30.9 ALLERGIC RHINITIS, UNSPECIFIED SEASONALITY, UNSPECIFIED TRIGGER: ICD-10-CM

## 2020-12-18 DIAGNOSIS — M54.6 CHRONIC LEFT-SIDED THORACIC BACK PAIN: ICD-10-CM

## 2020-12-18 LAB
ALBUMIN SERPL-MCNC: 3.8 G/DL (ref 3.4–5)
ALBUMIN/GLOB SERPL: 1.2 {RATIO} (ref 0.8–1.7)
ALP SERPL-CCNC: 74 U/L (ref 45–117)
ALT SERPL-CCNC: 21 U/L (ref 13–56)
ANION GAP SERPL CALC-SCNC: 6 MMOL/L (ref 3–18)
AST SERPL-CCNC: 20 U/L (ref 10–38)
BILIRUB SERPL-MCNC: 0.5 MG/DL (ref 0.2–1)
BUN SERPL-MCNC: 11 MG/DL (ref 7–18)
BUN/CREAT SERPL: 12 (ref 12–20)
CALCIUM SERPL-MCNC: 9.1 MG/DL (ref 8.5–10.1)
CHLORIDE SERPL-SCNC: 110 MMOL/L (ref 100–111)
CHOLEST SERPL-MCNC: 171 MG/DL
CO2 SERPL-SCNC: 26 MMOL/L (ref 21–32)
CREAT SERPL-MCNC: 0.89 MG/DL (ref 0.6–1.3)
ERYTHROCYTE [DISTWIDTH] IN BLOOD BY AUTOMATED COUNT: 14.9 % (ref 11.6–14.5)
GLOBULIN SER CALC-MCNC: 3.2 G/DL (ref 2–4)
GLUCOSE SERPL-MCNC: 75 MG/DL (ref 74–99)
HBA1C MFR BLD: 5.3 % (ref 4.2–5.6)
HCT VFR BLD AUTO: 42.8 % (ref 35–45)
HDLC SERPL-MCNC: 84 MG/DL (ref 40–60)
HDLC SERPL: 2 {RATIO} (ref 0–5)
HGB BLD-MCNC: 13.9 G/DL (ref 12–16)
LDLC SERPL CALC-MCNC: 74 MG/DL (ref 0–100)
LIPID PROFILE,FLP: ABNORMAL
MCH RBC QN AUTO: 28.8 PG (ref 24–34)
MCHC RBC AUTO-ENTMCNC: 32.5 G/DL (ref 31–37)
MCV RBC AUTO: 88.8 FL (ref 74–97)
PLATELET # BLD AUTO: 230 K/UL (ref 135–420)
PMV BLD AUTO: 10 FL (ref 9.2–11.8)
POTASSIUM SERPL-SCNC: 4.5 MMOL/L (ref 3.5–5.5)
PROT SERPL-MCNC: 7 G/DL (ref 6.4–8.2)
RBC # BLD AUTO: 4.82 M/UL (ref 4.2–5.3)
SODIUM SERPL-SCNC: 142 MMOL/L (ref 136–145)
TRIGL SERPL-MCNC: 65 MG/DL (ref ?–150)
VLDLC SERPL CALC-MCNC: 13 MG/DL
WBC # BLD AUTO: 7.9 K/UL (ref 4.6–13.2)

## 2020-12-18 PROCEDURE — 80053 COMPREHEN METABOLIC PANEL: CPT

## 2020-12-18 PROCEDURE — G8432 DEP SCR NOT DOC, RNG: HCPCS | Performed by: PHYSICIAN ASSISTANT

## 2020-12-18 PROCEDURE — G8427 DOCREV CUR MEDS BY ELIG CLIN: HCPCS | Performed by: PHYSICIAN ASSISTANT

## 2020-12-18 PROCEDURE — 83036 HEMOGLOBIN GLYCOSYLATED A1C: CPT

## 2020-12-18 PROCEDURE — 99214 OFFICE O/P EST MOD 30 MIN: CPT | Performed by: PHYSICIAN ASSISTANT

## 2020-12-18 PROCEDURE — G0480 DRUG TEST DEF 1-7 CLASSES: HCPCS

## 2020-12-18 PROCEDURE — 3017F COLORECTAL CA SCREEN DOC REV: CPT | Performed by: PHYSICIAN ASSISTANT

## 2020-12-18 PROCEDURE — G8419 CALC BMI OUT NRM PARAM NOF/U: HCPCS | Performed by: PHYSICIAN ASSISTANT

## 2020-12-18 PROCEDURE — 80061 LIPID PANEL: CPT

## 2020-12-18 PROCEDURE — 85027 COMPLETE CBC AUTOMATED: CPT

## 2020-12-18 RX ORDER — FLUTICASONE PROPIONATE 50 MCG
1 SPRAY, SUSPENSION (ML) NASAL DAILY
Qty: 1 BOTTLE | Refills: 5 | Status: SHIPPED | OUTPATIENT
Start: 2020-12-18

## 2020-12-18 RX ORDER — NAPROXEN 500 MG/1
500 TABLET ORAL 2 TIMES DAILY WITH MEALS
Qty: 60 TAB | Refills: 0 | Status: SHIPPED | OUTPATIENT
Start: 2020-12-18 | End: 2021-07-21

## 2020-12-18 NOTE — PATIENT INSTRUCTIONS
Dr. Sina Clemente - pulmonologist 
 
[phone] Tennis Elbow: Care Instructions Your Care Instructions Tennis elbow is soreness or pain on the outer part of the elbow. The pain occurs when the tendon is stretched and becomes irritated by repeated twisting of the hand, wrist, and forearm. A tendon is a tough tissue that connects muscle to bone. This injury is common in tennis players. But you also can get it from many activities that work the same muscles. Examples include gardening, painting, and using tools. Tennis elbow usually heals with rest and treatment at home. Follow-up care is a key part of your treatment and safety. Be sure to make and go to all appointments, and call your doctor if you are having problems. It's also a good idea to know your test results and keep a list of the medicines you take. How can you care for yourself at home? 
  · Rest your fingers, wrist, and forearm. Try to stop or reduce any activity that causes elbow pain. You may have to rest your arm for weeks to months. Follow your doctor's directions for how long to rest.  
  · Put ice or a cold pack on your elbow for 10 to 20 minutes at a time. Try to do this every 1 to 2 hours for the next 3 days (when you are awake) or until the swelling goes down. Put a thin cloth between the ice and your skin. You can try heat, or alternating heat and ice, after the first 3 days.  
  · If your doctor gave you a brace or splint, use it as directed. A \"counterforce\" brace is a strap around your forearm, just below your elbow. It may ease the pressure on the tendon and spread force throughout your arm.  
  · Prop up your elbow on pillows to help reduce swelling.  
  · Follow your doctor's or physical therapist's directions for exercise.  
  · Return to your usual activities slowly.   · Try to prevent the problem. Learn the best techniques for your sport. For example, make sure the  on your tennis racquet is not too big for your hand. Try not to hit a tennis ball late in your swing.  
  · Think about asking your employer about new ways of doing your job if your elbow pain is caused by something you do at work. Medicines 
  · Be safe with medicines. Read and follow all instructions on the label. ? If the doctor gave you a prescription medicine for pain, take it as prescribed. ? If you are not taking a prescription pain medicine, ask your doctor if you can take an over-the-counter medicine. When should you call for help? Call your doctor now or seek immediate medical care if: 
  · Your pain is worse.  
  · You cannot bend your elbow normally.  
  · Your arm or hand is cool or pale or changes color.  
  · You have tingling, weakness, or numbness in your hand and fingers. Watch closely for changes in your health, and be sure to contact your doctor if: 
  · You have work problems caused by your elbow pain.  
  · Your pain is not better after 2 weeks. Where can you learn more? Go to http://www.gray.com/ Enter [phone] in the search box to learn more about \"Tennis Elbow: Care Instructions. \" Current as of: March 2, 2020               Content Version: 12.6 © 0013-4817 [employer]. Care instructions adapted under license by [employer] (which disclaims liability or warranty for this information). If you have questions about a medical condition or this instruction, always ask your healthcare professional. [place] [address] any warranty or liability for your use of this information. Tennis Elbow: Exercises Introduction Here are some examples of exercises for you to try. The exercises may be suggested for a condition or for rehabilitation. Start each exercise slowly. Ease off the exercises if you start to have pain. You will be told when to start these exercises and which ones will work best for you. How to do the exercises Wrist flexor stretch 1. Extend your arm in front of you with your palm up. 2. Bend your wrist, pointing your hand toward the floor. 3. With your other hand, gently bend your wrist farther until you feel a mild to moderate stretch in your forearm. 4. Hold for at least 15 to 30 seconds. Repeat 2 to 4 times. Wrist extensor stretch 1. Repeat steps 1 to 4 of the stretch above but begin with your extended hand palm down. Ball or sock squeeze 1. Hold a tennis ball (or a rolled-up sock) in your hand. 2. Make a fist around the ball (or sock) and squeeze. 3. Hold for about 6 seconds, and then relax for up to 10 seconds. 4. Repeat 8 to 12 times. 5. Switch the ball (or sock) to your other hand and do 8 to 12 times. Wrist deviation 1. Sit so that your arm is supported but your hand hangs off the edge of a flat surface, such as a table. 2. Hold your hand out like you are shaking hands with someone. 3. Move your hand up and down. 4. Repeat this motion 8 to 12 times. 5. Switch arms. 6. Try to do this exercise twice with each hand. Wrist curls 1. Place your forearm on a table with your hand hanging over the edge of the table, palm up. 2. Place a 1- to 2-pound weight in your hand. This may be a dumbbell, a can of food, or a filled water bottle. 3. Slowly raise and lower the weight while keeping your forearm on the table and your palm facing up. 4. Repeat this motion 8 to 12 times. 5. Switch arms, and do steps 1 through 4. 
6. Repeat with your hand facing down toward the floor. Switch arms. Biceps curls 1. Sit leaning forward with your legs slightly spread and your left hand on your left thigh. 2. Place your right elbow on your right thigh, and hold the weight with your forearm horizontal. 
3. Slowly curl the weight up and toward your chest. 
4. Repeat this motion 8 to 12 times. 5. Switch arms, and do steps 1 through 4. Follow-up care is a key part of your treatment and safety. Be sure to make and go to all appointments, and call your doctor if you are having problems. It's also a good idea to know your test results and keep a list of the medicines you take. Where can you learn more? Go to http://www.gray.com/ Enter L203 in the search box to learn more about \"Tennis Elbow: Exercises. \" Current as of: March 2, 2020               Content Version: 12.6 © 7329-8129 [employer]. Care instructions adapted under license by [employer] (which disclaims liability or warranty for this information). If you have questions about a medical condition or this instruction, always ask your healthcare professional. [address] any warranty or liability for your use of this information.

## 2020-12-18 NOTE — PROGRESS NOTES
Carrillo Phoenix is a 63 y.o. female (: 1955) presenting to address:    Chief Complaint   Patient presents with    Medication Evaluation       Vitals:    20 1301   BP: 126/84   Pulse: 75   Resp: 20   Temp: 97.2 °F (36.2 °C)   TempSrc: Oral   SpO2: 95%   Weight: 143 lb 9.6 oz (65.1 kg)   Height: 5' 3\" (1.6 m)   PainSc:   0 - No pain       Hearing/Vision:   No exam data present    Learning Assessment:     Learning Assessment 5/3/2016   PRIMARY LEARNER Patient   HIGHEST LEVEL OF EDUCATION - PRIMARY LEARNER  DID NOT GRADUATE HIGH SCHOOL   BARRIERS PRIMARY LEARNER NONE   CO-LEARNER CAREGIVER -   PRIMARY LANGUAGE ENGLISH   LEARNER PREFERENCE PRIMARY DEMONSTRATION   ANSWERED BY patient    RELATIONSHIP SELF     Depression Screening:     3 most recent PHQ Screens 2020   Little interest or pleasure in doing things Not at all   Feeling down, depressed, irritable, or hopeless Not at all   Total Score PHQ 2 0     Fall Risk Assessment:     Fall Risk Assessment, last 12 mths 2018   Able to walk? Yes   Fall in past 12 months? No     Abuse Screening:     Abuse Screening Questionnaire 2/15/2018   Do you ever feel afraid of your partner? N   Are you in a relationship with someone who physically or mentally threatens you? N   Is it safe for you to go home? Y     Coordination of Care Questionaire:   1. Have you been to the ER, urgent care clinic since your last visit? Hospitalized since your last visit? NO    2. Have you seen or consulted any other health care providers outside of the [address] [place] [address] since your last visit? Include any pap smears or colon screening. NO    Advanced Directive:   1. Do you have an Advanced Directive? NO    2. Would you like information on Advanced Directives?  NO

## 2020-12-18 NOTE — PROGRESS NOTES
HISTORY OF PRESENT ILLNESS  Octaviano Zuñiga is a 63 y.o. female. HPI  Routine f/u.     1) Chronic left-sided thoracic back pain - c/o wanting to resume prior dosage of 2 tabs qam and 2 tabs qpm. C/o running low on medication too soon. C/o withdrawal symptoms, but this change to her medication was made 1/2020. Denies back spasms as of late, but reports Baclofen helped when these occurred. Also c/o kush elbow pain for which she was increasing her Tramadol. C/o lateral aspect of kush elbows. Tried Diclofenac gel but denies improvement. Took OTC Aleve and states this helped more significantly. 2) COPD - only using Symbicort once daily, 2 puffs. Taking daily Spiriva. - Using ProAir more frequently. Admits to regular cough. - Did not consult with pulmonology - admits does not answer phone unless she recognizes the number. No recent PFT. C/o chest pain when she coughs. This has been chronic and intermittent x months. She is a chronic smoker. 3) C/o nasal burning, sneezing, and post-nasal drip. 4) Chronic LBP - did not complete x-rays but agreeable to completion today. Review of Systems   Respiratory: Positive for cough. Musculoskeletal: Positive for back pain and joint pain (kush elbow). Visit Vitals  /84 (BP 1 Location: Right arm, BP Patient Position: Sitting)   Pulse 75   Temp 97.2 °F (36.2 °C) (Oral)   Resp 20   Ht 5' 3\" (1.6 m)   Wt 143 lb 9.6 oz (65.1 kg)   SpO2 95%   BMI 25.44 kg/m²       Physical Exam  Constitutional:       General: She is not in acute distress. Appearance: Normal appearance. She is well-developed. HENT:      Head: Normocephalic and atraumatic. Right Ear: Tympanic membrane, ear canal and external ear normal.      Left Ear: Tympanic membrane, ear canal and external ear normal.      Nose: Nose normal.      Mouth/Throat:      Comments: Mask  Eyes:      General: No scleral icterus.      Conjunctiva/sclera: Conjunctivae normal.      Pupils: Pupils are equal, round, and reactive to light. Neck:      Musculoskeletal: Neck supple. Cardiovascular:      Rate and Rhythm: Normal rate and regular rhythm. Pulses: Normal pulses. Dorsalis pedis pulses are 2+ on the right side and 2+ on the left side. Posterior tibial pulses are 2+ on the right side and 2+ on the left side. Heart sounds: Normal heart sounds. No murmur. No gallop. Pulmonary:      Effort: Pulmonary effort is normal. No respiratory distress. Breath sounds: Normal breath sounds. No decreased breath sounds, wheezing, rhonchi or rales. Musculoskeletal:      Right elbow: She exhibits normal range of motion. Tenderness found. Lateral epicondyle tenderness noted. Left elbow: She exhibits normal range of motion. Tenderness found. Lateral epicondyle tenderness noted. Thoracic back: She exhibits no tenderness and no bony tenderness. Lumbar back: She exhibits no tenderness and no bony tenderness. Right lower leg: No edema. Left lower leg: No edema. Comments: Pain at kush lateral epicondyles with kush middle finger extension. Lymphadenopathy:      Head:      Right side of head: No submandibular or tonsillar adenopathy. Left side of head: No submandibular or tonsillar adenopathy. Cervical: No cervical adenopathy. Upper Body:      Right upper body: No supraclavicular adenopathy. Left upper body: No supraclavicular adenopathy. Skin:     General: Skin is warm and dry. Neurological:      Mental Status: She is alert and oriented to person, place, and time. Psychiatric:         Speech: Speech normal.         ASSESSMENT and PLAN  Diagnoses and all orders for this visit:    1. Chronic left-sided thoracic back pain  2. Controlled substance agreement signed  -  reviewed. - Today complete previously ordered UDS. - She wishes to resume prior dosage, but I advise against this. Recommend 1 tab TID. Avoid 2 tabs at 1 time.  Do not use Tramadol to treat other pain etiologies. Needs to come in and be seen/evaluated as we do not treat epicondylitis with chronic opioids. We have better options. 3. Chronic obstructive pulmonary disease, unspecified COPD type ([facility] [address]  -     PULMONARY FUNCTION TEST; Future  -     AMB SUPPLY ORDER   - Neb supplies.   - Info given to schedule consultation with Dr. Caridad Celeste of pulmonology. Advise if seen, will do PFT with him and can cancel PFT ordered today. - Increase Symbicort to 2 puffs BID. This will help with improved control of symptomatology. 4. Intermittent chest pain  -     XR CHEST PA LAT; Future    5. Hyperlipidemia, unspecified hyperlipidemia type  -     LIPID PANEL; Future    6. Prediabetes  -     CBC W/O DIFF; Future  -     METABOLIC PANEL, COMPREHENSIVE; Future  -     HEMOGLOBIN A1C W/O EAG; Future    7. Lateral epicondylitis of both elbows  -     naproxen (NAPROSYN) 500 mg tablet; Take 1 Tab by mouth two (2) times daily (with meals). - Patient states she tolerates OTC Aleve. - Advised of tennis elbow strap, exercises. Info and exercises printed for patient. 8. Allergic rhinitis, unspecified seasonality, unspecified trigger  -     fluticasone propionate (FLONASE) 50 mcg/actuation nasal spray; 1 Cassandra by Both Nostrils route daily. Follow-up and Dispositions    · Return for Schedule Medicare Wellness Visit.

## 2020-12-20 DIAGNOSIS — M62.838 MUSCLE SPASM: ICD-10-CM

## 2020-12-20 RX ORDER — BACLOFEN 10 MG/1
TABLET ORAL
Qty: 90 TAB | Refills: 3 | Status: SHIPPED | OUTPATIENT
Start: 2020-12-20 | End: 2021-11-23

## 2020-12-21 ENCOUNTER — DOCUMENTATION ONLY (OUTPATIENT)
Dept: INTERNAL MEDICINE CLINIC | Age: 65
End: 2020-12-21

## 2020-12-23 LAB — DRUGS UR: NORMAL

## 2021-01-15 DIAGNOSIS — G89.29 CHRONIC LEFT-SIDED THORACIC BACK PAIN: ICD-10-CM

## 2021-01-15 DIAGNOSIS — M54.6 CHRONIC LEFT-SIDED THORACIC BACK PAIN: ICD-10-CM

## 2021-01-15 RX ORDER — TRAMADOL HYDROCHLORIDE 50 MG/1
50 TABLET ORAL
Qty: 90 TAB | Refills: 0 | Status: SHIPPED | OUTPATIENT
Start: 2021-01-15 | End: 2021-02-11 | Stop reason: SDUPTHER

## 2021-01-15 NOTE — TELEPHONE ENCOUNTER
Patient request for refill, Last OV 12/18/20, next scheduled 1/25/21. Patient states she is out of medication. Requested Prescriptions Pending Prescriptions Disp Refills  traMADoL (ULTRAM) 50 mg tablet 90 Tab 0 Sig: Take 1 Tab by mouth three (3) times daily as needed for Pain for up to 30 days. Max Daily Amount: 150 mg. DUE FOR APPT - SCHEDULE FOR FUTURE REFILLS.

## 2021-01-19 DIAGNOSIS — J44.9 CHRONIC OBSTRUCTIVE PULMONARY DISEASE, UNSPECIFIED COPD TYPE (HCC): ICD-10-CM

## 2021-01-19 RX ORDER — ALBUTEROL SULFATE 90 UG/1
AEROSOL, METERED RESPIRATORY (INHALATION)
Qty: 54 G | Refills: 0 | Status: SHIPPED | OUTPATIENT
Start: 2021-01-19 | End: 2021-01-22 | Stop reason: SDUPTHER

## 2021-01-20 ENCOUNTER — TELEPHONE (OUTPATIENT)
Dept: INTERNAL MEDICINE CLINIC | Age: 66
End: 2021-01-20

## 2021-01-20 DIAGNOSIS — J44.9 CHRONIC OBSTRUCTIVE PULMONARY DISEASE, UNSPECIFIED COPD TYPE (HCC): ICD-10-CM

## 2021-01-20 NOTE — TELEPHONE ENCOUNTER
PA required for [facility] [place]

## 2021-01-22 RX ORDER — ALBUTEROL SULFATE 90 UG/1
AEROSOL, METERED RESPIRATORY (INHALATION)
Qty: 8 G | Refills: 1 | Status: SHIPPED | OUTPATIENT
Start: 2021-01-22 | End: 2021-02-01 | Stop reason: CLARIF

## 2021-01-22 NOTE — TELEPHONE ENCOUNTER
I called and spoke with Shad Postal @ [phone]  She stated medication is not covered    She stated smaller vial is covered 8.5 grams covered   And Generic provenpil-hsa 6.7 grams is covered

## 2021-02-01 ENCOUNTER — TELEPHONE (OUTPATIENT)
Dept: INTERNAL MEDICINE CLINIC | Age: 66
End: 2021-02-01

## 2021-02-01 RX ORDER — ALBUTEROL SULFATE 90 UG/1
1-2 AEROSOL, METERED RESPIRATORY (INHALATION)
Qty: 1 INHALER | Refills: 1 | Status: SHIPPED | OUTPATIENT
Start: 2021-02-01 | End: 2021-07-20

## 2021-02-01 NOTE — TELEPHONE ENCOUNTER
Alternative medication requested. Ventolin HFA inhaler is not covered by patient's Ins. States Pro-Air is covered, or a PA will need to be run.

## 2021-02-03 ENCOUNTER — OFFICE VISIT (OUTPATIENT)
Dept: INTERNAL MEDICINE CLINIC | Age: 66
End: 2021-02-03
Payer: MEDICARE

## 2021-02-03 VITALS
OXYGEN SATURATION: 93 % | TEMPERATURE: 97.5 F | DIASTOLIC BLOOD PRESSURE: 76 MMHG | WEIGHT: 142.4 LBS | BODY MASS INDEX: 25.23 KG/M2 | RESPIRATION RATE: 18 BRPM | HEART RATE: 84 BPM | HEIGHT: 63 IN | SYSTOLIC BLOOD PRESSURE: 115 MMHG

## 2021-02-03 DIAGNOSIS — Z12.31 ENCOUNTER FOR SCREENING MAMMOGRAM FOR MALIGNANT NEOPLASM OF BREAST: ICD-10-CM

## 2021-02-03 DIAGNOSIS — R41.3 MEMORY LOSS: ICD-10-CM

## 2021-02-03 DIAGNOSIS — Z78.0 POSTMENOPAUSAL STATE: ICD-10-CM

## 2021-02-03 DIAGNOSIS — J44.9 CHRONIC OBSTRUCTIVE PULMONARY DISEASE, UNSPECIFIED COPD TYPE (HCC): ICD-10-CM

## 2021-02-03 DIAGNOSIS — Z00.00 MEDICARE ANNUAL WELLNESS VISIT, SUBSEQUENT: Primary | ICD-10-CM

## 2021-02-03 PROCEDURE — G9899 SCRN MAM PERF RSLTS DOC: HCPCS | Performed by: PHYSICIAN ASSISTANT

## 2021-02-03 PROCEDURE — 3017F COLORECTAL CA SCREEN DOC REV: CPT | Performed by: PHYSICIAN ASSISTANT

## 2021-02-03 PROCEDURE — G0439 PPPS, SUBSEQ VISIT: HCPCS | Performed by: PHYSICIAN ASSISTANT

## 2021-02-03 PROCEDURE — G8427 DOCREV CUR MEDS BY ELIG CLIN: HCPCS | Performed by: PHYSICIAN ASSISTANT

## 2021-02-03 PROCEDURE — 99213 OFFICE O/P EST LOW 20 MIN: CPT | Performed by: PHYSICIAN ASSISTANT

## 2021-02-03 PROCEDURE — G8419 CALC BMI OUT NRM PARAM NOF/U: HCPCS | Performed by: PHYSICIAN ASSISTANT

## 2021-02-03 PROCEDURE — G8432 DEP SCR NOT DOC, RNG: HCPCS | Performed by: PHYSICIAN ASSISTANT

## 2021-02-03 RX ORDER — ZOSTER VACCINE RECOMBINANT, ADJUVANTED 50 MCG/0.5
KIT INTRAMUSCULAR
Qty: 0.5 ML | Refills: 1 | Status: SHIPPED | OUTPATIENT
Start: 2021-02-03 | End: 2022-05-12

## 2021-02-03 RX ORDER — FLUTICASONE FUROATE, UMECLIDINIUM BROMIDE AND VILANTEROL TRIFENATATE 200; 62.5; 25 UG/1; UG/1; UG/1
1 POWDER RESPIRATORY (INHALATION) DAILY
Qty: 1 INHALER | Refills: 5 | Status: SHIPPED | OUTPATIENT
Start: 2021-02-03 | End: 2022-03-30 | Stop reason: SDUPTHER

## 2021-02-03 NOTE — PATIENT INSTRUCTIONS
Medicare Wellness Visit, Female The best way to live healthy is to have a lifestyle where you eat a well-balanced diet, exercise regularly, limit alcohol use, and quit all forms of tobacco/nicotine, if applicable. Regular preventive services are another way to keep healthy. Preventive services (vaccines, screening tests, monitoring & exams) can help personalize your care plan, which helps you manage your own care. Screening tests can find health problems at the earliest stages, when they are easiest to treat. [facility] follows the current, evidence-based guidelines published by the [facility] Kevin Rangel ([facility]) when recommending preventive services for our patients. Because we follow these guidelines, sometimes recommendations change over time as research supports it. (For example, mammograms used to be recommended annually. Even though Medicare will still pay for an annual mammogram, the newer guidelines recommend a mammogram every two years for women of average risk). Of course, you and your doctor may decide to screen more often for some diseases, based on your risk and your co-morbidities (chronic disease you are already diagnosed with). Preventive services for you include: - Medicare offers their members a free annual wellness visit, which is time for you and your primary care provider to discuss and plan for your preventive service needs. Take advantage of this benefit every year! 
-All adults over the age of 72 should receive the recommended pneumonia vaccines. Current USPSTF guidelines recommend a series of two vaccines for the best pneumonia protection.  
-All adults should have a flu vaccine yearly and a tetanus vaccine every 10 years.  
-All adults age 48 and older should receive the shingles vaccines (series of two vaccines). -All adults age 38-68 who are overweight should have a diabetes screening test once every three years. -All adults born between [birth year] and 1963 should be screened once for Hepatitis C. 
-Other screening tests and preventive services for persons with diabetes include: an eye exam to screen for diabetic retinopathy, a kidney function test, a foot exam, and stricter control over your cholesterol.  
-Cardiovascular screening for adults with routine risk involves an electrocardiogram (ECG) at intervals determined by your doctor.  
-Colorectal cancer screenings should be done for adults age 54-65 with no increased risk factors for colorectal cancer. There are a number of acceptable methods of screening for this type of cancer. Each test has its own benefits and drawbacks. Discuss with your doctor what is most appropriate for you during your annual wellness visit. The different tests include: colonoscopy (considered the best screening method), a fecal occult blood test, a fecal DNA test, and sigmoidoscopy. 
 
-A bone mass density test is recommended when a woman turns 65 to screen for osteoporosis. This test is only recommended one time, as a screening. Some providers will use this same test as a disease monitoring tool if you already have osteoporosis. -Breast cancer screenings are recommended every other year for women of normal risk, age 54-69. 
-Cervical cancer screenings for women over age 72 are only recommended with certain risk factors. Here is a list of your current Health Maintenance items (your personalized list of preventive services) with a due date: 
Health Maintenance Due Topic Date Due  
 COVID-19 Vaccine (1 of 2) 08/19/1973  
 DTaP/Tdap/Td  (1 - Tdap) 08/19/1978  Shingles Vaccine (1 of 2) 08/19/2007  Pap Test  06/21/2016 Patric Albert Annual Well Visit  05/12/2018  Mammogram  08/10/2019 Chronic Obstructive Pulmonary Disease (COPD): Care Instructions Your Care Instructions Chronic obstructive pulmonary disease (COPD) is a general term for a group of lung diseases, including emphysema and chronic bronchitis. People with COPD have decreased airflow in and out of the lungs, which makes it hard to breathe. The airways also can get clogged with thick mucus. Cigarette smoking is a major cause of COPD. Although there is no cure for COPD, you can slow its progress. Following your treatment plan and taking care of yourself can help you feel better and live longer. Follow-up care is a key part of your treatment and safety. Be sure to make and go to all appointments, and call your doctor if you are having problems. It's also a good idea to know your test results and keep a list of the medicines you take. How can you care for yourself at home? Staying healthy 
  · Do not smoke. This is the most important step you can take to prevent more damage to your lungs. If you need help quitting, talk to your doctor about stop-smoking programs and medicines. These can increase your chances of quitting for good.  
  · Avoid colds and flu. Get a pneumococcal vaccine shot. If you have had one before, ask your doctor whether you need a second dose. Get the flu vaccine every fall. If you must be around people with colds or the flu, wash your hands often.  
  · Avoid secondhand smoke, air pollution, and high altitudes. Also avoid cold, dry air and hot, humid air. Stay at home with your windows closed when air pollution is bad. Medicines and oxygen therapy 
  · Take your medicines exactly as prescribed. Call your doctor if you think you are having a problem with your medicine. You may be taking medicines such as: ? Bronchodilators. These help open your airways and make breathing easier. They are either short-acting (work for 6 to 9 hours) or long-acting (work for 24 hours). You inhale most bronchodilators, so they start to act quickly. Always carry your quick-relief inhaler with you in case you need it while you are away from home. ? Corticosteroids (prednisone, budesonide). These reduce airway inflammation. They come in pill or inhaled form. You must take these medicines every day for them to work well.  
  · Ask your doctor or pharmacist if a spacer is right for you. A spacer may help you get more inhaled medicine to your lungs. If you use one, ask how to use it properly.  
  · Do not take any vitamins, over-the-counter medicine, or herbal products without talking to your doctor first.  
  · If your doctor prescribed antibiotics, take them as directed. Do not stop taking them just because you feel better. You need to take the full course of antibiotics.  
  · If you use oxygen therapy, use the flow rate your doctor has recommended. Don't change it without talking to your doctor first. Oxygen therapy boosts the amount of oxygen in your blood and helps you breathe easier. Activity 
  · Get regular exercise. Walking is an easy way to get exercise. Start out slowly, and walk a little more each day.  
  · Pay attention to your breathing. You are exercising too hard if you can't talk while you exercise.  
  · Take short rest breaks when doing household chores and other activities.  
  · Learn breathing methodssuch as breathing through pursed lipsto help you become less short of breath.  
  · If your doctor has not set you up with a pulmonary rehabilitation program, ask if rehab is right for you. Rehab includes exercise programs, education about your disease and how to manage it, help with diet and other changes, and emotional support. Diet   · Eat regular, healthy meals. Use bronchodilators about 1 hour before you eat to make it easier to eat. Eat several small meals instead of three large ones. Drink beverages at the end of the meal. Avoid foods that are hard to chew.  
  · Eat foods that contain protein so you don't lose muscle mass.  
  · Talk with your doctor if you gain too much weight or if you lose weight without trying. Mental health 
  · Talk to your family, friends, or a therapist about your feelings. Some people feel frightened, angry, hopeless, helpless, and even guilty. Talking openly about bad feelings can help you cope. If these feelings last, talk to your doctor. When should you call for help? Call 911 anytime you think you may need emergency care. For example, call if: 
  · You have severe trouble breathing. Call your doctor now or seek immediate medical care if: 
  · You have new or worse trouble breathing.  
  · You cough up blood.  
  · You have a fever. Watch closely for changes in your health, and be sure to contact your doctor if: 
  · You cough more deeply or more often, especially if you notice more mucus or a change in the color of your mucus.  
  · You have new or worse swelling in your legs or belly.  
  · You are not getting better as expected. Where can you learn more? Go to http://www.[employer]/ Morelia Bernal in the search box to learn more about \"Chronic Obstructive Pulmonary Disease (COPD): Care Instructions. \" Current as of: February 24, 2020               Content Version: 12.6 © 2519-0324 [employer]. Care instructions adapted under license by [employer] (which disclaims liability or warranty for this information). If you have questions about a medical condition or this instruction, always ask your healthcare professional. [address] any warranty or liability for your use of this information. Back Stretches: Exercises Introduction Here are some examples of exercises for stretching your back. Start each exercise slowly. Ease off the exercise if you start to have pain. Your doctor or physical therapist will tell you when you can start these exercises and which ones will work best for you. How to do the exercises Overhead stretch 1. Stand comfortably with your feet shoulder-width apart. 2. Looking straight ahead, raise both arms over your head and reach toward the ceiling. Do not allow your head to tilt back. 3. Hold for 15 to 30 seconds, then lower your arms to your sides. 4. Repeat 2 to 4 times. Side stretch 1. Stand comfortably with your feet shoulder-width apart. 2. Raise one arm over your head, and then lean to the other side. 3. Slide your hand down your leg as you let the weight of your arm gently stretch your side muscles. Hold for 15 to 30 seconds. 4. Repeat 2 to 4 times on each side. Press-up 1. Lie on your stomach, supporting your body with your forearms. 2. Press your elbows down into the floor to raise your upper back. As you do this, relax your stomach muscles and allow your back to arch without using your back muscles. As your press up, do not let your hips or pelvis come off the floor. 3. Hold for 15 to 30 seconds, then relax. 4. Repeat 2 to 4 times. Relax and rest  
1. Lie on your back with a rolled towel under your neck and a pillow under your knees. Extend your arms comfortably to your sides. 2. Relax and breathe normally. 3. Remain in this position for about 10 minutes. 4. If you can, do this 2 or 3 times each day. Follow-up care is a key part of your treatment and safety. Be sure to make and go to all appointments, and call your doctor if you are having problems. It's also a good idea to know your test results and keep a list of the medicines you take. Where can you learn more? Go to http://www.[employer]/ Enter Q510 in the search box to learn more about \"Back Stretches: Exercises. \" Current as of: March 2, 2020               Content Version: 12.6 © 5621-0399 [employer]. Care instructions adapted under license by [employer] (which disclaims liability or warranty for this information). If you have questions about a medical condition or this instruction, always ask your healthcare professional. [place] [address] any warranty or liability for your use of this information.

## 2021-02-03 NOTE — PROGRESS NOTES
Hunter Shea is a 63 y.o. female (: 1955) presenting to address:    Chief Complaint   Patient presents with    Annual Wellness Visit       Vitals:    21 1428   BP: 115/76   Pulse: 84   Resp: 18   Temp: 97.5 °F (36.4 °C)   TempSrc: Oral   SpO2: 93%   Weight: 142 lb 6.4 oz (64.6 kg)   Height: 5' 3\" (1.6 m)       Hearing/Vision:      Hearing Screening    125Hz 250Hz 500Hz 1000Hz 2000Hz 3000Hz 4000Hz 6000Hz 8000Hz   Right ear:            Left ear:               Visual Acuity Screening    Right eye Left eye Both eyes   Without correction: 20/40 20/30 20/25   With correction:          Learning Assessment:     Learning Assessment 2/3/2021   PRIMARY LEARNER Patient   HIGHEST LEVEL OF EDUCATION - PRIMARY LEARNER  DID NOT GRADUATE HIGH SCHOOL   BARRIERS PRIMARY LEARNER NONE   CO-LEARNER CAREGIVER No   PRIMARY LANGUAGE ENGLISH   LEARNER PREFERENCE PRIMARY OTHER (COMMENT)   ANSWERED BY patient    RELATIONSHIP SELF     Depression Screening:     3 most recent PHQ Screens 2/3/2021   Little interest or pleasure in doing things Not at all   Feeling down, depressed, irritable, or hopeless Not at all   Total Score PHQ 2 0     Fall Risk Assessment:     Fall Risk Assessment, last 12 mths 2/3/2021   Able to walk? Yes   Fall in past 12 months? 0     Abuse Screening:     Abuse Screening Questionnaire 2/3/2021   Do you ever feel afraid of your partner? N   Are you in a relationship with someone who physically or mentally threatens you? N   Is it safe for you to go home? Y     Coordination of Care Questionaire:   1. Have you been to the ER, urgent care clinic since your last visit? Hospitalized since your last visit? NO    2. Have you seen or consulted any other health care providers outside of the [address] [place] [address] since your last visit? Include any pap smears or colon screening. NO    Advanced Directive:   1. Do you have an Advanced Directive? NO    2. Would you like information on Advanced Directives? NO
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
## 2021-02-03 NOTE — PROGRESS NOTES
HISTORY OF PRESENT ILLNESS  Jolynn Rosales is a 63 y.o. female. HPI  Routine f/u.     1) SOB - gradual worsening. States she was unable to consult with Dr. Derrick Brand - states she pays a $90 co-pay for specialty visits. She is agreeable to trying to complete a PFT at [facility]. - Recent CXR significant for hyperinflation. No acute cardiopulm disease. 2) X-rays reviewed - significant for arthritis. See reports. 3) Memory - she feels this is slipping, and she would like to start [place] [address]. Asks for an order so that her insurance helps pay for it. Review of Systems   Respiratory: Positive for cough (mild, intermittent), shortness of breath and wheezing. Negative for sputum production. Visit Vitals  /76 (BP 1 Location: Right upper arm, BP Patient Position: Sitting, BP Cuff Size: Adult)   Pulse 84   Temp 97.5 °F (36.4 °C) (Oral)   Resp 18   Ht 5' 3\" (1.6 m)   Wt 142 lb 6.4 oz (64.6 kg)   SpO2 93%   BMI 25.23 kg/m²       Physical Exam  Constitutional:       General: She is not in acute distress. Appearance: Normal appearance. She is well-developed. HENT:      Head: Normocephalic and atraumatic. Right Ear: Tympanic membrane, ear canal and external ear normal.      Left Ear: Tympanic membrane, ear canal and external ear normal.      Nose: Nose normal.      Mouth/Throat:      Comments: Mask  Eyes:      General: No scleral icterus. Conjunctiva/sclera: Conjunctivae normal.      Pupils: Pupils are equal, round, and reactive to light. Neck:      Musculoskeletal: Neck supple. Cardiovascular:      Rate and Rhythm: Normal rate and regular rhythm. Pulses: Normal pulses. Dorsalis pedis pulses are 2+ on the right side and 2+ on the left side. Posterior tibial pulses are 2+ on the right side and 2+ on the left side. Heart sounds: Normal heart sounds. No murmur. No gallop. Pulmonary:      Effort: Pulmonary effort is normal. No respiratory distress. Breath sounds: Normal breath sounds. No decreased breath sounds, wheezing, rhonchi or rales. Musculoskeletal:      Right lower leg: No edema. Left lower leg: No edema. Lymphadenopathy:      Head:      Right side of head: No submandibular or tonsillar adenopathy. Left side of head: No submandibular or tonsillar adenopathy. Cervical: No cervical adenopathy. Upper Body:      Right upper body: No supraclavicular adenopathy. Left upper body: No supraclavicular adenopathy. Skin:     General: Skin is warm and dry. Neurological:      Mental Status: She is alert and oriented to person, place, and time. Psychiatric:         Speech: Speech normal.         ASSESSMENT and PLAN  Diagnoses and all orders for this visit:    4. Chronic obstructive pulmonary disease, unspecified COPD type ([facility] [address]  -     PULMONARY FUNCTION TEST; Future   - [facility] requested. -     fluticasone-umeclidin-vilanter (Trelegy Ellipta) 200-62.5-25 mcg dsdv; Take 1 Puff by inhalation daily.   - Trial instead of Spiriva and Symbicort. 5. Memory loss  -     AMB SUPPLY ORDER   - [facility] - order mailed to patient. Forgot to give during appt. Follow-up and Dispositions    · Return for Schedule WWE.

## 2021-02-11 DIAGNOSIS — G89.29 CHRONIC LEFT-SIDED THORACIC BACK PAIN: ICD-10-CM

## 2021-02-11 DIAGNOSIS — M54.6 CHRONIC LEFT-SIDED THORACIC BACK PAIN: ICD-10-CM

## 2021-02-12 RX ORDER — TRAMADOL HYDROCHLORIDE 50 MG/1
50 TABLET ORAL
Qty: 90 TAB | Refills: 0 | Status: SHIPPED | OUTPATIENT
Start: 2021-02-12 | End: 2021-03-09 | Stop reason: SDUPTHER

## 2021-02-21 DIAGNOSIS — Z76.0 MEDICATION REFILL: ICD-10-CM

## 2021-02-22 RX ORDER — SIMVASTATIN 10 MG/1
TABLET, FILM COATED ORAL
Qty: 90 TAB | Refills: 1 | Status: SHIPPED | OUTPATIENT
Start: 2021-02-22 | End: 2021-03-17

## 2021-02-22 RX ORDER — GUAIFENESIN 100 MG/5ML
LIQUID (ML) ORAL
Qty: 90 TAB | Refills: 3 | Status: SHIPPED | OUTPATIENT
Start: 2021-02-22 | End: 2021-03-17

## 2021-03-09 DIAGNOSIS — M54.6 CHRONIC LEFT-SIDED THORACIC BACK PAIN: ICD-10-CM

## 2021-03-09 DIAGNOSIS — G89.29 CHRONIC LEFT-SIDED THORACIC BACK PAIN: ICD-10-CM

## 2021-03-10 RX ORDER — TRAMADOL HYDROCHLORIDE 50 MG/1
50 TABLET ORAL
Qty: 90 TAB | Refills: 0 | Status: SHIPPED | OUTPATIENT
Start: 2021-03-12 | End: 2021-04-08 | Stop reason: SDUPTHER

## 2021-03-14 DIAGNOSIS — K59.00 CONSTIPATION, UNSPECIFIED CONSTIPATION TYPE: ICD-10-CM

## 2021-03-15 RX ORDER — DOCUSATE SODIUM 100 MG/1
CAPSULE, LIQUID FILLED ORAL
Qty: 180 CAP | Refills: 3 | Status: SHIPPED | OUTPATIENT
Start: 2021-03-15 | End: 2022-03-17

## 2021-03-29 DIAGNOSIS — Z76.0 MEDICATION REFILL: ICD-10-CM

## 2021-03-29 RX ORDER — TRAZODONE HYDROCHLORIDE 50 MG/1
TABLET ORAL
Qty: 180 TAB | Refills: 1 | Status: SHIPPED | OUTPATIENT
Start: 2021-03-29 | End: 2022-01-18

## 2021-04-08 ENCOUNTER — OFFICE VISIT (OUTPATIENT)
Dept: INTERNAL MEDICINE CLINIC | Age: 66
End: 2021-04-08
Payer: MEDICARE

## 2021-04-08 VITALS
DIASTOLIC BLOOD PRESSURE: 76 MMHG | HEIGHT: 63 IN | HEART RATE: 71 BPM | TEMPERATURE: 97.1 F | OXYGEN SATURATION: 94 % | SYSTOLIC BLOOD PRESSURE: 109 MMHG | BODY MASS INDEX: 25.69 KG/M2 | WEIGHT: 145 LBS | RESPIRATION RATE: 18 BRPM

## 2021-04-08 DIAGNOSIS — G89.29 CHRONIC RIGHT-SIDED HEADACHES: ICD-10-CM

## 2021-04-08 DIAGNOSIS — M54.6 CHRONIC LEFT-SIDED THORACIC BACK PAIN: ICD-10-CM

## 2021-04-08 DIAGNOSIS — F41.9 ANXIETY: Primary | ICD-10-CM

## 2021-04-08 DIAGNOSIS — G89.29 CHRONIC LEFT-SIDED THORACIC BACK PAIN: ICD-10-CM

## 2021-04-08 DIAGNOSIS — R51.9 CHRONIC RIGHT-SIDED HEADACHES: ICD-10-CM

## 2021-04-08 PROCEDURE — G8419 CALC BMI OUT NRM PARAM NOF/U: HCPCS | Performed by: INTERNAL MEDICINE

## 2021-04-08 PROCEDURE — 3017F COLORECTAL CA SCREEN DOC REV: CPT | Performed by: INTERNAL MEDICINE

## 2021-04-08 PROCEDURE — G9899 SCRN MAM PERF RSLTS DOC: HCPCS | Performed by: INTERNAL MEDICINE

## 2021-04-08 PROCEDURE — G8510 SCR DEP NEG, NO PLAN REQD: HCPCS | Performed by: INTERNAL MEDICINE

## 2021-04-08 PROCEDURE — 99214 OFFICE O/P EST MOD 30 MIN: CPT | Performed by: INTERNAL MEDICINE

## 2021-04-08 PROCEDURE — G8427 DOCREV CUR MEDS BY ELIG CLIN: HCPCS | Performed by: INTERNAL MEDICINE

## 2021-04-08 RX ORDER — TRAMADOL HYDROCHLORIDE 50 MG/1
50 TABLET ORAL
Qty: 90 TAB | Refills: 0 | Status: SHIPPED | OUTPATIENT
Start: 2021-04-08 | End: 2021-05-06 | Stop reason: SDUPTHER

## 2021-04-08 NOTE — PROGRESS NOTES
Saúl Ocasio is a 63 y.o. female (: 1955) presenting to address:    Chief Complaint   Patient presents with    Headache     x one month                 pain scale 0/10    Dizziness       Vitals:    21 1028   BP: 109/76   Pulse: 71   Resp: 18   Temp: 97.1 °F (36.2 °C)   TempSrc: Oral   SpO2: 94%   Weight: 145 lb (65.8 kg)   Height: 5' 3\" (1.6 m)   PainSc:   0 - No pain       Hearing/Vision:   No exam data present    Learning Assessment:     Learning Assessment 2/3/2021   PRIMARY LEARNER Patient   HIGHEST LEVEL OF EDUCATION - PRIMARY LEARNER  DID NOT GRADUATE HIGH SCHOOL   BARRIERS PRIMARY LEARNER NONE   CO-LEARNER CAREGIVER No   PRIMARY LANGUAGE ENGLISH   LEARNER PREFERENCE PRIMARY OTHER (COMMENT)   ANSWERED BY patient    RELATIONSHIP SELF     Depression Screening:     3 most recent PHQ Screens 2021   Little interest or pleasure in doing things Not at all   Feeling down, depressed, irritable, or hopeless Not at all   Total Score PHQ 2 0     Fall Risk Assessment:     Fall Risk Assessment, last 12 mths 2/3/2021   Able to walk? Yes   Fall in past 12 months? 0     Abuse Screening:     Abuse Screening Questionnaire 2/3/2021   Do you ever feel afraid of your partner? N   Are you in a relationship with someone who physically or mentally threatens you? N   Is it safe for you to go home? Y     Coordination of Care Questionaire:   1. Have you been to the ER, urgent care clinic since your last visit? Hospitalized since your last visit? NO    2. Have you seen or consulted any other health care providers outside of the [address] [place] [address] since your last visit? Include any pap smears or colon screening. NO    Advanced Directive:   1. Do you have an Advanced Directive? NO    2. Would you like information on Advanced Directives?  NO
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
## 2021-04-30 ENCOUNTER — HOSPITAL ENCOUNTER (OUTPATIENT)
Dept: WOMENS IMAGING | Age: 66
Discharge: HOME | End: 2021-04-30
Attending: PHYSICIAN ASSISTANT
Payer: MEDICARE

## 2021-04-30 ENCOUNTER — HOSPITAL ENCOUNTER (OUTPATIENT)
Dept: BONE DENSITY | Age: 66
Discharge: HOME | End: 2021-04-30
Attending: PHYSICIAN ASSISTANT
Payer: MEDICARE

## 2021-04-30 DIAGNOSIS — Z12.31 ENCOUNTER FOR SCREENING MAMMOGRAM FOR MALIGNANT NEOPLASM OF BREAST: ICD-10-CM

## 2021-04-30 DIAGNOSIS — Z78.0 POSTMENOPAUSAL STATE: ICD-10-CM

## 2021-04-30 PROCEDURE — 77080 DXA BONE DENSITY AXIAL: CPT

## 2021-04-30 PROCEDURE — 77063 BREAST TOMOSYNTHESIS BI: CPT

## 2021-05-02 DIAGNOSIS — N63.20 LEFT BREAST MASS: Primary | ICD-10-CM

## 2021-05-03 NOTE — PROGRESS NOTES
Spoke with patient and gave resulted note. Patient wanted to go to [facility]   Order faxed and I gave pt scheduling number.

## 2021-05-03 NOTE — PROGRESS NOTES
Please notify Ms. Boston France that her mammogram showed a new, very small mass in her left breast. They recommend we obtain a left breast ultrasound to better evaluate this. I have placed the order. Please let us know if she does not hear about scheduling this.

## 2021-05-05 NOTE — PROGRESS NOTES
Patient was contacted and given the [facility]'s center contact info. This office is still open, and she can go there for her [address]. She is agreeable.

## 2021-05-06 DIAGNOSIS — G89.29 CHRONIC LEFT-SIDED THORACIC BACK PAIN: ICD-10-CM

## 2021-05-06 DIAGNOSIS — M54.6 CHRONIC LEFT-SIDED THORACIC BACK PAIN: ICD-10-CM

## 2021-05-07 ENCOUNTER — TELEPHONE (OUTPATIENT)
Dept: INTERNAL MEDICINE CLINIC | Age: 66
End: 2021-05-07

## 2021-05-07 DIAGNOSIS — N63.20 LEFT BREAST MASS: Primary | ICD-10-CM

## 2021-05-07 DIAGNOSIS — R92.8 ABNORMALITY OF LEFT BREAST ON SCREENING MAMMOGRAM: ICD-10-CM

## 2021-05-07 RX ORDER — TRAMADOL HYDROCHLORIDE 50 MG/1
50 TABLET ORAL
Qty: 90 TAB | Refills: 0 | Status: SHIPPED | OUTPATIENT
Start: 2021-05-07 | End: 2021-06-07 | Stop reason: SDUPTHER

## 2021-05-07 NOTE — TELEPHONE ENCOUNTER
Scheduling called needing an order for Dx mammogram of L breast to go along with the U/S of L breast that you have already ordered please submit

## 2021-05-25 ENCOUNTER — HOSPITAL ENCOUNTER (OUTPATIENT)
Dept: ULTRASOUND IMAGING | Age: 66
Discharge: HOME | End: 2021-05-25
Attending: PHYSICIAN ASSISTANT
Payer: MEDICARE

## 2021-05-25 ENCOUNTER — HOSPITAL ENCOUNTER (OUTPATIENT)
Dept: WOMENS IMAGING | Age: 66
Discharge: HOME | End: 2021-05-25
Attending: PHYSICIAN ASSISTANT
Payer: MEDICARE

## 2021-05-25 DIAGNOSIS — N63.20 LEFT BREAST MASS: ICD-10-CM

## 2021-05-25 DIAGNOSIS — R92.8 ABNORMALITY OF LEFT BREAST ON SCREENING MAMMOGRAM: ICD-10-CM

## 2021-05-25 PROCEDURE — 76642 ULTRASOUND BREAST LIMITED: CPT

## 2021-05-25 PROCEDURE — 77061 BREAST TOMOSYNTHESIS UNI: CPT

## 2021-05-26 NOTE — PROGRESS NOTES
Please notify Ms. Florina Villalobos that I am very pleased to report her additional mammogram and ultrasound of her left breast showed no suspicious finding. It looks like the area of concern is a benign small simple cyst that is not worrisome. She can return to her yearly screening mammograms.

## 2021-06-07 ENCOUNTER — PATIENT MESSAGE (OUTPATIENT)
Dept: INTERNAL MEDICINE CLINIC | Age: 66
End: 2021-06-07

## 2021-06-07 DIAGNOSIS — M54.6 CHRONIC LEFT-SIDED THORACIC BACK PAIN: ICD-10-CM

## 2021-06-07 DIAGNOSIS — G89.29 CHRONIC LEFT-SIDED THORACIC BACK PAIN: ICD-10-CM

## 2021-06-07 RX ORDER — TRAMADOL HYDROCHLORIDE 50 MG/1
50 TABLET ORAL
Qty: 90 TABLET | Refills: 0 | Status: SHIPPED | OUTPATIENT
Start: 2021-06-07 | End: 2021-07-06 | Stop reason: SDUPTHER

## 2021-06-07 NOTE — TELEPHONE ENCOUNTER
Called pt and left message. Call back number left and I myself or one of the other nurses will attempt to contact again. The call was to inform pt a follow up appt is needed. [employer] message sent.

## 2021-07-16 DIAGNOSIS — K21.9 GASTROESOPHAGEAL REFLUX DISEASE WITHOUT ESOPHAGITIS: ICD-10-CM

## 2021-07-16 DIAGNOSIS — K59.09 CHRONIC CONSTIPATION: ICD-10-CM

## 2021-07-16 DIAGNOSIS — Z76.0 MEDICATION REFILL: ICD-10-CM

## 2021-07-17 RX ORDER — OMEPRAZOLE 20 MG/1
CAPSULE, DELAYED RELEASE ORAL
Qty: 90 CAPSULE | Refills: 1 | Status: SHIPPED | OUTPATIENT
Start: 2021-07-17 | End: 2022-01-18

## 2021-07-17 RX ORDER — LINACLOTIDE 145 UG/1
CAPSULE, GELATIN COATED ORAL
Qty: 90 CAPSULE | Refills: 1 | Status: SHIPPED | OUTPATIENT
Start: 2021-07-17 | End: 2021-10-26

## 2021-07-21 DIAGNOSIS — M77.11 LATERAL EPICONDYLITIS OF BOTH ELBOWS: ICD-10-CM

## 2021-07-21 DIAGNOSIS — M77.12 LATERAL EPICONDYLITIS OF BOTH ELBOWS: ICD-10-CM

## 2021-07-21 RX ORDER — NAPROXEN 500 MG/1
TABLET ORAL
Qty: 60 TABLET | Refills: 0 | Status: SHIPPED | OUTPATIENT
Start: 2021-07-21

## 2021-08-03 ENCOUNTER — OFFICE VISIT (OUTPATIENT)
Dept: INTERNAL MEDICINE CLINIC | Age: 66
End: 2021-08-03
Payer: MEDICARE

## 2021-08-03 VITALS
WEIGHT: 145.8 LBS | SYSTOLIC BLOOD PRESSURE: 133 MMHG | DIASTOLIC BLOOD PRESSURE: 84 MMHG | RESPIRATION RATE: 17 BRPM | OXYGEN SATURATION: 95 % | HEIGHT: 63 IN | BODY MASS INDEX: 25.83 KG/M2 | HEART RATE: 65 BPM

## 2021-08-03 DIAGNOSIS — R51.9 CHRONIC NONINTRACTABLE HEADACHE, UNSPECIFIED HEADACHE TYPE: ICD-10-CM

## 2021-08-03 DIAGNOSIS — M54.6 CHRONIC LEFT-SIDED THORACIC BACK PAIN: ICD-10-CM

## 2021-08-03 DIAGNOSIS — G89.29 CHRONIC NONINTRACTABLE HEADACHE, UNSPECIFIED HEADACHE TYPE: ICD-10-CM

## 2021-08-03 DIAGNOSIS — M75.32 CALCIFIC TENDONITIS OF LEFT SHOULDER: Primary | ICD-10-CM

## 2021-08-03 DIAGNOSIS — G89.29 CHRONIC LEFT-SIDED THORACIC BACK PAIN: ICD-10-CM

## 2021-08-03 DIAGNOSIS — Z76.0 MEDICATION REFILL: ICD-10-CM

## 2021-08-03 PROCEDURE — G9899 SCRN MAM PERF RSLTS DOC: HCPCS | Performed by: INTERNAL MEDICINE

## 2021-08-03 PROCEDURE — 99214 OFFICE O/P EST MOD 30 MIN: CPT | Performed by: INTERNAL MEDICINE

## 2021-08-03 PROCEDURE — G8419 CALC BMI OUT NRM PARAM NOF/U: HCPCS | Performed by: INTERNAL MEDICINE

## 2021-08-03 PROCEDURE — G8427 DOCREV CUR MEDS BY ELIG CLIN: HCPCS | Performed by: INTERNAL MEDICINE

## 2021-08-03 PROCEDURE — 3017F COLORECTAL CA SCREEN DOC REV: CPT | Performed by: INTERNAL MEDICINE

## 2021-08-03 PROCEDURE — G8510 SCR DEP NEG, NO PLAN REQD: HCPCS | Performed by: INTERNAL MEDICINE

## 2021-08-03 RX ORDER — TRAMADOL HYDROCHLORIDE 50 MG/1
50 TABLET ORAL
Qty: 90 TABLET | Refills: 0 | Status: SHIPPED | OUTPATIENT
Start: 2021-08-03 | End: 2021-09-01 | Stop reason: SDUPTHER
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
## 2021-09-01 DIAGNOSIS — M54.6 CHRONIC LEFT-SIDED THORACIC BACK PAIN: ICD-10-CM

## 2021-09-01 DIAGNOSIS — G89.29 CHRONIC LEFT-SIDED THORACIC BACK PAIN: ICD-10-CM

## 2021-09-01 DIAGNOSIS — M75.32 CALCIFIC TENDONITIS OF LEFT SHOULDER: ICD-10-CM

## 2021-09-02 RX ORDER — TRAMADOL HYDROCHLORIDE 50 MG/1
50 TABLET ORAL
Qty: 90 TABLET | Refills: 0 | Status: SHIPPED | OUTPATIENT
Start: 2021-09-02 | End: 2021-10-04 | Stop reason: SDUPTHER

## 2021-10-25 DIAGNOSIS — K59.09 CHRONIC CONSTIPATION: ICD-10-CM

## 2021-10-26 RX ORDER — LINACLOTIDE 145 UG/1
CAPSULE, GELATIN COATED ORAL
Qty: 90 CAPSULE | Refills: 1 | Status: SHIPPED | OUTPATIENT
Start: 2021-10-26 | End: 2022-07-06

## 2021-10-27 DIAGNOSIS — M77.12 LATERAL EPICONDYLITIS OF BOTH ELBOWS: ICD-10-CM

## 2021-10-27 DIAGNOSIS — M77.11 LATERAL EPICONDYLITIS OF BOTH ELBOWS: ICD-10-CM

## 2021-10-27 RX ORDER — NAPROXEN 500 MG/1
TABLET ORAL
Qty: 60 TABLET | Refills: 0 | OUTPATIENT
Start: 2021-10-27

## 2021-11-04 ENCOUNTER — OFFICE VISIT (OUTPATIENT)
Dept: INTERNAL MEDICINE CLINIC | Age: 66
End: 2021-11-04
Payer: MEDICARE

## 2021-11-04 ENCOUNTER — HOSPITAL ENCOUNTER (OUTPATIENT)
Dept: LAB | Age: 66
Discharge: HOME | End: 2021-11-04
Payer: MEDICARE

## 2021-11-04 VITALS
BODY MASS INDEX: 25.16 KG/M2 | OXYGEN SATURATION: 92 % | RESPIRATION RATE: 18 BRPM | HEART RATE: 77 BPM | TEMPERATURE: 96.9 F | WEIGHT: 142 LBS | HEIGHT: 63 IN | SYSTOLIC BLOOD PRESSURE: 142 MMHG | DIASTOLIC BLOOD PRESSURE: 82 MMHG

## 2021-11-04 DIAGNOSIS — E78.00 PURE HYPERCHOLESTEROLEMIA: Primary | ICD-10-CM

## 2021-11-04 DIAGNOSIS — M54.6 CHRONIC LEFT-SIDED THORACIC BACK PAIN: ICD-10-CM

## 2021-11-04 DIAGNOSIS — G89.29 CHRONIC LEFT-SIDED THORACIC BACK PAIN: ICD-10-CM

## 2021-11-04 DIAGNOSIS — M75.32 CALCIFIC TENDONITIS OF LEFT SHOULDER: ICD-10-CM

## 2021-11-04 DIAGNOSIS — G89.29 CHRONIC LOW BACK PAIN WITHOUT SCIATICA, UNSPECIFIED BACK PAIN LATERALITY: ICD-10-CM

## 2021-11-04 DIAGNOSIS — E78.00 PURE HYPERCHOLESTEROLEMIA: ICD-10-CM

## 2021-11-04 DIAGNOSIS — M54.50 CHRONIC LOW BACK PAIN WITHOUT SCIATICA, UNSPECIFIED BACK PAIN LATERALITY: ICD-10-CM

## 2021-11-04 LAB
CHOLEST SERPL-MCNC: 180 MG/DL
HDLC SERPL-MCNC: 78 MG/DL (ref 40–60)
HDLC SERPL: 2.3 {RATIO} (ref 0–5)
LDLC SERPL CALC-MCNC: 85 MG/DL (ref 0–100)
LIPID PROFILE,FLP: ABNORMAL
TRIGL SERPL-MCNC: 85 MG/DL (ref ?–150)
VLDLC SERPL CALC-MCNC: 17 MG/DL

## 2021-11-04 PROCEDURE — G8419 CALC BMI OUT NRM PARAM NOF/U: HCPCS | Performed by: INTERNAL MEDICINE

## 2021-11-04 PROCEDURE — 3017F COLORECTAL CA SCREEN DOC REV: CPT | Performed by: INTERNAL MEDICINE

## 2021-11-04 PROCEDURE — G8431 POS CLIN DEPRES SCRN F/U DOC: HCPCS | Performed by: INTERNAL MEDICINE

## 2021-11-04 PROCEDURE — G0480 DRUG TEST DEF 1-7 CLASSES: HCPCS

## 2021-11-04 PROCEDURE — 80061 LIPID PANEL: CPT

## 2021-11-04 PROCEDURE — G8427 DOCREV CUR MEDS BY ELIG CLIN: HCPCS | Performed by: INTERNAL MEDICINE

## 2021-11-04 PROCEDURE — 36415 COLL VENOUS BLD VENIPUNCTURE: CPT

## 2021-11-04 PROCEDURE — G9899 SCRN MAM PERF RSLTS DOC: HCPCS | Performed by: INTERNAL MEDICINE

## 2021-11-04 PROCEDURE — 99213 OFFICE O/P EST LOW 20 MIN: CPT | Performed by: INTERNAL MEDICINE

## 2021-11-04 RX ORDER — TRAMADOL HYDROCHLORIDE 50 MG/1
50 TABLET ORAL
Qty: 90 TABLET | Refills: 0 | Status: SHIPPED | OUTPATIENT
Start: 2021-11-04 | End: 2021-12-02 | Stop reason: SDUPTHER

## 2021-11-04 NOTE — PROGRESS NOTES
Jelena Bermeo is a 61 y.o. female (: 1955) presenting to address:    Chief Complaint   Patient presents with    Medication Refill       Vitals:    21 1137   BP: (!) 142/82   Pulse: 77   Resp: 18   Temp: 96.9 °F (36.1 °C)   TempSrc: Temporal   SpO2: 92%   Weight: 142 lb (64.4 kg)   Height: 5' 3\" (1.6 m)   PainSc:   8   PainLoc: Back       Hearing/Vision:   No exam data present    Learning Assessment:     Learning Assessment 2/3/2021   PRIMARY LEARNER Patient   HIGHEST LEVEL OF EDUCATION - PRIMARY LEARNER  DID NOT GRADUATE HIGH SCHOOL   BARRIERS PRIMARY LEARNER NONE   CO-LEARNER CAREGIVER No   PRIMARY LANGUAGE ENGLISH   LEARNER PREFERENCE PRIMARY OTHER (COMMENT)   ANSWERED BY patient    RELATIONSHIP SELF     Depression Screening:     3 most recent PHQ Screens 2021   Little interest or pleasure in doing things Nearly every day   Feeling down, depressed, irritable, or hopeless Nearly every day   Total Score PHQ 2 6   Trouble falling or staying asleep, or sleeping too much Not at all   Feeling tired or having little energy Nearly every day   Poor appetite, weight loss, or overeating Nearly every day   Feeling bad about yourself - or that you are a failure or have let yourself or your family down Not at all   Trouble concentrating on things such as school, work, reading, or watching TV Not at all   Moving or speaking so slowly that other people could have noticed; or the opposite being so fidgety that others notice Not at all   Thoughts of being better off dead, or hurting yourself in some way Not at all   PHQ 9 Score 12     Fall Risk Assessment:     Fall Risk Assessment, last 12 mths 2/3/2021   Able to walk? Yes   Fall in past 12 months? 0     Abuse Screening:     Abuse Screening Questionnaire 2/3/2021   Do you ever feel afraid of your partner? N   Are you in a relationship with someone who physically or mentally threatens you? N   Is it safe for you to go home?  Y     Coordination of Care Questionaire:   1. Have you been to the ER, urgent care clinic since your last visit? Hospitalized since your last visit? NO    2. Have you seen or consulted any other health care providers outside of the [address] [place] [address] since your last visit? Include any pap smears or colon screening. NO    Advanced Directive:   1. Do you have an Advanced Directive? NO    2. Would you like information on Advanced Directives?  NO
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
## 2021-11-09 LAB — DRUGS UR: NORMAL

## 2021-11-20 DIAGNOSIS — M62.838 MUSCLE SPASM: ICD-10-CM

## 2021-11-23 RX ORDER — BACLOFEN 10 MG/1
TABLET ORAL
Qty: 90 TABLET | Refills: 3 | Status: SHIPPED | OUTPATIENT
Start: 2021-11-23 | End: 2022-03-15 | Stop reason: SDUPTHER

## 2021-12-02 DIAGNOSIS — G89.29 CHRONIC LEFT-SIDED THORACIC BACK PAIN: ICD-10-CM

## 2021-12-02 DIAGNOSIS — M54.6 CHRONIC LEFT-SIDED THORACIC BACK PAIN: ICD-10-CM

## 2021-12-02 DIAGNOSIS — M75.32 CALCIFIC TENDONITIS OF LEFT SHOULDER: ICD-10-CM

## 2021-12-02 RX ORDER — TRAMADOL HYDROCHLORIDE 50 MG/1
50 TABLET ORAL
Qty: 90 TABLET | Refills: 0 | Status: SHIPPED | OUTPATIENT
Start: 2021-12-02 | End: 2022-01-13 | Stop reason: SDUPTHER

## 2021-12-02 NOTE — TELEPHONE ENCOUNTER
Patient request for refill. Last OV 11/04/21. Requested Prescriptions     Pending Prescriptions Disp Refills    traMADoL (ULTRAM) 50 mg tablet 90 Tablet 0     Sig: Take 1 Tablet by mouth three (3) times daily as needed for Pain for up to 30 days. Max Daily Amount: 150 mg.

## 2022-01-10 DIAGNOSIS — M75.32 CALCIFIC TENDONITIS OF LEFT SHOULDER: ICD-10-CM

## 2022-01-10 DIAGNOSIS — M54.6 CHRONIC LEFT-SIDED THORACIC BACK PAIN: ICD-10-CM

## 2022-01-10 DIAGNOSIS — G89.29 CHRONIC LEFT-SIDED THORACIC BACK PAIN: ICD-10-CM

## 2022-01-10 RX ORDER — TRAMADOL HYDROCHLORIDE 50 MG/1
50 TABLET ORAL
Qty: 90 TABLET | Refills: 0 | Status: CANCELLED | OUTPATIENT
Start: 2022-01-10 | End: 2022-02-09

## 2022-01-10 NOTE — TELEPHONE ENCOUNTER
Patient requesting refill    Requested Prescriptions     Pending Prescriptions Disp Refills    traMADoL (ULTRAM) 50 mg tablet 90 Tablet 0     Sig: Take 1 Tablet by mouth three (3) times daily as needed for Pain for up to 30 days. Max Daily Amount: 150 mg.

## 2022-01-13 RX ORDER — TRAMADOL HYDROCHLORIDE 50 MG/1
50 TABLET ORAL
Qty: 90 TABLET | Refills: 0 | Status: SHIPPED | OUTPATIENT
Start: 2022-01-13 | End: 2022-02-08

## 2022-01-13 NOTE — TELEPHONE ENCOUNTER
Patient is calling in stating she is now out of medication and not sure why it is taking so long to fill this Rx when is was put in on Monday. Requested Prescriptions     Pending Prescriptions Disp Refills    traMADoL (ULTRAM) 50 mg tablet 90 Tablet 0     Sig: Take 1 Tablet by mouth three (3) times daily as needed for Pain for up to 30 days. Max Daily Amount: 150 mg.

## 2022-02-08 DIAGNOSIS — M75.32 CALCIFIC TENDONITIS OF LEFT SHOULDER: ICD-10-CM

## 2022-02-08 DIAGNOSIS — G89.29 CHRONIC LEFT-SIDED THORACIC BACK PAIN: ICD-10-CM

## 2022-02-08 DIAGNOSIS — M54.6 CHRONIC LEFT-SIDED THORACIC BACK PAIN: ICD-10-CM

## 2022-02-08 RX ORDER — TRAMADOL HYDROCHLORIDE 50 MG/1
TABLET ORAL
Qty: 90 TABLET | Refills: 0 | Status: SHIPPED | OUTPATIENT
Start: 2022-02-08 | End: 2022-03-10 | Stop reason: SDUPTHER

## 2022-02-08 NOTE — TELEPHONE ENCOUNTER
Patient is VERY upset, has been out of meds for 6 days now. States she can not just stop her meds. Would stone to speak with the doctor about why this has been denied.   [phone]

## 2022-03-10 DIAGNOSIS — G89.29 CHRONIC LEFT-SIDED THORACIC BACK PAIN: ICD-10-CM

## 2022-03-10 DIAGNOSIS — M75.32 CALCIFIC TENDONITIS OF LEFT SHOULDER: ICD-10-CM

## 2022-03-10 DIAGNOSIS — M54.6 CHRONIC LEFT-SIDED THORACIC BACK PAIN: ICD-10-CM

## 2022-03-11 RX ORDER — TRAMADOL HYDROCHLORIDE 50 MG/1
50 TABLET ORAL
Qty: 90 TABLET | Refills: 0 | Status: SHIPPED | OUTPATIENT
Start: 2022-03-11 | End: 2022-04-10

## 2022-03-15 ENCOUNTER — OFFICE VISIT (OUTPATIENT)
Dept: INTERNAL MEDICINE CLINIC | Age: 67
End: 2022-03-15
Payer: MEDICARE

## 2022-03-15 VITALS
RESPIRATION RATE: 18 BRPM | BODY MASS INDEX: 25.48 KG/M2 | WEIGHT: 143.8 LBS | OXYGEN SATURATION: 97 % | HEIGHT: 63 IN | HEART RATE: 72 BPM | TEMPERATURE: 97.1 F | DIASTOLIC BLOOD PRESSURE: 77 MMHG | SYSTOLIC BLOOD PRESSURE: 133 MMHG

## 2022-03-15 DIAGNOSIS — R09.89 CHEST CONGESTION: ICD-10-CM

## 2022-03-15 DIAGNOSIS — K59.09 CHRONIC CONSTIPATION: ICD-10-CM

## 2022-03-15 DIAGNOSIS — J44.9 CHRONIC OBSTRUCTIVE PULMONARY DISEASE, UNSPECIFIED COPD TYPE (HCC): ICD-10-CM

## 2022-03-15 DIAGNOSIS — M62.838 MUSCLE SPASM: ICD-10-CM

## 2022-03-15 DIAGNOSIS — M75.32 CALCIFIC TENDONITIS OF LEFT SHOULDER: ICD-10-CM

## 2022-03-15 DIAGNOSIS — Z23 ENCOUNTER FOR IMMUNIZATION: Primary | ICD-10-CM

## 2022-03-15 DIAGNOSIS — R73.09 ELEVATED GLUCOSE: ICD-10-CM

## 2022-03-15 DIAGNOSIS — G89.29 CHRONIC LEFT-SIDED THORACIC BACK PAIN: ICD-10-CM

## 2022-03-15 DIAGNOSIS — M54.6 CHRONIC LEFT-SIDED THORACIC BACK PAIN: ICD-10-CM

## 2022-03-15 LAB — HBA1C MFR BLD HPLC: 5.2 %

## 2022-03-15 PROCEDURE — G9899 SCRN MAM PERF RSLTS DOC: HCPCS | Performed by: INTERNAL MEDICINE

## 2022-03-15 PROCEDURE — G8419 CALC BMI OUT NRM PARAM NOF/U: HCPCS | Performed by: INTERNAL MEDICINE

## 2022-03-15 PROCEDURE — 90750 HZV VACC RECOMBINANT IM: CPT | Performed by: INTERNAL MEDICINE

## 2022-03-15 PROCEDURE — 90471 IMMUNIZATION ADMIN: CPT | Performed by: INTERNAL MEDICINE

## 2022-03-15 PROCEDURE — 3017F COLORECTAL CA SCREEN DOC REV: CPT | Performed by: INTERNAL MEDICINE

## 2022-03-15 PROCEDURE — 83036 HEMOGLOBIN GLYCOSYLATED A1C: CPT | Performed by: INTERNAL MEDICINE

## 2022-03-15 PROCEDURE — G8427 DOCREV CUR MEDS BY ELIG CLIN: HCPCS | Performed by: INTERNAL MEDICINE

## 2022-03-15 PROCEDURE — G8432 DEP SCR NOT DOC, RNG: HCPCS | Performed by: INTERNAL MEDICINE

## 2022-03-15 PROCEDURE — 99215 OFFICE O/P EST HI 40 MIN: CPT | Performed by: INTERNAL MEDICINE

## 2022-03-15 RX ORDER — FEXOFENADINE HCL 30 MG/5 ML
SUSPENSION, ORAL (FINAL DOSE FORM) ORAL
Qty: 1 EACH | Refills: 1 | Status: SHIPPED | OUTPATIENT
Start: 2022-03-15

## 2022-03-15 RX ORDER — TRAMADOL HYDROCHLORIDE 50 MG/1
50 TABLET ORAL
Qty: 90 TABLET | Refills: 0 | Status: CANCELLED | OUTPATIENT
Start: 2022-03-15 | End: 2022-04-14

## 2022-03-15 RX ORDER — ALBUTEROL SULFATE 0.83 MG/ML
SOLUTION RESPIRATORY (INHALATION)
Qty: 75 ML | Refills: 3 | Status: SHIPPED | OUTPATIENT
Start: 2022-03-15 | End: 2022-07-12 | Stop reason: CLARIF

## 2022-03-15 RX ORDER — BACLOFEN 10 MG/1
TABLET ORAL
Qty: 90 TABLET | Refills: 3 | Status: SHIPPED | OUTPATIENT
Start: 2022-03-15 | End: 2022-09-07 | Stop reason: SDUPTHER

## 2022-03-15 NOTE — PROGRESS NOTES
1. \"Have you been to the ER, urgent care clinic since your last visit? Hospitalized since your last visit? \" Yes, 02/13/2022 [facility], tested for covid testing, positive result     2. \"Have you seen or consulted any other health care providers outside of the [address] [place] [address] since your last visit? \" No     3. For patients aged 39-70: Has the patient had a colonoscopy / FIT/ Cologuard? Yes - no Care Gap present      If the patient is female:    4. For patients aged 41-77: Has the patient had a mammogram within the past 2 years? Yes - no Care Gap present      5. For patients aged 21-65: Has the patient had a pap smear?  No

## 2022-03-16 DIAGNOSIS — K59.00 CONSTIPATION, UNSPECIFIED CONSTIPATION TYPE: ICD-10-CM

## 2022-03-17 RX ORDER — DOCUSATE SODIUM 100 MG/1
CAPSULE, LIQUID FILLED ORAL
Qty: 180 CAPSULE | Refills: 3 | Status: SHIPPED | OUTPATIENT
Start: 2022-03-17

## 2022-03-19 PROBLEM — Z71.89 ADVANCE DIRECTIVE DISCUSSED WITH PATIENT: Status: ACTIVE | Noted: 2017-05-11

## 2022-03-30 DIAGNOSIS — J44.9 CHRONIC OBSTRUCTIVE PULMONARY DISEASE, UNSPECIFIED COPD TYPE (HCC): ICD-10-CM

## 2022-03-30 RX ORDER — FLUTICASONE FUROATE, UMECLIDINIUM BROMIDE AND VILANTEROL TRIFENATATE 200; 62.5; 25 UG/1; UG/1; UG/1
1 POWDER RESPIRATORY (INHALATION) DAILY
Qty: 60 EACH | Refills: 3 | Status: SHIPPED | OUTPATIENT
Start: 2022-03-30

## 2022-03-30 RX ORDER — ALBUTEROL SULFATE 90 UG/1
AEROSOL, METERED RESPIRATORY (INHALATION)
Qty: 8.5 G | Refills: 1 | Status: SHIPPED | OUTPATIENT
Start: 2022-03-30 | End: 2022-06-09 | Stop reason: SDUPTHER

## 2022-03-30 NOTE — TELEPHONE ENCOUNTER
Patient requesting refill    Requested Prescriptions     Pending Prescriptions Disp Refills    fluticasone-umeclidin-vilanter (Trelegy Ellipta) 200-62.5-25 mcg inhaler       Sig: Take 1 Puff by inhalation daily.     albuterol (PROVENTIL HFA, VENTOLIN HFA, PROAIR HFA) 90 mcg/actuation inhaler 8.5 g 1

## 2022-04-05 DIAGNOSIS — Z76.0 MEDICATION REFILL: ICD-10-CM

## 2022-04-07 RX ORDER — GUAIFENESIN 100 MG/5ML
LIQUID (ML) ORAL
Qty: 90 TABLET | Refills: 3 | Status: SHIPPED | OUTPATIENT
Start: 2022-04-07

## 2022-04-10 DIAGNOSIS — G89.29 CHRONIC LEFT-SIDED THORACIC BACK PAIN: ICD-10-CM

## 2022-04-10 DIAGNOSIS — M75.32 CALCIFIC TENDONITIS OF LEFT SHOULDER: ICD-10-CM

## 2022-04-10 DIAGNOSIS — M54.6 CHRONIC LEFT-SIDED THORACIC BACK PAIN: ICD-10-CM

## 2022-04-10 RX ORDER — TRAMADOL HYDROCHLORIDE 50 MG/1
TABLET ORAL
Qty: 90 TABLET | Refills: 0 | Status: SHIPPED | OUTPATIENT
Start: 2022-04-10 | End: 2022-05-08 | Stop reason: SDUPTHER

## 2022-04-11 DIAGNOSIS — K21.9 GASTROESOPHAGEAL REFLUX DISEASE WITHOUT ESOPHAGITIS: ICD-10-CM

## 2022-04-11 DIAGNOSIS — Z76.0 MEDICATION REFILL: ICD-10-CM

## 2022-04-12 RX ORDER — TRAZODONE HYDROCHLORIDE 50 MG/1
TABLET ORAL
Qty: 180 TABLET | Refills: 1 | Status: SHIPPED | OUTPATIENT
Start: 2022-04-12 | End: 2022-08-07

## 2022-04-12 RX ORDER — OMEPRAZOLE 20 MG/1
CAPSULE, DELAYED RELEASE ORAL
Qty: 90 CAPSULE | Refills: 1 | Status: SHIPPED | OUTPATIENT
Start: 2022-04-12 | End: 2022-09-29

## 2022-04-12 RX ORDER — SIMVASTATIN 10 MG/1
TABLET, FILM COATED ORAL
Qty: 90 TABLET | Refills: 1 | Status: SHIPPED | OUTPATIENT
Start: 2022-04-12 | End: 2022-09-29

## 2022-05-02 ENCOUNTER — HOSPITAL ENCOUNTER (OUTPATIENT)
Dept: WOMENS IMAGING | Age: 67
Discharge: HOME | End: 2022-05-02
Attending: INTERNAL MEDICINE
Payer: COMMERCIAL

## 2022-05-02 DIAGNOSIS — Z12.31 ENCOUNTER FOR SCREENING MAMMOGRAM FOR BREAST CANCER: ICD-10-CM

## 2022-05-02 PROCEDURE — 77063 BREAST TOMOSYNTHESIS BI: CPT

## 2022-05-08 DIAGNOSIS — G89.29 CHRONIC LEFT-SIDED THORACIC BACK PAIN: ICD-10-CM

## 2022-05-08 DIAGNOSIS — M75.32 CALCIFIC TENDONITIS OF LEFT SHOULDER: ICD-10-CM

## 2022-05-08 DIAGNOSIS — M54.6 CHRONIC LEFT-SIDED THORACIC BACK PAIN: ICD-10-CM

## 2022-05-08 RX ORDER — TRAMADOL HYDROCHLORIDE 50 MG/1
50 TABLET ORAL
Qty: 90 TABLET | Refills: 0 | Status: SHIPPED | OUTPATIENT
Start: 2022-05-08 | End: 2022-06-05

## 2022-06-05 DIAGNOSIS — G89.29 CHRONIC LEFT-SIDED THORACIC BACK PAIN: ICD-10-CM

## 2022-06-05 DIAGNOSIS — M54.6 CHRONIC LEFT-SIDED THORACIC BACK PAIN: ICD-10-CM

## 2022-06-05 DIAGNOSIS — M75.32 CALCIFIC TENDONITIS OF LEFT SHOULDER: ICD-10-CM

## 2022-06-05 RX ORDER — TRAMADOL HYDROCHLORIDE 50 MG/1
TABLET ORAL
Qty: 90 TABLET | Refills: 0 | Status: SHIPPED | OUTPATIENT
Start: 2022-06-05 | End: 2022-07-05

## 2022-06-09 ENCOUNTER — OFFICE VISIT (OUTPATIENT)
Dept: INTERNAL MEDICINE CLINIC | Age: 67
End: 2022-06-09
Payer: COMMERCIAL

## 2022-06-09 VITALS
TEMPERATURE: 96.8 F | HEART RATE: 72 BPM | OXYGEN SATURATION: 93 % | HEIGHT: 63 IN | DIASTOLIC BLOOD PRESSURE: 86 MMHG | SYSTOLIC BLOOD PRESSURE: 133 MMHG | WEIGHT: 150 LBS | BODY MASS INDEX: 26.58 KG/M2

## 2022-06-09 DIAGNOSIS — M54.6 ACUTE MIDLINE THORACIC BACK PAIN: ICD-10-CM

## 2022-06-09 DIAGNOSIS — F17.210 SMOKING 1/2 PACK A DAY OR LESS: ICD-10-CM

## 2022-06-09 DIAGNOSIS — J44.9 CHRONIC OBSTRUCTIVE PULMONARY DISEASE, UNSPECIFIED COPD TYPE (HCC): ICD-10-CM

## 2022-06-09 DIAGNOSIS — J40 BRONCHITIS: Primary | ICD-10-CM

## 2022-06-09 PROCEDURE — 99213 OFFICE O/P EST LOW 20 MIN: CPT | Performed by: INTERNAL MEDICINE

## 2022-06-09 RX ORDER — VARENICLINE TARTRATE 0.5 MG/1
0.5 TABLET, FILM COATED ORAL 2 TIMES DAILY
Qty: 30 TABLET | Refills: 1 | Status: SHIPPED | OUTPATIENT
Start: 2022-06-09 | End: 2022-07-06

## 2022-06-09 RX ORDER — CLONIDINE HYDROCHLORIDE 0.2 MG/1
0.2 TABLET ORAL 2 TIMES DAILY
COMMUNITY

## 2022-06-09 RX ORDER — ALBUTEROL SULFATE 90 UG/1
AEROSOL, METERED RESPIRATORY (INHALATION)
Qty: 8.5 G | Refills: 1 | Status: SHIPPED | OUTPATIENT
Start: 2022-06-09 | End: 2022-07-12 | Stop reason: CLARIF

## 2022-06-09 NOTE — PROGRESS NOTES
Joe Meyer is a 61 y.o. female (: 1955) presenting to address:    Chief Complaint   Patient presents with    Follow-up       Vitals:    22 0929   Temp: 96.8 °F (36 °C)   Weight: 150 lb (68 kg)   Height: 5' 3\" (1.6 m)   PainSc:   0 - No pain       Hearing/Vision:   No exam data present    Learning Assessment:     Learning Assessment 2/3/2021   PRIMARY LEARNER Patient   HIGHEST LEVEL OF EDUCATION - PRIMARY LEARNER  DID NOT GRADUATE HIGH SCHOOL   BARRIERS PRIMARY LEARNER NONE   CO-LEARNER CAREGIVER No   PRIMARY LANGUAGE ENGLISH   LEARNER PREFERENCE PRIMARY OTHER (COMMENT)   ANSWERED BY patient    RELATIONSHIP SELF     Depression Screening:     3 most recent PHQ Screens 2022   Little interest or pleasure in doing things Not at all   Feeling down, depressed, irritable, or hopeless Not at all   Total Score PHQ 2 0   Trouble falling or staying asleep, or sleeping too much -   Feeling tired or having little energy -   Poor appetite, weight loss, or overeating -   Feeling bad about yourself - or that you are a failure or have let yourself or your family down -   Trouble concentrating on things such as school, work, reading, or watching TV -   Moving or speaking so slowly that other people could have noticed; or the opposite being so fidgety that others notice -   Thoughts of being better off dead, or hurting yourself in some way -   PHQ 9 Score -     Fall Risk Assessment:     Fall Risk Assessment, last 12 mths 2/3/2021   Able to walk? Yes   Fall in past 12 months? 0     Abuse Screening:     Abuse Screening Questionnaire 2/3/2021   Do you ever feel afraid of your partner? N   Are you in a relationship with someone who physically or mentally threatens you? N   Is it safe for you to go home?  Y     ADL Assessment:     ADL Assessment 2/3/2021   Feeding yourself No Help Needed   Getting from bed to chair No Help Needed   Getting dressed No Help Needed   Bathing or showering No Help Needed   Walk across the room (includes cane/walker) No Help Needed   Using the telphone No Help Needed   Taking your medications No Help Needed   Preparing meals No Help Needed   Managing money (expenses/bills) No Help Needed   Moderately strenuous housework (laundry) No Help Needed   Shopping for personal items (toiletries/medicines) No Help Needed   Shopping for groceries No Help Needed   Driving No Help Needed   Climbing a flight of stairs No Help Needed   Getting to places beyond walking distances No Help Needed        Coordination of Care Questionaire:   1. \"Have you been to the ER, urgent care clinic since your last visit? Hospitalized since your last visit? \" No    2. \"Have you seen or consulted any other health care providers outside of the [address] [place] [address] [place] since your last visit? \" No     3. For patients aged 39-70: Has the patient had a colonoscopy / FIT/ Cologuard? Yes - no Care Gap present      If the patient is female:    4. For patients aged 41-77: Has the patient had a mammogram within the past 2 years? Yes - no Care Gap present  See top three    5. For patients aged 21-65: Has the patient had a pap smear? No    Advanced Directive:   1. Do you have an Advanced Directive? NO    2. Would you like information on Advanced Directives?  NO
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
## 2022-07-05 DIAGNOSIS — M75.32 CALCIFIC TENDONITIS OF LEFT SHOULDER: ICD-10-CM

## 2022-07-05 DIAGNOSIS — G89.29 CHRONIC LEFT-SIDED THORACIC BACK PAIN: ICD-10-CM

## 2022-07-05 DIAGNOSIS — M54.6 CHRONIC LEFT-SIDED THORACIC BACK PAIN: ICD-10-CM

## 2022-07-05 DIAGNOSIS — F17.210 SMOKING 1/2 PACK A DAY OR LESS: ICD-10-CM

## 2022-07-05 RX ORDER — TRAMADOL HYDROCHLORIDE 50 MG/1
TABLET ORAL
Qty: 90 TABLET | Refills: 0 | Status: SHIPPED | OUTPATIENT
Start: 2022-07-05 | End: 2022-08-03

## 2022-07-06 DIAGNOSIS — K59.09 CHRONIC CONSTIPATION: ICD-10-CM

## 2022-07-06 RX ORDER — LINACLOTIDE 145 UG/1
CAPSULE, GELATIN COATED ORAL
Qty: 90 CAPSULE | Refills: 1 | Status: SHIPPED | OUTPATIENT
Start: 2022-07-06

## 2022-07-06 RX ORDER — VARENICLINE TARTRATE 0.5 MG/1
TABLET, FILM COATED ORAL
Qty: 30 TABLET | Refills: 1 | Status: SHIPPED | OUTPATIENT
Start: 2022-07-06 | End: 2022-08-03

## 2022-07-12 DIAGNOSIS — J44.9 CHRONIC OBSTRUCTIVE PULMONARY DISEASE, UNSPECIFIED COPD TYPE (HCC): ICD-10-CM

## 2022-07-12 RX ORDER — ALBUTEROL SULFATE 90 UG/1
AEROSOL, METERED RESPIRATORY (INHALATION)
Qty: 8.5 G | Refills: 1 | Status: SHIPPED | OUTPATIENT
Start: 2022-07-12

## 2022-08-03 DIAGNOSIS — F17.210 SMOKING 1/2 PACK A DAY OR LESS: ICD-10-CM

## 2022-08-03 DIAGNOSIS — M75.32 CALCIFIC TENDONITIS OF LEFT SHOULDER: ICD-10-CM

## 2022-08-03 DIAGNOSIS — M54.6 CHRONIC LEFT-SIDED THORACIC BACK PAIN: ICD-10-CM

## 2022-08-03 DIAGNOSIS — G89.29 CHRONIC LEFT-SIDED THORACIC BACK PAIN: ICD-10-CM

## 2022-08-03 RX ORDER — TRAMADOL HYDROCHLORIDE 50 MG/1
TABLET ORAL
Qty: 90 TABLET | Refills: 0 | Status: SHIPPED | OUTPATIENT
Start: 2022-08-03 | End: 2022-09-01

## 2022-08-03 RX ORDER — VARENICLINE TARTRATE 0.5 MG/1
TABLET, FILM COATED ORAL
Qty: 30 TABLET | Refills: 1 | Status: SHIPPED | OUTPATIENT
Start: 2022-08-03 | End: 2022-10-04 | Stop reason: SINTOL

## 2022-08-07 DIAGNOSIS — Z76.0 MEDICATION REFILL: ICD-10-CM

## 2022-08-07 RX ORDER — TRAZODONE HYDROCHLORIDE 50 MG/1
TABLET ORAL
Qty: 180 TABLET | Refills: 1 | Status: SHIPPED | OUTPATIENT
Start: 2022-08-07

## 2022-09-01 DIAGNOSIS — M54.6 CHRONIC LEFT-SIDED THORACIC BACK PAIN: ICD-10-CM

## 2022-09-01 DIAGNOSIS — M75.32 CALCIFIC TENDONITIS OF LEFT SHOULDER: ICD-10-CM

## 2022-09-01 DIAGNOSIS — G89.29 CHRONIC LEFT-SIDED THORACIC BACK PAIN: ICD-10-CM

## 2022-09-01 RX ORDER — TRAMADOL HYDROCHLORIDE 50 MG/1
TABLET ORAL
Qty: 90 TABLET | Refills: 0 | Status: SHIPPED | OUTPATIENT
Start: 2022-09-01 | End: 2022-09-28

## 2022-09-07 DIAGNOSIS — M62.838 MUSCLE SPASM: ICD-10-CM

## 2022-09-07 NOTE — TELEPHONE ENCOUNTER
Patient request for refill. Last OV  6/9/22, no future appts scheduled.     Requested Prescriptions     Pending Prescriptions Disp Refills    baclofen (LIORESAL) 10 mg tablet 90 Tablet 3     Sig: Use tid  Shoulder pain

## 2022-09-08 RX ORDER — BACLOFEN 10 MG/1
10 TABLET ORAL 3 TIMES DAILY
Qty: 90 TABLET | Refills: 3 | Status: SHIPPED | OUTPATIENT
Start: 2022-09-08

## 2022-09-21 DIAGNOSIS — M75.32 CALCIFIC TENDONITIS OF LEFT SHOULDER: ICD-10-CM

## 2022-09-21 DIAGNOSIS — M54.6 CHRONIC LEFT-SIDED THORACIC BACK PAIN: ICD-10-CM

## 2022-09-21 DIAGNOSIS — G89.29 CHRONIC LEFT-SIDED THORACIC BACK PAIN: ICD-10-CM

## 2022-09-21 RX ORDER — TRAMADOL HYDROCHLORIDE 50 MG/1
TABLET ORAL
Qty: 90 TABLET | Refills: 0 | OUTPATIENT
Start: 2022-09-21 | End: 2022-10-21

## 2022-09-28 DIAGNOSIS — M54.6 CHRONIC LEFT-SIDED THORACIC BACK PAIN: ICD-10-CM

## 2022-09-28 DIAGNOSIS — M75.32 CALCIFIC TENDONITIS OF LEFT SHOULDER: ICD-10-CM

## 2022-09-28 DIAGNOSIS — G89.29 CHRONIC LEFT-SIDED THORACIC BACK PAIN: ICD-10-CM

## 2022-09-28 RX ORDER — TRAMADOL HYDROCHLORIDE 50 MG/1
TABLET ORAL
Qty: 90 TABLET | Refills: 0 | Status: SHIPPED | OUTPATIENT
Start: 2022-09-28 | End: 2022-10-24

## 2022-09-29 DIAGNOSIS — K21.9 GASTROESOPHAGEAL REFLUX DISEASE WITHOUT ESOPHAGITIS: ICD-10-CM

## 2022-09-29 DIAGNOSIS — Z76.0 MEDICATION REFILL: ICD-10-CM

## 2022-09-29 RX ORDER — SIMVASTATIN 10 MG/1
TABLET, FILM COATED ORAL
Qty: 90 TABLET | Refills: 1 | Status: SHIPPED | OUTPATIENT
Start: 2022-09-29

## 2022-09-29 RX ORDER — OMEPRAZOLE 20 MG/1
CAPSULE, DELAYED RELEASE ORAL
Qty: 90 CAPSULE | Refills: 1 | Status: SHIPPED | OUTPATIENT
Start: 2022-09-29

## 2022-10-04 ENCOUNTER — OFFICE VISIT (OUTPATIENT)
Dept: INTERNAL MEDICINE CLINIC | Age: 67
End: 2022-10-04
Payer: COMMERCIAL

## 2022-10-04 ENCOUNTER — HOSPITAL ENCOUNTER (OUTPATIENT)
Dept: LAB | Age: 67
Discharge: HOME | End: 2022-10-04
Payer: COMMERCIAL

## 2022-10-04 VITALS
SYSTOLIC BLOOD PRESSURE: 145 MMHG | HEART RATE: 74 BPM | DIASTOLIC BLOOD PRESSURE: 86 MMHG | WEIGHT: 144.4 LBS | HEIGHT: 63 IN | OXYGEN SATURATION: 95 % | TEMPERATURE: 97 F | BODY MASS INDEX: 25.59 KG/M2

## 2022-10-04 DIAGNOSIS — R73.9 ELEVATED BLOOD SUGAR: ICD-10-CM

## 2022-10-04 DIAGNOSIS — M17.0 PRIMARY OSTEOARTHRITIS OF BOTH KNEES: ICD-10-CM

## 2022-10-04 DIAGNOSIS — G89.29 OTHER CHRONIC PAIN: ICD-10-CM

## 2022-10-04 DIAGNOSIS — I10 PRIMARY HYPERTENSION: ICD-10-CM

## 2022-10-04 DIAGNOSIS — J40 BRONCHITIS: ICD-10-CM

## 2022-10-04 DIAGNOSIS — E78.5 HYPERLIPIDEMIA, UNSPECIFIED HYPERLIPIDEMIA TYPE: ICD-10-CM

## 2022-10-04 DIAGNOSIS — F17.210 CIGARETTE SMOKER: ICD-10-CM

## 2022-10-04 DIAGNOSIS — Z79.899 ENCOUNTER FOR LONG-TERM (CURRENT) USE OF OTHER MEDICATIONS: Primary | ICD-10-CM

## 2022-10-04 LAB
ANION GAP SERPL CALC-SCNC: 7 MMOL/L (ref 3–18)
BUN SERPL-MCNC: 11 MG/DL (ref 7–18)
BUN/CREAT SERPL: 14 (ref 12–20)
CALCIUM SERPL-MCNC: 9.6 MG/DL (ref 8.5–10.1)
CHLORIDE SERPL-SCNC: 107 MMOL/L (ref 100–111)
CHOLEST SERPL-MCNC: 173 MG/DL
CO2 SERPL-SCNC: 29 MMOL/L (ref 21–32)
CREAT SERPL-MCNC: 0.8 MG/DL (ref 0.6–1.3)
EST. AVERAGE GLUCOSE BLD GHB EST-MCNC: 126 MG/DL
GLUCOSE SERPL-MCNC: 83 MG/DL (ref 74–99)
HBA1C MFR BLD: 6 % (ref 4.2–5.6)
HDLC SERPL-MCNC: 68 MG/DL (ref 40–60)
HDLC SERPL: 2.5 {RATIO} (ref 0–5)
LDLC SERPL CALC-MCNC: 77.2 MG/DL (ref 0–100)
LIPID PROFILE,FLP: ABNORMAL
POTASSIUM SERPL-SCNC: 4.1 MMOL/L (ref 3.5–5.5)
SODIUM SERPL-SCNC: 143 MMOL/L (ref 136–145)
TRIGL SERPL-MCNC: 139 MG/DL (ref ?–150)
VLDLC SERPL CALC-MCNC: 27.8 MG/DL

## 2022-10-04 PROCEDURE — G0480 DRUG TEST DEF 1-7 CLASSES: HCPCS

## 2022-10-04 PROCEDURE — 80061 LIPID PANEL: CPT

## 2022-10-04 PROCEDURE — 1123F ACP DISCUSS/DSCN MKR DOCD: CPT | Performed by: INTERNAL MEDICINE

## 2022-10-04 PROCEDURE — 36415 COLL VENOUS BLD VENIPUNCTURE: CPT

## 2022-10-04 PROCEDURE — 80048 BASIC METABOLIC PNL TOTAL CA: CPT

## 2022-10-04 PROCEDURE — 99214 OFFICE O/P EST MOD 30 MIN: CPT | Performed by: INTERNAL MEDICINE

## 2022-10-04 PROCEDURE — 83036 HEMOGLOBIN GLYCOSYLATED A1C: CPT

## 2022-10-04 RX ORDER — AZITHROMYCIN 250 MG/1
TABLET, FILM COATED ORAL
Qty: 6 TABLET | Refills: 0 | Status: SHIPPED | OUTPATIENT
Start: 2022-10-04 | End: 2022-10-09

## 2022-10-04 RX ORDER — NICOTINE 7MG/24HR
1 PATCH, TRANSDERMAL 24 HOURS TRANSDERMAL EVERY 24 HOURS
Qty: 30 PATCH | Refills: 0 | Status: SHIPPED | OUTPATIENT
Start: 2022-10-04 | End: 2022-11-03

## 2022-10-04 RX ORDER — MONTELUKAST SODIUM 10 MG/1
TABLET ORAL
Qty: 90 TABLET | Refills: 3 | Status: SHIPPED | OUTPATIENT
Start: 2022-10-04
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
## 2022-10-04 NOTE — PROGRESS NOTES
Pawel Bae is a 74 y.o. female (: 1955) presenting to address:    Chief Complaint   Patient presents with    Knee Pain       Vitals:    10/04/22 1019   Temp: 97 °F (36.1 °C)   Weight: 144 lb 6.4 oz (65.5 kg)   Height: 5' 3\" (1.6 m)   PainSc:   0 - No pain       Hearing/Vision:   No results found. Learning Assessment:     Learning Assessment 2/3/2021   PRIMARY LEARNER Patient   HIGHEST LEVEL OF EDUCATION - PRIMARY LEARNER  DID NOT GRADUATE HIGH SCHOOL   BARRIERS PRIMARY LEARNER NONE   CO-LEARNER CAREGIVER No   PRIMARY LANGUAGE ENGLISH   LEARNER PREFERENCE PRIMARY OTHER (COMMENT)   ANSWERED BY patient    RELATIONSHIP SELF     Depression Screening:     3 most recent PHQ Screens 10/4/2022   PHQ Not Done Functional capacity motivation limits accuracy   Little interest or pleasure in doing things Not at all   Feeling down, depressed, irritable, or hopeless Not at all   Total Score PHQ 2 0   Trouble falling or staying asleep, or sleeping too much -   Feeling tired or having little energy -   Poor appetite, weight loss, or overeating -   Feeling bad about yourself - or that you are a failure or have let yourself or your family down -   Trouble concentrating on things such as school, work, reading, or watching TV -   Moving or speaking so slowly that other people could have noticed; or the opposite being so fidgety that others notice -   Thoughts of being better off dead, or hurting yourself in some way -   PHQ 9 Score -     Fall Risk Assessment:     Fall Risk Assessment, last 12 mths 10/4/2022   Able to walk? Yes   Fall in past 12 months? 0   Do you feel unsteady? 0   Are you worried about falling 0     Abuse Screening:     Abuse Screening Questionnaire 2/3/2021   Do you ever feel afraid of your partner? N   Are you in a relationship with someone who physically or mentally threatens you? N   Is it safe for you to go home?  Y     ADL Assessment:     ADL Assessment 2/3/2021   Feeding yourself No Help Needed   Getting from bed to chair No Help Needed   Getting dressed No Help Needed   Bathing or showering No Help Needed   Walk across the room (includes cane/walker) No Help Needed   Using the telphone No Help Needed   Taking your medications No Help Needed   Preparing meals No Help Needed   Managing money (expenses/bills) No Help Needed   Moderately strenuous housework (laundry) No Help Needed   Shopping for personal items (toiletries/medicines) No Help Needed   Shopping for groceries No Help Needed   Driving No Help Needed   Climbing a flight of stairs No Help Needed   Getting to places beyond walking distances No Help Needed        Coordination of Care Questionaire:   1. \"Have you been to the ER, urgent care clinic since your last visit? Hospitalized since your last visit? \" No    2. \"Have you seen or consulted any other health care providers outside of the [address] [place] [address] [place] since your last visit? \" No     3. For patients aged 39-70: Has the patient had a colonoscopy / FIT/ Cologuard? Yes - no Care Gap present      If the patient is female:    4. For patients aged 41-77: Has the patient had a mammogram within the past 2 years? Yes - no Care Gap present  See top three    5. For patients aged 21-65: Has the patient had a pap smear? No    Advanced Directive:   1. Do you have an Advanced Directive? NO    2. Would you like information on Advanced Directives?  NO

## 2022-10-11 LAB — DRUGS UR: NORMAL

## 2022-10-24 DIAGNOSIS — M54.6 CHRONIC LEFT-SIDED THORACIC BACK PAIN: ICD-10-CM

## 2022-10-24 DIAGNOSIS — M75.32 CALCIFIC TENDONITIS OF LEFT SHOULDER: ICD-10-CM

## 2022-10-24 DIAGNOSIS — G89.29 CHRONIC LEFT-SIDED THORACIC BACK PAIN: ICD-10-CM

## 2022-10-24 RX ORDER — TRAMADOL HYDROCHLORIDE 50 MG/1
TABLET ORAL
Qty: 90 TABLET | Refills: 0 | Status: SHIPPED | OUTPATIENT
Start: 2022-10-24 | End: 2022-11-18 | Stop reason: SDUPTHER

## 2022-11-18 DIAGNOSIS — M75.32 CALCIFIC TENDONITIS OF LEFT SHOULDER: ICD-10-CM

## 2022-11-18 DIAGNOSIS — M54.6 CHRONIC LEFT-SIDED THORACIC BACK PAIN: ICD-10-CM

## 2022-11-18 DIAGNOSIS — G89.29 CHRONIC LEFT-SIDED THORACIC BACK PAIN: ICD-10-CM

## 2022-11-18 NOTE — TELEPHONE ENCOUNTER
Patient request for refill. Last OV 10/4/22, no future appts scheduled.     Requested Prescriptions     Pending Prescriptions Disp Refills    traMADoL (ULTRAM) 50 mg tablet 90 Tablet 0

## 2022-11-21 RX ORDER — TRAMADOL HYDROCHLORIDE 50 MG/1
50 TABLET ORAL
Qty: 90 TABLET | Refills: 0 | Status: SHIPPED | OUTPATIENT
Start: 2022-11-21 | End: 2022-12-21

## 2022-12-27 DIAGNOSIS — G89.29 CHRONIC LEFT-SIDED THORACIC BACK PAIN: ICD-10-CM

## 2022-12-27 DIAGNOSIS — M75.32 CALCIFIC TENDONITIS OF LEFT SHOULDER: ICD-10-CM

## 2022-12-27 DIAGNOSIS — M54.6 CHRONIC LEFT-SIDED THORACIC BACK PAIN: ICD-10-CM

## 2022-12-27 RX ORDER — TRAMADOL HYDROCHLORIDE 50 MG/1
50 TABLET ORAL
Qty: 90 TABLET | Refills: 0 | Status: SHIPPED | OUTPATIENT
Start: 2022-12-27 | End: 2023-01-26

## 2023-01-08 DIAGNOSIS — K59.09 CHRONIC CONSTIPATION: ICD-10-CM

## 2023-01-08 RX ORDER — LINACLOTIDE 145 UG/1
CAPSULE, GELATIN COATED ORAL
Qty: 90 CAPSULE | Refills: 1 | Status: SHIPPED | OUTPATIENT
Start: 2023-01-08

## 2023-01-22 DIAGNOSIS — G89.29 CHRONIC LEFT-SIDED THORACIC BACK PAIN: ICD-10-CM

## 2023-01-22 DIAGNOSIS — M54.6 CHRONIC LEFT-SIDED THORACIC BACK PAIN: ICD-10-CM

## 2023-01-22 DIAGNOSIS — M75.32 CALCIFIC TENDONITIS OF LEFT SHOULDER: ICD-10-CM

## 2023-01-22 RX ORDER — TRAMADOL HYDROCHLORIDE 50 MG/1
TABLET ORAL
Qty: 90 TABLET | Refills: 0 | Status: SHIPPED | OUTPATIENT
Start: 2023-01-22 | End: 2023-02-21

## 2023-01-25 DIAGNOSIS — J44.9 CHRONIC OBSTRUCTIVE PULMONARY DISEASE, UNSPECIFIED COPD TYPE (HCC): ICD-10-CM

## 2023-01-25 NOTE — TELEPHONE ENCOUNTER
----- Message from Shilpa Finleysusanne sent at 1/19/2023 10:36 AM EST -----  Subject: Refill Request    QUESTIONS  Name of Medication? traMADoL (ULTRAM) 50 mg tablet  Patient-reported dosage and instructions? 50 mg 4 xdaily  How many days do you have left? 0  Preferred Pharmacy? [facility]  Pharmacy phone number (if available)? [phone]  ---------------------------------------------------------------------------  --------------,  Name of Medication? fluticasone-umeclidin-vilanter (Trelegy Ellipta)   200-62.5-25 mcg inhaler  Patient-reported dosage and instructions? 200 mcg 1 x daily  How many days do you have left? 0  Preferred Pharmacy? [address]  Pharmacy phone number (if available)? [phone]  ---------------------------------------------------------------------------  --------------,  Name of Medication? albuterol (PROVENTIL HFA, VENTOLIN HFA, PROAIR HFA) 90   mcg/actuation inhaler  Patient-reported dosage and instructions? 2 X daily   How many days do you have left? 0  Preferred Pharmacy? [facility]  Pharmacy phone number (if available)? [phone]  ---------------------------------------------------------------------------  --------------,  Name of Medication? Symbicort 160-4.5 mcg/actuation HFAA  Patient-reported dosage and instructions? 1x daily   How many days do you have left? 0  Preferred Pharmacy? [address] [facility]  Pharmacy phone number (if available)? [phone]  Additional Information for Provider? Patient is requesting refills until   appointment on 01/26/2023  ---------------------------------------------------------------------------  --------------  CALL BACK INFO  What is the best way for the office to contact you? OK to leave message on   voicemail  Preferred Call Back Phone Number? [phone]  ---------------------------------------------------------------------------  --------------  SCRIPT ANSWERS  Relationship to Patient?  Self

## 2023-01-26 RX ORDER — ALBUTEROL SULFATE 90 UG/1
AEROSOL, METERED RESPIRATORY (INHALATION)
Qty: 8.5 G | Refills: 1 | Status: SHIPPED | OUTPATIENT
Start: 2023-01-26

## 2023-01-26 RX ORDER — BUDESONIDE AND FORMOTEROL FUMARATE DIHYDRATE 160; 4.5 UG/1; UG/1
AEROSOL RESPIRATORY (INHALATION)
Qty: 30.6 G | Refills: 0 | Status: SHIPPED | OUTPATIENT
Start: 2023-01-26

## 2023-01-26 RX ORDER — FLUTICASONE FUROATE, UMECLIDINIUM BROMIDE AND VILANTEROL TRIFENATATE 200; 62.5; 25 UG/1; UG/1; UG/1
1 POWDER RESPIRATORY (INHALATION) DAILY
Qty: 60 EACH | Refills: 3 | Status: SHIPPED | OUTPATIENT
Start: 2023-01-26

## 2023-02-14 RX ORDER — BACLOFEN 10 MG/1
TABLET ORAL
Qty: 90 TABLET | Refills: 2 | Status: SHIPPED | OUTPATIENT
Start: 2023-02-14

## 2023-02-16 ENCOUNTER — TELEPHONE (OUTPATIENT)
Facility: CLINIC | Age: 68
End: 2023-02-16

## 2023-02-16 ENCOUNTER — OFFICE VISIT (OUTPATIENT)
Facility: CLINIC | Age: 68
End: 2023-02-16

## 2023-02-16 VITALS
TEMPERATURE: 98.2 F | OXYGEN SATURATION: 96 % | DIASTOLIC BLOOD PRESSURE: 90 MMHG | RESPIRATION RATE: 18 BRPM | HEIGHT: 63 IN | WEIGHT: 139.4 LBS | HEART RATE: 67 BPM | SYSTOLIC BLOOD PRESSURE: 146 MMHG | BODY MASS INDEX: 24.7 KG/M2

## 2023-02-16 DIAGNOSIS — M51.36 DDD (DEGENERATIVE DISC DISEASE), LUMBAR: ICD-10-CM

## 2023-02-16 DIAGNOSIS — Z01.00 EYE EXAM, ROUTINE: ICD-10-CM

## 2023-02-16 DIAGNOSIS — Z00.00 MEDICARE ANNUAL WELLNESS VISIT, SUBSEQUENT: Primary | ICD-10-CM

## 2023-02-16 DIAGNOSIS — M77.8 TENDINITIS OF SHOULDER, UNSPECIFIED LATERALITY: ICD-10-CM

## 2023-02-16 RX ORDER — TRAMADOL HYDROCHLORIDE 50 MG/1
50 TABLET ORAL 2 TIMES DAILY
Qty: 60 TABLET | Refills: 0 | Status: SHIPPED | OUTPATIENT
Start: 2023-02-16 | End: 2023-03-18

## 2023-02-16 RX ORDER — TRAMADOL HYDROCHLORIDE 50 MG/1
TABLET ORAL
COMMUNITY
Start: 2023-01-22 | End: 2023-02-16 | Stop reason: SDUPTHER

## 2023-02-16 SDOH — ECONOMIC STABILITY: FOOD INSECURITY: WITHIN THE PAST 12 MONTHS, YOU WORRIED THAT YOUR FOOD WOULD RUN OUT BEFORE YOU GOT MONEY TO BUY MORE.: NEVER TRUE

## 2023-02-16 SDOH — ECONOMIC STABILITY: FOOD INSECURITY: WITHIN THE PAST 12 MONTHS, THE FOOD YOU BOUGHT JUST DIDN'T LAST AND YOU DIDN'T HAVE MONEY TO GET MORE.: NEVER TRUE

## 2023-02-16 SDOH — ECONOMIC STABILITY: HOUSING INSECURITY
IN THE LAST 12 MONTHS, WAS THERE A TIME WHEN YOU DID NOT HAVE A STEADY PLACE TO SLEEP OR SLEPT IN A SHELTER (INCLUDING NOW)?: NO

## 2023-02-16 SDOH — ECONOMIC STABILITY: INCOME INSECURITY: HOW HARD IS IT FOR YOU TO PAY FOR THE VERY BASICS LIKE FOOD, HOUSING, MEDICAL CARE, AND HEATING?: NOT HARD AT ALL

## 2023-02-16 ASSESSMENT — PATIENT HEALTH QUESTIONNAIRE - PHQ9
3. TROUBLE FALLING OR STAYING ASLEEP: 0
SUM OF ALL RESPONSES TO PHQ QUESTIONS 1-9: 13
7. TROUBLE CONCENTRATING ON THINGS, SUCH AS READING THE NEWSPAPER OR WATCHING TELEVISION: 3
SUM OF ALL RESPONSES TO PHQ QUESTIONS 1-9: 13
9. THOUGHTS THAT YOU WOULD BE BETTER OFF DEAD, OR OF HURTING YOURSELF: 0
SUM OF ALL RESPONSES TO PHQ9 QUESTIONS 1 & 2: 4
1. LITTLE INTEREST OR PLEASURE IN DOING THINGS: 3
4. FEELING TIRED OR HAVING LITTLE ENERGY: 3
SUM OF ALL RESPONSES TO PHQ QUESTIONS 1-9: 13
2. FEELING DOWN, DEPRESSED OR HOPELESS: 1
10. IF YOU CHECKED OFF ANY PROBLEMS, HOW DIFFICULT HAVE THESE PROBLEMS MADE IT FOR YOU TO DO YOUR WORK, TAKE CARE OF THINGS AT HOME, OR GET ALONG WITH OTHER PEOPLE: 1
8. MOVING OR SPEAKING SO SLOWLY THAT OTHER PEOPLE COULD HAVE NOTICED. OR THE OPPOSITE, BEING SO FIGETY OR RESTLESS THAT YOU HAVE BEEN MOVING AROUND A LOT MORE THAN USUAL: 0
5. POOR APPETITE OR OVEREATING: 3
SUM OF ALL RESPONSES TO PHQ QUESTIONS 1-9: 13
6. FEELING BAD ABOUT YOURSELF - OR THAT YOU ARE A FAILURE OR HAVE LET YOURSELF OR YOUR FAMILY DOWN: 0

## 2023-02-16 ASSESSMENT — LIFESTYLE VARIABLES
HOW OFTEN DO YOU HAVE A DRINK CONTAINING ALCOHOL: NEVER
HOW MANY STANDARD DRINKS CONTAINING ALCOHOL DO YOU HAVE ON A TYPICAL DAY: PATIENT DOES NOT DRINK

## 2023-02-16 NOTE — PATIENT INSTRUCTIONS
Learning About Mindfulness for Stress  What are mindfulness and stress? Stress is what you feel when you have to handle more than you are used to. A lot of things can cause stress. You may feel stress when you go on a job interview, take a test, or run a race. This kind of short-term stress is normal and even useful. It can help you if you need to work hard or react quickly. Stress also can last a long time. Long-term stress is caused by stressful situations or events. Examples of long-term stress include long-term health problems, ongoing problems at work, and conflicts in your family. Long-term stress can harm your health. Mindfulness is a focus only on things happening in the present moment. It's a process of purposefully paying attention to and being aware of your surroundings, your emotions, your thoughts, and how your body feels. You are aware of these things, but you aren't judging these experiences as \"good\" or \"bad. \" Mindfulness can help you learn to calm your mind and body to help you cope with illness, pain, and stress. How does mindfulness help to relieve stress? Mindfulness can help quiet your mind and relax your body. Studies show that it can help some people sleep better, feel less anxious, and bring their blood pressure down. And it's been shown to help some people live and cope better with certain health problems like heart disease, depression, chronic pain, and cancer. How do you practice mindfulness? To be mindful is to pay attention, to be present, and to be accepting. When you're mindful, you do just one thing and you pay close attention to that one thing. For example, you may sit quietly and notice your emotions or how your food tastes and smells. When you're present, you focus on the things that are happening right now. You let go of your thoughts about the past and the future. When you dwell on the past or the future, you miss moments that can heal and strengthen you.  You may miss moments like hearing a child laugh or seeing a friendly face when you think you're all alone. When you're accepting, you don't  the present moment. Instead you accept your thoughts and feelings as they come. You can practice anytime, anywhere, and in any way you choose. You can practice in many ways. Here are a few ideas:  While doing your chores, like washing the dishes, let your mind focus on what's in your hand. What does the dish feel like? Is the water warm or cold? Go outside and take a few deep breaths. What is the air like? Is it warm or cold? When you can, take some time at the start of your day to sit alone and think. Take a slow walk by yourself. Count your steps while you breathe in and out. Try yoga breathing exercises, stretches, and poses to strengthen and relax your muscles. At work, if you can, try to stop for a few moments each hour. Note how your body feels. Let yourself regroup and let your mind settle before you return to what you were doing. If you struggle with anxiety or \"worry thoughts,\" imagine your mind as a blue jamari and your worry thoughts as clouds. Now imagine those worry thoughts floating across your mind's jamari. Just let them pass by as you watch. Follow-up care is a key part of your treatment and safety. Be sure to make and go to all appointments, and call your doctor if you are having problems. It's also a good idea to know your test results and keep a list of the medicines you take. Where can you learn more? Go to http://www.foley.com/ and enter M676 to learn more about \"Learning About Mindfulness for Stress. \"  Current as of: February 9, 2022               Content Version: 13.5  © 2006-2022 Healthwise, Incorporated. Care instructions adapted under license by [facility] [employer] [facility] ([facility]).  If you have questions about a medical condition or this instruction, always ask your healthcare professional. [address] any warranty or liability for your use of this information. Fatigue: Care Instructions  Your Care Instructions     Fatigue is a feeling of tiredness, exhaustion, or lack of energy. You may feel fatigue because of too much or not enough activity. It can also come from stress, lack of sleep, boredom, and poor diet. Many medical problems, such as viral infections, can cause fatigue. Emotional problems, especially depression, are often the cause of fatigue. Fatigue is most often a symptom of another problem. Treatment for fatigue depends on the cause. For example, if you have fatigue because you have a certain health problem, treating this problem also treats your fatigue. If depression or anxiety is the cause, treatment may help. Follow-up care is a key part of your treatment and safety. Be sure to make and go to all appointments, and call your doctor if you are having problems. It's also a good idea to know your test results and keep a list of the medicines you take. How can you care for yourself at home? Get regular exercise. But don't overdo it. Go back and forth between rest and exercise. Get plenty of rest.  Eat a healthy diet. Do not skip meals, especially breakfast.  Reduce your use of caffeine, tobacco, and alcohol. Caffeine is most often found in coffee, tea, cola drinks, and chocolate. Limit medicines that can cause fatigue. This includes tranquilizers and cold and allergy medicines. When should you call for help? Watch closely for changes in your health, and be sure to contact your doctor if:    You have new symptoms such as fever or a rash.     Your fatigue gets worse.     You have been feeling down, depressed, or hopeless. Or you may have lost interest in things that you usually enjoy.     You are not getting better as expected. Where can you learn more? Go to http://www.woods.com/ and enter A094 to learn more about \"Fatigue: Care Instructions. \"  Current as of: February 9, 0977               ZUEFHRX Version: 13.5  © 5235-0654 Healthwise, Location. Care instructions adapted under license by [facility] ([facility]). If you have questions about a medical condition or this instruction, always ask your healthcare professional. [address] any warranty or liability for your use of this information. Learning About Emotional Support  When do you need emotional support? You might find getting support from others helpful when you have a long-term health problem. Often people feel alone, confused, or scared when coping with an illness. But you aren't alone. Other people are going through the same thing you are and know how you feel. Talking with others about your feelings can help you feel better. Your family and friends can give you support. So can your doctor, a support group, or a [facility]aritan. If you have a support network, you will not feel as alone. You will learn new ways to deal with your situation, and you may try harder to overcome it. Where you can get support  Family and friends: They can help you cope by giving you comfort and encouragement. Counseling: Professional counseling can help you cope with situations that interfere with your life and cause stress. Counseling can help you understand and deal with your illness. Your doctor: Find a doctor you trust and feel comfortable with. Be open and honest about your fears and concerns. Your doctor can help you get the right medical treatments, including counseling. Spiritual or Sabianism groups: They can provide comfort and may be able to help you find counseling or other social support services. Social groups: They can help you meet new people and get involved in activities you enjoy. Community support groups: In a support group, you can talk to others who have dealt with the same problems or illness as you. You can encourage one another and learn ways to cope with tough emotions.   How to find a support group  Ask your doctor, counselor, or other health professional for suggestions. Contact your local Sabianism, Advent, Caodaism, or other Holiness group. Ask your family and friends. Ask people who have the same health concerns. Go online. Forums and blogs let you read messages from others and leave your own messages. You can exchange stories, vent your frustrations, and ask and answer questions. Contact a city, state, or national group that provides support for your health concerns. Your library or [facility] may have a list of these groups. Or you can look for information online. Look for a support group that works for you. Ask yourself if you prefer structure and would like a , or if you would like a less formal group. Do you prefer face-to-face meetings? Or do you feel more secure in online chat rooms or forums? Supportive relationships  A supportive relationship includes emotional support such as love, trust, and understanding, as well as advice and concrete help, such as help managing your time. Reach out to others  Family and friends can help you. Ask them to:  Listen to you and give you encouragement. This can keep you from feeling hopeless or alone. Help with small daily tasks or with bigger problems. A helping hand can keep you from feeling overwhelmed. Help you manage a health problem. For example, ask them to go to doctor visits with you. Your loved ones can offer support by being involved in your medical care. Respect your relationships  A good relationship is also a two-way street. You count on help from others, but they also count on you. Know your friends' limits. You don't have to see or call your friends every day. If you are going through a rough patch, ask friends if you can contact them outside of the usual boundaries. Don't always complain or talk about yourself. Know when it's time to stop talking and listen or just enjoy your friend's company.   Know that good friends can be a bad influence. For example, if a friend encourages you to drink when you know it will harm you, you may want to end the friendship. Where can you learn more? Go to http://www.woods.com/ and enter G092 to learn more about \"Learning About Emotional Support. \"  Current as of: February 9, 2022               Content Version: 13.5  © 2006-2022 [employer]. Care instructions adapted under license by [facility] ([facility]). If you have questions about a medical condition or this instruction, always ask your healthcare professional. [address] any warranty or liability for your use of this information. Learning About Stress  What is stress? Stress is what you feel when you have to handle more than you are used to. Stress is a fact of life for most people, and it affects everyone differently. What causes stress for you may not be stressful for someone else. A lot of things can cause stress. You may feel stress when you go on a job interview, take a test, or run a race. This kind of short-term stress is normal and even useful. It can help you if you need to work hard or react quickly. For example, stress can help you finish an important job on time. Stress also can last a long time. Long-term stress is caused by stressful situations or events. Examples of long-term stress include long-term health problems, ongoing problems at work, or conflicts in your family. Long-term stress can harm your health. How does stress affect your health? When you are stressed, your body responds as though you are in danger. It makes hormones that speed up your heart, make you breathe faster, and give you a burst of energy. This is called the fight-or-flight stress response. If the stress is over quickly, your body goes back to normal and no harm is done. But if stress happens too often or lasts too long, it can have bad effects.  Long-term stress can make you more likely to get sick, and it can make symptoms of some diseases worse. If you tense up when you are stressed, you may develop neck, shoulder, or low back pain. Stress is linked to high blood pressure and heart disease. Stress also harms your emotional health. It can make you carmona, tense, or depressed. Your relationships may suffer, and you may not do well at work or school. What can you do to manage stress? How to relax your mind   Write. It may help to write about things that are bothering you. This helps you find out how much stress you feel and what is causing it. When you know this, you can find better ways to cope. Let your feelings out. Talk, laugh, cry, and express anger when you need to. Talking with friends, family, a counselor, or a member of the clergy about your feelings is a healthy way to relieve stress. Do something you enjoy. For example, listen to music or go to a movie. Practice your hobby or do volunteer work. Meditate. This can help you relax, because you are not worrying about what happened before or what may happen in the future. Do guided imagery. Imagine yourself in any setting that helps you feel calm. You can use audiotapes, books, or a teacher to guide you. How to relax your body   Do something active. Exercise or activity can help reduce stress. Walking is a great way to get started. Even everyday activities such as housecleaning or yard work can help. Do breathing exercises. For example:  From a standing position, bend forward from the waist with your knees slightly bent. Let your arms dangle close to the floor. Breathe in slowly and deeply as you return to a standing position. Roll up slowly and lift your head last.  Hold your breath for just a few seconds in the standing position. Breathe out slowly and bend forward from the waist.  Try yoga or mellisa chi. These techniques combine exercise and meditation. You may need some training at first to learn them. What can you do to prevent stress?   Manage your time. This helps you find time to do the things you want and need to do. Get enough sleep. Your body recovers from the stresses of the day while you are sleeping. Get support. Your family, friends, and community can make a difference in how you experience stress. Where can you learn more? Go to http://www.foley.com/ and enter N032 to learn more about \"Learning About Stress. \"  Current as of: October 6, 2021               Content Version: 13.5  © 2006-2022 [employer]. Care instructions adapted under license by [facility] . If you have questions about a medical condition or this instruction, always ask your healthcare professional. [place] [address] any warranty or liability for your use of this information. Learning About Managing Anger  What causes anger? Many things can cause anger: Stress at work or at home. Social situations that make you angry. A response to everyday events. Anger signals your body to prepare for a fight. This reaction is often called \"fight or flight. \" When you get angry, adrenaline and other hormones are released into your blood. Then your blood pressure goes up, your heart beats faster, and you breathe faster. When you express anger in a healthy way, it can inspire change and make you productive. But if you don't have the skills to express anger in a healthy way, anger can build up. You may hurt others--and yourself--emotionally and even physically. Violent behavior often starts with verbal threats or fairly minor incidents. But over time, it can involve physical harm. It can include physical, verbal, or sexual abuse of an intimate partner (domestic violence), a child (child abuse), or an older adult (elder abuse). It can also make you sick. Anger and constant hostility keep your blood pressure high. They increase your chances of having another health problem, such as depression, a heart attack, or a stroke.  Some people with post-traumatic stress disorder (PTSD) feel angry and on alert all the time. It may feel like there are no other ways to react when you are angry. But when you learn to work with anger in appropriate and healthy ways, your anger no longer controls you. How can you manage your anger? The first step to managing anger is to be more aware of it. Note the thoughts, feelings, and emotions that you have when you get angry. Practice noticing these signs of anger when you are calm. If you are more aware of the signs of anger, you can take steps to manage it. Here are a few tips: Think before you act. Take time to stop and cool down when you feel yourself getting angry. Count to 10 while you take slow, steady breaths. Practice some other form of mental relaxation. Learn the feelings that lead to angry outbursts. Anger and hostility may be a symptom of unhappy feelings or depression about your job, your relationship, or other aspects of your personal life. Avoid situations that lead to angry outbursts. If standing in line bothers you, do errands at less busy times. Express anger in a healthy way. You might:  Go for a short walk or jog. Draw, paint, or listen to music to release the anger. Write in a daily journal.  Use \"I\" statements, not \"you\" statements, to discuss your anger. Say \"I don't feel valued when my needs are not being met\" instead of \"You make me mad when you are so inconsiderate. \"  Take care of yourself. Exercise regularly. Eat a variety of healthy foods. Don't skip meals. Try to get 8 hours of sleep each night. Limit your use of alcohol, and don't use drugs. Practice yoga, meditation, or mellisa chi to relax. Explore other resources that may be available through your job or your community. Contact your human resources department at work. You might be able to get services through an employee assistance program.  Contact your local hospital, mental health facility, or health department.  Ask what types of programs or support groups are available in your area. Do not keep guns in your home. If you must have guns in your home, unload them and lock them up. Lock ammunition in a separate place. Keep guns away from children. Where can you find help? If anger or stress starts to harm your work or personal relationships, you might seek help. You can learn ways to manage your feelings and actions. Talk to someone you trust, or find a counselor. There are groups in your area that can connect you with people to talk to. [facility] . This service from the [facility] Substance Abuse and [employer]  can help you find local counselors. Search online at [employer]. [place].gov or call 3-352-927-HELP ([phone]), or TDD 1-183.566.2700. Parents Anonymous. Self-help groups that serve parents under stress, as well as children who have been abused, are available throughout the [facility], [facility] ([facility]), and [facility]. To find a group in your area, search online or in your phone book under Parents Anonymous or call (202) 072-0321. Where can you learn more? Go to http://www.foley.com/ and enter Z357 to learn more about \"Learning About Managing Anger. \"  Current as of: February 9, 2022               Content Version: 13.5  © 9754-9384 Healthwise, Incorporated. Care instructions adapted under license by [facility] ([facility]). If you have questions about a medical condition or this instruction, always ask your healthcare professional. [place] [address] any warranty or liability for your use of this information. Learning About Being Active as an Older Adult  Why is being active important as you get older? Being active is one of the best things you can do for your health. And it's never too late to start. Being active--or getting active, if you aren't already--has definite benefits. It can:  Give you more energy,  Keep your mind sharp.   Improve balance to reduce your risk of falls. Help you manage chronic illness with fewer medicines. No matter how old you are, how fit you are, or what health problems you have, there is a form of activity that will work for you. And the more physical activity you can do, the better your overall health will be. What kinds of activity can help you stay healthy? Being more active will make your daily activities easier. Physical activity includes planned exercise and things you do in daily life. There are four types of activity:  Aerobic. Doing aerobic activity makes your heart and lungs strong. Includes walking, dancing, and gardening. Aim for at least 2½ hours spread throughout the week. It improves your energy and can help you sleep better. Muscle-strengthening. This type of activity can help maintain muscle and strengthen bones. Includes climbing stairs, using resistance bands, and lifting or carrying heavy loads. Aim for at least twice a week. It can help protect the knees and other joints. Stretching. Stretching gives you better range of motion in joints and muscles. Includes upper arm stretches, calf stretches, and gentle yoga. Aim for at least twice a week, preferably after your muscles are warmed up from other activities. It can help you function better in daily life. Balancing. This helps you stay coordinated and have good posture. Includes heel-to-toe walking, mellisa chi, and certain types of yoga. Aim for at least 3 days a week. It can reduce your risk of falling. Even if you have a hard time meeting the recommendations, it's better to be more active than less active. All activity done in each category counts toward your weekly total. You'd be surprised how daily things like carrying groceries, keeping up with grandchildren, and taking the stairs can add up. What keeps you from being active? If you've had a hard time being more active, you're not alone.  Maybe you remember being able to do more. Or maybe you've never thought of yourself as being active. It's frustrating when you can't do the things you want. Being more active can help. What's holding you back? Getting started. Have a goal, but break it into easy tasks. Small steps build into big accomplishments. Staying motivated. If you feel like skipping your activity, remember your goal. Maybe you want to move better and stay independent. Every activity gets you one step closer. Not feeling your best.  Start with 5 minutes of an activity you enjoy. Prove to yourself you can do it. As you get comfortable, increase your time. You may not be where you want to be. But you're in the process of getting there. Everyone starts somewhere. How can you find safe ways to stay active? Talk with your doctor about any physical challenges you're facing. Make a plan with your doctor if you have a health problem or aren't sure how to get started with activity. If you're already active, ask your doctor if there is anything you should change to stay safe as your body and health change. If you tend to feel dizzy after you take medicine, avoid activity at that time. Try being active before you take your medicine. This will reduce your risk of falls. If you plan to be active at home, make sure to clear your space before you get started. Remove things like TV cords, coffee tables, and throw rugs. It's safest to have plenty of space to move freely. The key to getting more active is to take it slow and steady. Try to improve only a little bit at a time. Pick just one area to improve on at first. And if an activity hurts, stop and talk to your doctor. Where can you learn more? Go to http://www.foley.com/ and enter P600 to learn more about \"Learning About Being Active as an Older Adult. \"  Current as of: October 10, 2022               Content Version: 13.5  © 1588-8559 Healthwise, Incorporated.    Care instructions adapted under license by [facility]. If you have questions about a medical condition or this instruction, always ask your healthcare professional. [address] any warranty or liability for your use of this information. Learning About Dental Care for Older Adults  Dental care for older adults: Overview  Dental care for older people is much the same as for younger adults. But older adults do have concerns that younger adults do not. Older adults may have problems with gum disease and decay on the roots of their teeth. They may need missing teeth replaced or broken fillings fixed. Or they may have dentures that need to be cared for. Some older adults may have trouble holding a toothbrush. You can help remind the person you are caring for to brush and floss their teeth or to clean their dentures. In some cases, you may need to do the brushing and other dental care tasks. People who have trouble using their hands or who have dementia may need this extra help. How can you help with dental care? Normal dental care  To keep the teeth and gums healthy:  Brush the teeth with fluoride toothpaste twice a day--in the morning and at night--and floss at least once a day. Plaque can quickly build up on the teeth of older adults. Watch for the signs of gum disease. These signs include gums that bleed after brushing or after eating hard foods, such as apples. See a dentist regularly. Many experts recommend checkups every 6 months. Keep the dentist up to date on any new medications the person is taking. Encourage a balanced diet that includes whole grains, vegetables, and fruits, and that is low in saturated fat and sodium. Encourage the person you're caring for not to use tobacco products. They can affect dental and general health. Many older adults have a fixed income and feel that they can't afford dental care. But most [facility] and [facility] have programs in which dentists help older adults by lowering fees.  Contact your area's public health offices or  for information about dental care in your area. Using a toothbrush  Older adults with arthritis sometimes have trouble brushing their teeth because they can't easily hold the toothbrush. Their hands and fingers may be stiff, painful, or weak. If this is the case, you can: Offer an electric toothbrush. Enlarge the handle of a non-electric toothbrush by wrapping a sponge, an elastic bandage, or adhesive tape around it. Push the toothbrush handle through a ball made of rubber or soft foam.  Make the handle longer and thicker by taping Popsicle sticks or tongue depressors to it. You may also be able to buy special toothbrushes, toothpaste dispensers, and floss holders. Your doctor may recommend a soft-bristle toothbrush if the person you care for bleeds easily. Bleeding can happen because of a health problem or from certain medicines. A toothpaste for sensitive teeth may help if the person you care for has sensitive teeth. How do you brush and floss someone's teeth? If the person you are caring for has a hard time cleaning their teeth on their own, you may need to brush and floss their teeth for them. It may be easiest to have the person sit and face away from you, and to sit or stand behind them. That way you can steady their head against your arm as you reach around to floss and brush their teeth. Choose a place that has good lighting and is comfortable for both of you. Before you begin, gather your supplies. You will need gloves, floss, a toothbrush, and a container to hold water if you are not near a sink. Wash and dry your hands well and put on gloves. Start by flossing:  Gently work a piece of floss between each of the teeth toward the gums. A plastic flossing tool may make this easier, and they are available at most [employer]. Curve the floss around each tooth into a U-shape and gently slide it under the gum line.   Move the floss firmly up and down several times to scrape off the plaque. After you've finished flossing, throw away the used floss and begin brushing:  Wet the brush and apply toothpaste. Place the brush at a 45-degree angle where the teeth meet the gums. Press firmly, and move the brush in small circles over the surface of the teeth. Be careful not to brush too hard. Vigorous brushing can make the gums pull away from the teeth and can scratch the tooth enamel. Brush all surfaces of the teeth, on the tongue side and on the cheek side. Pay special attention to the front teeth and all surfaces of the back teeth. Brush chewing surfaces with short back-and-forth strokes. After you've finished, help the person rinse the remaining toothpaste from their mouth. Where can you learn more? Go to http://www.woods.com/ and enter F944 to learn more about \"Learning About Dental Care for Older Adults. \"  Current as of: June 16, 2022               Content Version: 13.5  © 2006-2022 [employer]. Care instructions adapted under license by [facility] ([facility]). If you have questions about a medical condition or this instruction, always ask your healthcare professional. [place] [address] any warranty or liability for your use of this information. Learning About Vision Tests  What are vision tests? The four most common vision tests are visual acuity tests, refraction, visual field tests, and color vision tests. Visual acuity (sharpness) tests  These tests are used: To see if you need glasses or contact lenses. To monitor an eye problem. To check an eye injury. Visual acuity tests are done as part of routine exams. You may also have this test when you get your 's license or apply for some types of jobs. Visual field tests  These tests are used: To check for vision loss in any area of your range of vision. To screen for certain eye diseases.   To look for nerve damage after a stroke, head injury, or other problem that could reduce blood flow to the brain. Refraction and color tests  A refraction test is done to find the right prescription for glasses and contact lenses. A color vision test is done to check for color blindness. Color vision is often tested as part of a routine exam. You may also have this test when you apply for a job where recognizing different colors is important, such as , electronics, or the [employer]. How are vision tests done? Visual acuity test   You cover one eye at a time. You read aloud from a wall chart across the room. You read aloud from a small card that you hold in your hand. Refraction   You look into a special device. The device puts lenses of different strengths in front of each eye to see how strong your glasses or contact lenses need to be. Visual field tests   Your doctor may have you look through special machines. Or your doctor may simply have you stare straight ahead while they move a finger into and out of your field of vision. Color vision test   You look at pieces of printed test patterns in various colors. You say what number or symbol you see. Your doctor may have you trace the number or symbol using a pointer. How do these tests feel? There is very little chance of having a problem from this test. If dilating drops are used for a vision test, they may make the eyes sting and cause a medicine taste in the mouth. Follow-up care is a key part of your treatment and safety. Be sure to make and go to all appointments, and call your doctor if you are having problems. It's also a good idea to know your test results and keep a list of the medicines you take. Where can you learn more? Go to http://www.foley.com/ and enter G551 to learn more about \"Learning About Vision Tests. \"  Current as of: October 12, 2022               Content Version: 13.5  © 6288-6040 [employer].    Care instructions adapted under license by [facility] ([facility]). If you have questions about a medical condition or this instruction, always ask your healthcare professional. [address] any warranty or liability for your use of this information. Advance Directives: Care Instructions  Overview  An advance directive is a legal way to state your wishes at the end of your life. It tells your family and your doctor what to do if you can't say what you want. There are two main types of advance directives. You can change them any time your wishes change. Living will. This form tells your family and your doctor your wishes about life support and other treatment. The form is also called a declaration. Medical power of . This form lets you name a person to make treatment decisions for you when you can't speak for yourself. This person is called a health care agent (health care proxy, health care surrogate). The form is also called a durable power of  for health care. If you do not have an advance directive, decisions about your medical care may be made by a family member, or by a doctor or a  who doesn't know you. It may help to think of an advance directive as a gift to the people who care for you. If you have one, they won't have to make tough decisions by themselves. For more information, including forms for your state, see the [address] website (www.caringinfo.org/planning/advance-directives/). Follow-up care is a key part of your treatment and safety. Be sure to make and go to all appointments, and call your doctor if you are having problems. It's also a good idea to know your test results and keep a list of the medicines you take. What should you include in an advance directive? Many states have a unique advance directive form. (It may ask you to address specific issues.) Or you might use a universal form that's approved by many states.   If your form doesn't tell you what to address, it may be hard to know what to include in your advance directive. Use the questions below to help you get started. Who do you want to make decisions about your medical care if you are not able to? What life-support measures do you want if you have a serious illness that gets worse over time or can't be cured? What are you most afraid of that might happen? (Maybe you're afraid of having pain, losing your independence, or being kept alive by machines.)  Where would you prefer to die? (Your home? A hospital? A nursing home?)  Do you want to donate your organs when you die? Do you want certain Buddhist practices performed before you die? When should you call for help? Be sure to contact your doctor if you have any questions. Where can you learn more? Go to http://www.foley.com/ and enter R264 to learn more about \"Advance Directives: Care Instructions. \"  Current as of: June 16, 2022               Content Version: 13.5  © 7082-2139 [employer]. Care instructions adapted under license by [facility] ([facility]). If you have questions about a medical condition or this instruction, always ask your healthcare professional. [place] [address] any warranty or liability for your use of this information. Personalized Preventive Plan for Musa Stratton - 2/16/2023  Medicare offers a range of preventive health benefits. Some of the tests and screenings are paid in full while other may be subject to a deductible, co-insurance, and/or copay. Some of these benefits include a comprehensive review of your medical history including lifestyle, illnesses that may run in your family, and various assessments and screenings as appropriate. After reviewing your medical record and screening and assessments performed today your provider may have ordered immunizations, labs, imaging, and/or referrals for you.   A list of these orders (if applicable) as well as your Preventive Care list are included within your After Visit Summary for your review. Other Preventive Recommendations:    A preventive eye exam performed by an eye specialist is recommended every 1-2 years to screen for glaucoma; cataracts, macular degeneration, and other eye disorders. A preventive dental visit is recommended every 6 months. Try to get at least 150 minutes of exercise per week or 10,000 steps per day on a pedometer . Order or download the FREE \"Exercise & Physical Activity: Your Everyday Guide\" from [employer]. Call 8-891.383.9913 or search [employer] online. You need 2036-8728 mg of calcium and 4555-1397 IU of vitamin D per day. It is possible to meet your calcium requirement with diet alone, but a vitamin D supplement is usually necessary to meet this goal.  When exposed to the sun, use a sunscreen that protects against both UVA and UVB radiation with an SPF of 30 or greater. Reapply every 2 to 3 hours or after sweating, drying off with a towel, or swimming. Always wear a seat belt when traveling in a car. Always wear a helmet when riding a bicycle or motorcycle.

## 2023-02-16 NOTE — PROGRESS NOTES
Medicare Annual Wellness Visit    Karolina Rubio is here for Medicare AWV    Assessment & Plan   Medicare annual wellness visit, subsequent  DDD (degenerative disc disease), lumbar  -     traMADol (ULTRAM) 50 MG tablet; Take 1 tablet by mouth in the morning and 1 tablet in the evening. Do all this for 30 days. Max Daily Amount: 100 mg., Disp-60 tablet, R-0Normal      Recommendations for Preventive Services Due: see orders and patient instructions/AVS.  Recommended screening schedule for the next 5-10 years is provided to the patient in written form: see Patient Instructions/AVS.     Return in 4 months (on 6/16/2023). Subjective       Patient's complete Health Risk Assessment and screening values have been reviewed and are found in Flowsheets. The following problems were reviewed today and where indicated follow up appointments were made and/or referrals ordered. Positive Risk Factor Screenings with Interventions:        Depression:  PHQ-2 Score: 4  PHQ-9 Total Score: 13    Interpretation:   1-4 = minimal  5-9 = mild  10-14 = moderate  15-19 = moderately severe  20-27 = severe  Interventions:  Referred to Psychology           Opioid Risk: (Low risk score <55) Opioid risk score: 11    Patient is low risk for opioid use disorder or overdose.   Last PDMP Krista Ames as Reviewed:  Review User Review Instant Review Result                  General HRA Questions:  Select all that apply: (!) New or Increased Pain, New or Increased Fatigue, Loneliness, Social Isolation, Stress, Anger    Pain Interventions:  Patient advised to follow up in the office for further evaluation and treatment    Fatigue Interventions:  Patient declined any further interventions or treatment    Loneliness Interventions:  Patient declined any further interventions or treatment    Social Isolation Interventions:  Patient declined any further interventions or treatment    Stress Interventions:  Patient declined any further interventions or treatment    Anger Interventions:  Patient declined any further interventions or treatment       Weight and Activity:  Physical Activity: Inactive    Days of Exercise per Week: 0 days    Minutes of Exercise per Session: 0 min     On average, how many days per week do you engage in moderate to strenuous exercise (like a brisk walk)?: 0 days  Have you lost any weight without trying in the past 3 months?: (!) Yes  Body mass index: 24.69      Inactivity Interventions:  Patient comments: she is willing to do more walking     Unintentional Weight Loss Interventions:        Dentist Screen:  Have you seen the dentist within the past year?: (!) No    Intervention:  Patient declines any further evaluation or treatment     Vision Screen:  Do you have difficulty driving, watching TV, or doing any of your daily activities because of your eyesight?: (!) Yes  Have you had an eye exam within the past year?: (!) No  No results found. Interventions:   Patient encouraged to make appointment with their eye specialist    Safety:  Do you have any tripping hazards - loose or unsecured carpets or rugs?: (!) Yes  Interventions:  Advised take up  runners      Advanced Directives:  Do you have a Living Will?: (!) No    Intervention:          Tobacco Use:  Tobacco Use: High Risk    Smoking Tobacco Use: Every Day    Smokeless Tobacco Use: Never    Passive Exposure: Not on file     E-cigarette/Vaping       Questions Responses    E-cigarette/Vaping Use     Start Date     Passive Exposure     Quit Date     Counseling Given     Comments           Interventions:                          Objective   Vitals:    02/16/23 1208   BP: (!) 146/90   Site: Right Upper Arm   Position: Sitting   Pulse: 67   Resp: 18   Temp: 98.2 °F (36.8 °C)   TempSrc: Temporal   SpO2: 96%   Weight: 139 lb 6.4 oz (63.2 kg)   Height: 5' 3\" (1.6 m)      Body mass index is 24.69 kg/m².                Allergies   Allergen Reactions    Gabapentin Anxiety    Ibuprofen Itching, Nausea Only and Swelling     swelling     Prior to Visit Medications    Medication Sig Taking? Authorizing Provider   traMADol (ULTRAM) 50 MG tablet Take 1 tablet by mouth in the morning and 1 tablet in the evening. Do all this for 30 days. Max Daily Amount: 100 mg.  Yes Rosa Rudolph MD   baclofen (LIORESAL) 10 MG tablet take 1 tablet by mouth three times a day for muscle spasm and shoulder pain Yes Rosa Rudolph MD   albuterol sulfate HFA (PROVENTIL;VENTOLIN;PROAIR) 108 (90 Base) MCG/ACT inhaler inhale 1 to 2 puffs by mouth every 4 to 6 hours if needed for shortness of breath Yes Ar Automatic Reconciliation   aspirin 81 MG chewable tablet chew and swallow 1 tablet by mouth once daily Yes Ar Automatic Reconciliation   Aspirin-Caffeine (YAIR BACK & BODY PAIN EX ST) 500-32.5 MG TABS Take by mouth Yes Ar Automatic Reconciliation   budesonide-formoterol (SYMBICORT) 160-4.5 MCG/ACT AERO inhale 2 puffs by mouth twice a day Yes Ar Automatic Reconciliation   Cholecalciferol 50 MCG (2000 UT) TABS Take 2,000 Units by mouth daily Yes Ar Automatic Reconciliation   docusate (COLACE, DULCOLAX) 100 MG CAPS take 2 capsules by mouth every evening if needed for constipation Yes Ar Automatic Reconciliation   fluticasone (FLONASE) 50 MCG/ACT nasal spray 1 spray by Nasal route daily Yes Ar Automatic Reconciliation   fluticasone-umeclidin-vilant (TRELEGY ELLIPTA) 200-62.5-25 MCG/ACT AEPB inhaler Inhale 1 puff into the lungs daily Yes Ar Automatic Reconciliation   linaclotide (LINZESS) 145 MCG capsule take 1 capsule by mouth once daily before breakfast for constipation Yes Ar Automatic Reconciliation   montelukast (SINGULAIR) 10 MG tablet take 1 tablet by mouth every evening Yes Ar Automatic Reconciliation   omeprazole (PRILOSEC) 20 MG delayed release capsule take 1 capsule by mouth once daily Yes Ar Automatic Reconciliation   simvastatin (ZOCOR) 10 MG tablet take 1 tablet by mouth every evening Yes Ar Automatic Reconciliation traZODone (DESYREL) 50 MG tablet take 2 tablets by mouth every evening if needed for insomnia Yes Ar Automatic Reconciliation   cloNIDine (CATAPRES) 0.2 MG tablet Take 0.2 mg by mouth 2 times daily  Patient not taking: Reported on 2/16/2023  Ar Automatic Reconciliation   magnesium citrate (CITROMA) SOLN Take 150ml po bid prn constipation  Patient not taking: Reported on 2/16/2023  Ar Automatic Reconciliation   naproxen (NAPROSYN) 500 MG tablet take 1 tablet by mouth twice a day take with meals  Patient not taking: Reported on 2/16/2023  Ar Automatic Reconciliation       CareTeam (Including outside providers/suppliers regularly involved in providing care):   Patient Care Team:  Estrella Luna MD as PCP - [facility] MD Reji as PCP - [facility] Provider     Reviewed and updated this visit:  Tobacco  Allergies  Meds  Problems  Med Hx  Surg Hx  Soc Hx  Fam Hx               Estrella Luna MD

## 2023-02-16 NOTE — TELEPHONE ENCOUNTER
She is having more trouble with bilateral shoulder pain . 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  She asked to see Shoulder specialist .

## 2023-02-25 NOTE — PROGRESS NOTES
Please let pt know that labs were normal except:    1) glucose low at 59. Is she having any dizziness or headaches? How is her appetite? Is taking the glucerna? 2) sodium little up at 145. We will monitor. Keep hydrated with water. 3) HgA1C is good at 5. 6. Unknown

## 2023-03-07 RX ORDER — ALBUTEROL SULFATE 90 UG/1
AEROSOL, METERED RESPIRATORY (INHALATION)
Qty: 8.5 G | Refills: 5 | Status: SHIPPED | OUTPATIENT
Start: 2023-03-07

## 2023-03-13 ENCOUNTER — TELEPHONE (OUTPATIENT)
Facility: CLINIC | Age: 68
End: 2023-03-13

## 2023-03-13 DIAGNOSIS — M51.36 DDD (DEGENERATIVE DISC DISEASE), LUMBAR: ICD-10-CM

## 2023-03-13 NOTE — TELEPHONE ENCOUNTER
Pt requesting refill. Last ov 2/16/23, no future appts made.     traMADol (ULTRAM) 50 MG tablet [[id number]]     Order Details  Dose: 50 mg Route: Oral Frequency: 2 TIMES DAILY   Dispense Quantity: 60 tablet Refills: 0    Note to Pharmacy: Reduce doses taken as pain becomes manageable         Sig: Take 1 tablet by mouth in the morning and 1 tablet in the evening. Do all this for 30 days. Max Daily Amount: 100 mg.

## 2023-03-16 NOTE — TELEPHONE ENCOUNTER
Patient has been out of her medication for 2 days and is very upset that this has not been done yet.

## 2023-03-17 RX ORDER — TRAMADOL HYDROCHLORIDE 50 MG/1
50 TABLET ORAL 2 TIMES DAILY
Qty: 60 TABLET | Refills: 0 | Status: SHIPPED | OUTPATIENT
Start: 2023-03-17 | End: 2023-04-16

## 2023-03-17 NOTE — TELEPHONE ENCOUNTER
reviewed. #60 filled 2/23/23 as a 30-day supply. Looks like she has used this Rx too quickly. 2/16/23 visit reviewed. Appears dosage was decreased. I will approve an additional #60, but please advise patient that this needs to last her a 30-day duration. She may take 1 twice daily.

## 2023-03-17 NOTE — TELEPHONE ENCOUNTER
Chart reviewed. Appears dosage was decreased. I will approve an additional #60, but please advise patient that this needs to last her a 30-day duration. She may take 1 twice daily.

## 2023-03-20 ENCOUNTER — TELEPHONE (OUTPATIENT)
Facility: CLINIC | Age: 68
End: 2023-03-20

## 2023-03-23 RX ORDER — TRAZODONE HYDROCHLORIDE 50 MG/1
TABLET ORAL
Qty: 180 TABLET | Refills: 0 | Status: SHIPPED | OUTPATIENT
Start: 2023-03-23

## 2023-03-23 RX ORDER — OMEPRAZOLE 20 MG/1
CAPSULE, DELAYED RELEASE ORAL
Qty: 90 CAPSULE | Refills: 0 | Status: SHIPPED | OUTPATIENT
Start: 2023-03-23

## 2023-03-23 RX ORDER — SIMVASTATIN 10 MG
TABLET ORAL
Qty: 90 TABLET | Refills: 0 | Status: SHIPPED | OUTPATIENT
Start: 2023-03-23

## 2023-04-18 ENCOUNTER — TELEPHONE (OUTPATIENT)
Facility: CLINIC | Age: 68
End: 2023-04-18

## 2023-04-18 DIAGNOSIS — M51.36 DDD (DEGENERATIVE DISC DISEASE), LUMBAR: ICD-10-CM

## 2023-04-18 DIAGNOSIS — M51.36 DDD (DEGENERATIVE DISC DISEASE), LUMBAR: Primary | ICD-10-CM

## 2023-04-18 RX ORDER — TRAMADOL HYDROCHLORIDE 50 MG/1
50 TABLET ORAL EVERY 8 HOURS PRN
Qty: 28 TABLET | Refills: 0 | Status: SHIPPED | OUTPATIENT
Start: 2023-04-18 | End: 2023-05-18

## 2023-04-18 RX ORDER — TRAMADOL HYDROCHLORIDE 50 MG/1
50 TABLET ORAL 2 TIMES DAILY
Qty: 60 TABLET | Refills: 0 | Status: CANCELLED | OUTPATIENT
Start: 2023-04-18 | End: 2023-05-18

## 2023-05-05 ENCOUNTER — TELEPHONE (OUTPATIENT)
Facility: CLINIC | Age: 68
End: 2023-05-05

## 2023-05-10 ENCOUNTER — HOSPITAL ENCOUNTER (OUTPATIENT)
Dept: WOMENS IMAGING | Facility: HOSPITAL | Age: 68
Discharge: HOME | End: 2023-05-13
Payer: MEDICARE

## 2023-05-10 DIAGNOSIS — Z12.31 SCREENING MAMMOGRAM FOR HIGH-RISK PATIENT: ICD-10-CM

## 2023-05-10 PROCEDURE — 77063 BREAST TOMOSYNTHESIS BI: CPT

## 2023-05-11 ENCOUNTER — OFFICE VISIT (OUTPATIENT)
Facility: CLINIC | Age: 68
End: 2023-05-11
Payer: MEDICARE

## 2023-05-11 ENCOUNTER — HOSPITAL ENCOUNTER (OUTPATIENT)
Facility: HOSPITAL | Age: 68
Setting detail: SPECIMEN
Discharge: HOME | End: 2023-05-11
Payer: MEDICARE

## 2023-05-11 VITALS
HEART RATE: 82 BPM | WEIGHT: 141 LBS | BODY MASS INDEX: 24.98 KG/M2 | SYSTOLIC BLOOD PRESSURE: 108 MMHG | DIASTOLIC BLOOD PRESSURE: 67 MMHG | HEIGHT: 63 IN | TEMPERATURE: 97.1 F | OXYGEN SATURATION: 96 % | RESPIRATION RATE: 22 BRPM

## 2023-05-11 DIAGNOSIS — M51.36 DDD (DEGENERATIVE DISC DISEASE), LUMBAR: ICD-10-CM

## 2023-05-11 DIAGNOSIS — G89.29 OTHER CHRONIC PAIN: Primary | ICD-10-CM

## 2023-05-11 DIAGNOSIS — G89.29 OTHER CHRONIC PAIN: ICD-10-CM

## 2023-05-11 DIAGNOSIS — M16.0 OSTEOARTHRITIS OF BOTH HIPS, UNSPECIFIED OSTEOARTHRITIS TYPE: ICD-10-CM

## 2023-05-11 DIAGNOSIS — J44.9 CHRONIC OBSTRUCTIVE PULMONARY DISEASE, UNSPECIFIED COPD TYPE (HCC): ICD-10-CM

## 2023-05-11 DIAGNOSIS — Z76.0 ENCOUNTER FOR MEDICATION REFILL: ICD-10-CM

## 2023-05-11 PROCEDURE — 99214 OFFICE O/P EST MOD 30 MIN: CPT | Performed by: INTERNAL MEDICINE

## 2023-05-11 PROCEDURE — 1090F PRES/ABSN URINE INCON ASSESS: CPT | Performed by: INTERNAL MEDICINE

## 2023-05-11 PROCEDURE — 1123F ACP DISCUSS/DSCN MKR DOCD: CPT | Performed by: INTERNAL MEDICINE

## 2023-05-11 PROCEDURE — G8428 CUR MEDS NOT DOCUMENT: HCPCS | Performed by: INTERNAL MEDICINE

## 2023-05-11 PROCEDURE — 80307 DRUG TEST PRSMV CHEM ANLYZR: CPT

## 2023-05-11 PROCEDURE — 3017F COLORECTAL CA SCREEN DOC REV: CPT | Performed by: INTERNAL MEDICINE

## 2023-05-11 PROCEDURE — G8420 CALC BMI NORM PARAMETERS: HCPCS | Performed by: INTERNAL MEDICINE

## 2023-05-11 PROCEDURE — 4004F PT TOBACCO SCREEN RCVD TLK: CPT | Performed by: INTERNAL MEDICINE

## 2023-05-11 PROCEDURE — G8399 PT W/DXA RESULTS DOCUMENT: HCPCS | Performed by: INTERNAL MEDICINE

## 2023-05-11 PROCEDURE — 3023F SPIROM DOC REV: CPT | Performed by: INTERNAL MEDICINE

## 2023-05-11 RX ORDER — TRAMADOL HYDROCHLORIDE 50 MG/1
50 TABLET ORAL EVERY 8 HOURS PRN
Qty: 90 TABLET | Refills: 0 | Status: SHIPPED | OUTPATIENT
Start: 2023-05-11 | End: 2023-06-10

## 2023-05-11 RX ORDER — MONTELUKAST SODIUM 10 MG/1
10 TABLET ORAL NIGHTLY
COMMUNITY

## 2023-05-11 RX ORDER — SOFT LENS DISINFECTANT
SOLUTION, NON-ORAL MISCELLANEOUS
COMMUNITY
Start: 2017-07-25

## 2023-05-11 RX ORDER — NEBULIZER ACCESSORIES
KIT MISCELLANEOUS
COMMUNITY
Start: 2017-05-22

## 2023-05-11 RX ORDER — BACLOFEN 10 MG/1
TABLET ORAL
Qty: 90 TABLET | Refills: 2 | Status: SHIPPED | OUTPATIENT
Start: 2023-05-11

## 2023-05-11 NOTE — PROGRESS NOTES
ROOM # 2    Identified pt with two pt identifiers(name and ). Reviewed record in preparation for visit and have obtained necessary documentation. Chief Complaint   Patient presents with    Medication Refill    Back Pain    Other     Other things that is going on with her      606/706 Anupama Mandujano preferred language for health care discussion is English/other. Is the patient using any DME equipment during OV? NO    606/706 Anupama Mandujano is due for:  Health Maintenance Due   Topic    DTaP/Tdap/Td vaccine (1 - Tdap)    Pneumococcal 65+ years Vaccine (2 - PCV)    Cervical cancer screen     COVID-19 Vaccine (4 - Booster)    Shingles vaccine (2 of 2)    Colorectal Cancer Screen      Health Maintenance reviewed and discussed per provider  Please order/place referral if appropriate. 1. For patients aged 39-70: Has the patient had a colonoscopy? DUE 2023    If the patient is female:    2. For patients aged 41-77: Has the patient had a mammogram within the past 2 years? DUE 2024    3. For patients aged 21-65: Has the patient had a pap smear? CARE GAP PRESENT    Advance Directive:  1. Do you have an advance directive in place? Patient Reply: NOT ASKED    2. If not, would you like material regarding how to put one in place? NOT ASKED    Coordination of Care:  1. Have you been to the ER, urgent care clinic since your last visit? Hospitalized since your last visit? NO    2. Have you seen or consulted any other health care providers outside of the [address] [place] [address] since your last visit? Include any pap smears or colon screening. NO    Patient is accompanied by HERSELF I have received verbal consent from Jose Antonio/706 Anupama Mandujano to discuss any/all medical information while they are present in the room. Learning Assessment:  No flowsheet data found. Depression Screening:  No flowsheet data found. Abuse Screening:  No flowsheet data found. Fall Risk  No flowsheet data found.   Recent Travel Screening

## 2023-05-11 NOTE — PROGRESS NOTES
(YAIR BACK & BODY PAIN EX ST) 500-32.5 MG TABS, Take by mouth, Disp: , Rfl:     budesonide-formoterol (SYMBICORT) 160-4.5 MCG/ACT AERO, inhale 2 puffs by mouth twice a day, Disp: , Rfl:     Cholecalciferol 50 MCG (2000 UT) TABS, Take 1 tablet by mouth daily, Disp: , Rfl:     docusate (COLACE, DULCOLAX) 100 MG CAPS, take 2 capsules by mouth every evening if needed for constipation, Disp: , Rfl:     fluticasone-umeclidin-vilant (TRELEGY ELLIPTA) 200-62.5-25 MCG/ACT AEPB inhaler, Inhale 1 puff into the lungs daily, Disp: , Rfl:     linaclotide (LINZESS) 145 MCG capsule, take 1 capsule by mouth once daily before breakfast for constipation, Disp: , Rfl:      Allergies   Allergen Reactions    Gabapentin Anxiety    Ibuprofen Itching, Nausea Only and Swelling     swelling       Review of Systems       Physical Exam:      There were no vitals taken for this visit. Physical Exam  Constitutional:       Appearance: Normal appearance. Cardiovascular:      Rate and Rhythm: Normal rate and regular rhythm. Heart sounds: Normal heart sounds. No murmur heard. No friction rub. No gallop. Skin:     General: Skin is warm and dry. Neurological:      Mental Status: She is alert. Labs Reviewed:      Assessment/Plan       ICD-10-CM    1.  DDD (degenerative disc disease), lumbar  M51.36 traMADol (ULTRAM) 50 MG tablet           Blaire Sapp MD
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
## 2023-05-18 LAB — DRUGS UR: NORMAL

## 2023-06-05 DIAGNOSIS — M51.36 DDD (DEGENERATIVE DISC DISEASE), LUMBAR: ICD-10-CM

## 2023-06-05 NOTE — TELEPHONE ENCOUNTER
Chart reviewed:tramadol last prescribed 5/11/23 for 90 tabs, script written to ends 6/10/23. S/w the pt and she informs that she will run out of tramadol on Thursday (6/8/2  3). Discussed with the pt the PCP is currently out of the office however will forward this request for the covering provider's review. Pt is informed it is at the  discretion of the covering provider to prescribed medication (s) for a therapeutic response. Pt verbalizes understanding. Will notify.

## 2023-06-05 NOTE — TELEPHONE ENCOUNTER
Patient request for refill, Last OV 5/11/23, no future appts scheduled. Please send to [facility] on file. traMADol (ULTRAM) 50 MG tablet [[id number]]     Order Details  Dose: 50 mg Route: Oral Frequency: EVERY 8 HOURS PRN for Pain   Dispense Quantity: 90 tablet Refills: 0    Note to Pharmacy: Reduce doses taken as pain becomes manageable         Sig: Take 1 tablet by mouth every 8 hours as needed for Pain for up to 30 days. Intended supply: 7 days.  Take lowest dose possible to manage pain Max Daily Amount: 150 mg

## 2023-06-09 RX ORDER — TRAMADOL HYDROCHLORIDE 50 MG/1
50 TABLET ORAL EVERY 8 HOURS PRN
Qty: 90 TABLET | Refills: 0 | Status: SHIPPED | OUTPATIENT
Start: 2023-06-09 | End: 2023-07-09

## 2023-06-11 RX ORDER — DOCUSATE SODIUM 100 MG/1
CAPSULE, LIQUID FILLED ORAL
Qty: 180 CAPSULE | Refills: 0 | Status: SHIPPED | OUTPATIENT
Start: 2023-06-11

## 2023-06-11 RX ORDER — ASPIRIN 81 MG/1
TABLET, CHEWABLE ORAL
Qty: 90 TABLET | Refills: 0 | Status: SHIPPED | OUTPATIENT
Start: 2023-06-11

## 2023-06-19 RX ORDER — OMEPRAZOLE 20 MG/1
CAPSULE, DELAYED RELEASE ORAL
Qty: 90 CAPSULE | Refills: 0 | OUTPATIENT
Start: 2023-06-19

## 2023-06-19 RX ORDER — TRAZODONE HYDROCHLORIDE 50 MG/1
TABLET ORAL
Qty: 180 TABLET | Refills: 0 | Status: SHIPPED | OUTPATIENT
Start: 2023-06-19

## 2023-06-19 RX ORDER — SIMVASTATIN 10 MG
TABLET ORAL
Qty: 90 TABLET | Refills: 0 | Status: SHIPPED | OUTPATIENT
Start: 2023-06-19

## 2023-06-21 ENCOUNTER — OFFICE VISIT (OUTPATIENT)
Age: 68
End: 2023-06-21

## 2023-06-21 VITALS — BODY MASS INDEX: 24.98 KG/M2 | WEIGHT: 141 LBS | HEIGHT: 63 IN

## 2023-06-21 DIAGNOSIS — M75.101 ROTATOR CUFF SYNDROME, RIGHT: ICD-10-CM

## 2023-06-21 DIAGNOSIS — M25.512 CHRONIC LEFT SHOULDER PAIN: ICD-10-CM

## 2023-06-21 DIAGNOSIS — M25.511 CHRONIC RIGHT SHOULDER PAIN: Primary | ICD-10-CM

## 2023-06-21 DIAGNOSIS — G89.29 CHRONIC RIGHT SHOULDER PAIN: Primary | ICD-10-CM

## 2023-06-21 DIAGNOSIS — G89.29 CHRONIC LEFT SHOULDER PAIN: ICD-10-CM

## 2023-06-21 DIAGNOSIS — M75.102 ROTATOR CUFF SYNDROME, LEFT: ICD-10-CM

## 2023-06-21 RX ORDER — TRIAMCINOLONE ACETONIDE 40 MG/ML
40 INJECTION, SUSPENSION INTRA-ARTICULAR; INTRAMUSCULAR ONCE
Status: COMPLETED | OUTPATIENT
Start: 2023-06-21 | End: 2023-06-21

## 2023-06-21 RX ADMIN — TRIAMCINOLONE ACETONIDE 40 MG: 40 INJECTION, SUSPENSION INTRA-ARTICULAR; INTRAMUSCULAR at 15:14
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
## 2023-06-30 ENCOUNTER — TELEPHONE (OUTPATIENT)
Facility: CLINIC | Age: 68
End: 2023-06-30

## 2023-06-30 ENCOUNTER — OFFICE VISIT (OUTPATIENT)
Age: 68
End: 2023-06-30

## 2023-06-30 VITALS — WEIGHT: 142.4 LBS | HEIGHT: 63 IN | BODY MASS INDEX: 25.23 KG/M2

## 2023-06-30 DIAGNOSIS — M54.9 CHRONIC MIDLINE BACK PAIN, UNSPECIFIED BACK LOCATION: ICD-10-CM

## 2023-06-30 DIAGNOSIS — M70.61 TROCHANTERIC BURSITIS OF RIGHT HIP: ICD-10-CM

## 2023-06-30 DIAGNOSIS — M79.671 RIGHT FOOT PAIN: Primary | ICD-10-CM

## 2023-06-30 DIAGNOSIS — M70.62 TROCHANTERIC BURSITIS OF LEFT HIP: ICD-10-CM

## 2023-06-30 DIAGNOSIS — G89.29 CHRONIC MIDLINE BACK PAIN, UNSPECIFIED BACK LOCATION: ICD-10-CM

## 2023-06-30 DIAGNOSIS — H26.9 ACQUIRED CATARACT: Primary | ICD-10-CM

## 2023-06-30 RX ORDER — MELOXICAM 15 MG/1
15 TABLET ORAL DAILY
Qty: 30 TABLET | Refills: 2 | Status: SHIPPED | OUTPATIENT
Start: 2023-06-30 | End: 2023-09-28

## 2023-06-30 RX ORDER — ACETAMINOPHEN 500 MG
1000 TABLET ORAL EVERY 8 HOURS
Qty: 180 TABLET | Refills: 0 | Status: SHIPPED | OUTPATIENT
Start: 2023-06-30 | End: 2023-07-30

## 2023-06-30 RX ORDER — LIDOCAINE 50 MG/G
1 PATCH TOPICAL DAILY
Qty: 30 PATCH | Refills: 0 | Status: SHIPPED | OUTPATIENT
Start: 2023-06-30 | End: 2023-07-30

## 2023-07-10 DIAGNOSIS — M51.36 DDD (DEGENERATIVE DISC DISEASE), LUMBAR: ICD-10-CM

## 2023-07-13 RX ORDER — TRAMADOL HYDROCHLORIDE 50 MG/1
TABLET ORAL
Qty: 90 TABLET | Refills: 0 | Status: SHIPPED | OUTPATIENT
Start: 2023-07-13 | End: 2023-08-12

## 2023-07-18 RX ORDER — OMEPRAZOLE 20 MG/1
CAPSULE, DELAYED RELEASE ORAL
Qty: 90 CAPSULE | Refills: 0 | Status: SHIPPED | OUTPATIENT
Start: 2023-07-18

## 2023-07-24 RX ORDER — OMEPRAZOLE 20 MG/1
CAPSULE, DELAYED RELEASE ORAL
Qty: 90 CAPSULE | Refills: 0 | Status: SHIPPED | OUTPATIENT
Start: 2023-07-24

## 2023-08-08 RX ORDER — BACLOFEN 10 MG/1
TABLET ORAL
Qty: 90 TABLET | Refills: 2 | Status: SHIPPED | OUTPATIENT
Start: 2023-08-08

## 2023-08-12 DIAGNOSIS — M51.36 DDD (DEGENERATIVE DISC DISEASE), LUMBAR: ICD-10-CM

## 2023-08-14 ENCOUNTER — TELEPHONE (OUTPATIENT)
Facility: CLINIC | Age: 68
End: 2023-08-14

## 2023-08-14 NOTE — TELEPHONE ENCOUNTER
traMADol (ULTRAM) 50 MG tablet    Future Appointments   Date Time Provider [address]   8/15/2023 10:00 AM Christiana Bueno MD [facility] BS AMB   5/14/2024  3:30  [address] [facility] STEREO BX RM [address]

## 2023-08-15 ENCOUNTER — OFFICE VISIT (OUTPATIENT)
Age: 68
End: 2023-08-15
Payer: COMMERCIAL

## 2023-08-15 VITALS — WEIGHT: 142 LBS | HEIGHT: 63 IN | BODY MASS INDEX: 25.16 KG/M2 | HEART RATE: 60 BPM

## 2023-08-15 DIAGNOSIS — G89.29 CHRONIC BILATERAL LOW BACK PAIN WITH BILATERAL SCIATICA: ICD-10-CM

## 2023-08-15 DIAGNOSIS — M70.61 TROCHANTERIC BURSITIS OF RIGHT HIP: Primary | ICD-10-CM

## 2023-08-15 DIAGNOSIS — M54.42 CHRONIC BILATERAL LOW BACK PAIN WITH BILATERAL SCIATICA: ICD-10-CM

## 2023-08-15 DIAGNOSIS — M54.41 CHRONIC BILATERAL LOW BACK PAIN WITH BILATERAL SCIATICA: ICD-10-CM

## 2023-08-15 PROCEDURE — 1123F ACP DISCUSS/DSCN MKR DOCD: CPT | Performed by: PHYSICAL MEDICINE & REHABILITATION

## 2023-08-15 PROCEDURE — 99203 OFFICE O/P NEW LOW 30 MIN: CPT | Performed by: PHYSICAL MEDICINE & REHABILITATION

## 2023-08-15 RX ORDER — TRAMADOL HYDROCHLORIDE 50 MG/1
50 TABLET ORAL 2 TIMES DAILY
COMMUNITY
Start: 2023-07-13

## 2023-08-15 RX ORDER — MONTELUKAST SODIUM 10 MG/1
10 TABLET ORAL NIGHTLY
COMMUNITY
Start: 2023-07-14

## 2023-08-15 RX ORDER — TRAMADOL HYDROCHLORIDE 50 MG/1
TABLET ORAL
Qty: 90 TABLET | Refills: 0 | Status: SHIPPED | OUTPATIENT
Start: 2023-08-15 | End: 2023-09-14

## 2023-08-15 ASSESSMENT — PATIENT HEALTH QUESTIONNAIRE - PHQ9
2. FEELING DOWN, DEPRESSED OR HOPELESS: 1
1. LITTLE INTEREST OR PLEASURE IN DOING THINGS: 1
SUM OF ALL RESPONSES TO PHQ9 QUESTIONS 1 & 2: 2
SUM OF ALL RESPONSES TO PHQ QUESTIONS 1-9: 2
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
## 2023-08-15 NOTE — PROGRESS NOTES
Paty Lenz presents today for   Chief Complaint   Patient presents with    Back Pain    Hip Pain     Bilateral       Is someone accompanying this pt? no    Is the patient using any DME equipment during OV? no    Depression Screening:  No flowsheet data found. Learning Assessment:  No flowsheet data found. Abuse Screening:  No flowsheet data found. Fall Risk  No flowsheet data found. OPIOID RISK TOOL  No flowsheet data found. Coordination of Care:  1. Have you been to the ER, urgent care clinic since your last visit? Yes withdrawal from tramdol Hospitalized since your last visit? no    2. Have you seen or consulted any other health care providers outside of the [address] [place] [address] since your last visit? no Include any pap smears or colon screening.  no

## 2023-08-29 RX ORDER — ASPIRIN 81 MG
TABLET,CHEWABLE ORAL
Qty: 90 TABLET | Refills: 0 | Status: SHIPPED | OUTPATIENT
Start: 2023-08-29

## 2023-08-29 RX ORDER — DOCUSATE SODIUM 100 MG/1
CAPSULE, LIQUID FILLED ORAL
Qty: 180 CAPSULE | Refills: 0 | Status: SHIPPED | OUTPATIENT
Start: 2023-08-29

## 2023-09-04 DIAGNOSIS — M70.61 TROCHANTERIC BURSITIS OF RIGHT HIP: ICD-10-CM

## 2023-09-04 DIAGNOSIS — M70.62 TROCHANTERIC BURSITIS OF LEFT HIP: ICD-10-CM

## 2023-09-05 RX ORDER — MELOXICAM 15 MG/1
TABLET ORAL
Qty: 30 TABLET | Refills: 2 | Status: SHIPPED | OUTPATIENT
Start: 2023-09-05

## 2023-09-07 DIAGNOSIS — G89.29 OTHER CHRONIC PAIN: Primary | ICD-10-CM

## 2023-09-07 RX ORDER — LINACLOTIDE 145 UG/1
CAPSULE, GELATIN COATED ORAL
Qty: 90 CAPSULE | Refills: 3 | Status: SHIPPED | OUTPATIENT
Start: 2023-09-07

## 2023-09-07 NOTE — TELEPHONE ENCOUNTER
Pt requesting refill. Last ov5/11/23, no future appts. traMADol (ULTRAM) 50 MG tablet [[id number]]     Order Details  Dose, Route, Frequency: As Directed   Dispense Quantity: 90 tablet Refills: 0    Note to Pharmacy: Reduce doses taken as pain becomes manageable         Sig: take 1 tablet by mouth every 8 hours AS NEEDED FOR PAIN for up to [address] TO MANAGE PAIN.  MAX DAILY AMOUNT OF 3 TABLETS

## 2023-09-14 RX ORDER — TRAMADOL HYDROCHLORIDE 50 MG/1
TABLET ORAL
Qty: 90 TABLET | Refills: 0 | Status: SHIPPED | OUTPATIENT
Start: 2023-09-14 | End: 2023-10-14

## 2023-09-19 RX ORDER — TRAZODONE HYDROCHLORIDE 50 MG/1
TABLET ORAL
Qty: 180 TABLET | Refills: 0 | Status: SHIPPED | OUTPATIENT
Start: 2023-09-19

## 2023-09-19 RX ORDER — SIMVASTATIN 10 MG
TABLET ORAL
Qty: 90 TABLET | Refills: 0 | Status: SHIPPED | OUTPATIENT
Start: 2023-09-19

## 2023-09-21 RX ORDER — TRAMADOL HYDROCHLORIDE 50 MG/1
50 TABLET ORAL 2 TIMES DAILY
Qty: 60 TABLET | Refills: 0 | Status: SHIPPED | OUTPATIENT
Start: 2023-09-21 | End: 2023-10-21

## 2023-10-15 DIAGNOSIS — G89.29 OTHER CHRONIC PAIN: ICD-10-CM

## 2023-10-17 RX ORDER — DOCUSATE SODIUM 100 MG/1
CAPSULE, LIQUID FILLED ORAL
Qty: 180 CAPSULE | Refills: 0 | Status: SHIPPED | OUTPATIENT
Start: 2023-10-17

## 2023-10-18 DIAGNOSIS — G89.29 OTHER CHRONIC PAIN: ICD-10-CM

## 2023-10-18 NOTE — TELEPHONE ENCOUNTER
Patient called refill request for the following medications. One Medication I didn't see. Trielegy for COPD              traMADol (ULTRAM) 50 MG tablet [[id number]]     Order Details  Dose: 50 mg Route: Oral Frequency: 2 TIMES DAILY   Dispense Quantity: 60 tablet Refills: 0    Note to Pharmacy: Reduce doses taken as pain becomes manageable         Sig: Take 1 tablet by mouth 2 times daily for 30 days.  Max Daily Amount: 100 mg

## 2023-10-19 RX ORDER — TRAMADOL HYDROCHLORIDE 50 MG/1
TABLET ORAL
Qty: 60 TABLET | Refills: 0 | OUTPATIENT
Start: 2023-10-19 | End: 2023-11-18

## 2023-10-19 RX ORDER — FLUTICASONE FUROATE, UMECLIDINIUM BROMIDE AND VILANTEROL TRIFENATATE 200; 62.5; 25 UG/1; UG/1; UG/1
1 POWDER RESPIRATORY (INHALATION) DAILY
Qty: 28 EACH | Refills: 3 | Status: SHIPPED | OUTPATIENT
Start: 2023-10-19

## 2023-10-19 RX ORDER — TRAMADOL HYDROCHLORIDE 50 MG/1
50 TABLET ORAL 2 TIMES DAILY
Qty: 60 TABLET | Refills: 0 | Status: SHIPPED | OUTPATIENT
Start: 2023-10-19 | End: 2023-11-18

## 2023-11-07 RX ORDER — BACLOFEN 10 MG/1
TABLET ORAL
Qty: 90 TABLET | Refills: 2 | OUTPATIENT
Start: 2023-11-07

## 2023-11-13 ENCOUNTER — TELEMEDICINE (OUTPATIENT)
Facility: CLINIC | Age: 68
End: 2023-11-13
Payer: MEDICARE

## 2023-11-13 DIAGNOSIS — Z12.11 COLON CANCER SCREENING: ICD-10-CM

## 2023-11-13 DIAGNOSIS — M54.50 CHRONIC BILATERAL LOW BACK PAIN WITHOUT SCIATICA: ICD-10-CM

## 2023-11-13 DIAGNOSIS — G89.29 CHRONIC PAIN OF BOTH SHOULDERS: Primary | ICD-10-CM

## 2023-11-13 DIAGNOSIS — M70.61 TROCHANTERIC BURSITIS OF RIGHT HIP: ICD-10-CM

## 2023-11-13 DIAGNOSIS — M25.511 CHRONIC PAIN OF BOTH SHOULDERS: Primary | ICD-10-CM

## 2023-11-13 DIAGNOSIS — M25.512 CHRONIC PAIN OF BOTH SHOULDERS: Primary | ICD-10-CM

## 2023-11-13 DIAGNOSIS — K21.9 GASTROESOPHAGEAL REFLUX DISEASE, UNSPECIFIED WHETHER ESOPHAGITIS PRESENT: ICD-10-CM

## 2023-11-13 DIAGNOSIS — F11.20 OPIOID DEPENDENCE WITH CURRENT USE (HCC): ICD-10-CM

## 2023-11-13 DIAGNOSIS — M70.62 TROCHANTERIC BURSITIS OF LEFT HIP: ICD-10-CM

## 2023-11-13 DIAGNOSIS — R13.10 DYSPHAGIA, UNSPECIFIED TYPE: ICD-10-CM

## 2023-11-13 DIAGNOSIS — G89.29 CHRONIC BILATERAL LOW BACK PAIN WITHOUT SCIATICA: ICD-10-CM

## 2023-11-13 PROCEDURE — 99214 OFFICE O/P EST MOD 30 MIN: CPT | Performed by: PHYSICIAN ASSISTANT

## 2023-11-13 PROCEDURE — 1090F PRES/ABSN URINE INCON ASSESS: CPT | Performed by: PHYSICIAN ASSISTANT

## 2023-11-13 PROCEDURE — 3017F COLORECTAL CA SCREEN DOC REV: CPT | Performed by: PHYSICIAN ASSISTANT

## 2023-11-13 PROCEDURE — G8427 DOCREV CUR MEDS BY ELIG CLIN: HCPCS | Performed by: PHYSICIAN ASSISTANT

## 2023-11-13 PROCEDURE — G8399 PT W/DXA RESULTS DOCUMENT: HCPCS | Performed by: PHYSICIAN ASSISTANT

## 2023-11-13 PROCEDURE — 1123F ACP DISCUSS/DSCN MKR DOCD: CPT | Performed by: PHYSICIAN ASSISTANT

## 2023-11-13 RX ORDER — TRAMADOL HYDROCHLORIDE 50 MG/1
50 TABLET ORAL EVERY 8 HOURS PRN
Qty: 90 TABLET | Refills: 0 | Status: SHIPPED | OUTPATIENT
Start: 2023-11-13 | End: 2023-12-13

## 2023-11-13 RX ORDER — BACLOFEN 10 MG/1
TABLET ORAL
Qty: 90 TABLET | Refills: 2 | Status: SHIPPED | OUTPATIENT
Start: 2023-11-13

## 2023-11-13 RX ORDER — FAMOTIDINE 20 MG/1
20 TABLET, FILM COATED ORAL 2 TIMES DAILY
Qty: 180 TABLET | Refills: 0 | Status: SHIPPED | OUTPATIENT
Start: 2023-11-13

## 2023-11-13 ASSESSMENT — ENCOUNTER SYMPTOMS
ABDOMINAL PAIN: 0
BACK PAIN: 1

## 2023-11-13 NOTE — PROGRESS NOTES
1. \"Have you been to the ER, urgent care clinic since your last visit? Hospitalized since your last visit? \" No    2. \"Have you seen or consulted any other health care providers outside of the [address] [place] [address] since your last visit? \" No     3. For patients aged 43-73: Has the patient had a colonoscopy / FIT/ Cologuard? No. Desires to complete. If the patient is female:    4. For patients aged 43-66: Has the patient had a mammogram within the past 2 years? Yes - no Care Gap present      5. For patients aged 21-65: Has the patient had a pap smear?  NA - based on age or sex

## 2023-11-13 NOTE — PROGRESS NOTES
Jorge Goldstein, was evaluated through a synchronous (real-time) audio-video encounter. The patient (or guardian if applicable) is aware that this is a billable service, which includes applicable co-pays. This Virtual Visit was conducted with patient's (and/or legal guardian's) consent. Patient identification was verified, and a caregiver was present when appropriate. The patient was located at Home: [address] [place] [address]  Provider was located at Home ([address]): [facility]ly [facility], [address] Dsouza (:  1955) is a Established patient, presenting virtually for evaluation of the following:    Assessment & Plan   Below is the assessment and plan developed based on review of pertinent history, physical exam, labs, studies, and medications. 1. Chronic pain of both shoulders  2. Chronic bilateral low back pain without sciatica  3. Opioid dependence with current use (HCC)  -     traMADol (ULTRAM) 50 MG tablet; Take 1 tablet by mouth every 8 hours as needed for Pain for up to 30 days. Max Daily Amount: 150 mg, Disp-90 tablet, R-0Normal   - Seems change of Rx was unintentional. No aberrant behavior. Will refill as previously prescribed. 4. Dysphagia, unspecified type  5. Gastroesophageal reflux disease, unspecified whether esophagitis present  -     External Referral To Gastroenterology  -     famotidine (PEPCID) 20 MG tablet; Take 1 tablet by mouth 2 times daily, Disp-180 tablet, R-0Normal  - D/c OTC Latonya.  - Avoid Mobic. Use diclofenac gel instead. 6. Colon cancer screening  -     External Referral To Gastroenterology  7. Trochanteric bursitis of left hip  8. Trochanteric bursitis of right hip  -     diclofenac sodium (VOLTAREN) 1 % GEL; Apply 4 g topically 4 times daily Using for multiple joints., Topical, 4 TIMES DAILY Starting Mon 2023, Disp-500 g, R-3, Normal   - Using daily. Can increase to QID. Advised to use instead of Mobic.      Return in about 3 months

## 2023-11-28 RX ORDER — ASPIRIN 81 MG
TABLET,CHEWABLE ORAL
Qty: 90 TABLET | Refills: 0 | Status: SHIPPED | OUTPATIENT
Start: 2023-11-28

## 2023-11-30 DIAGNOSIS — M70.62 TROCHANTERIC BURSITIS OF LEFT HIP: ICD-10-CM

## 2023-11-30 DIAGNOSIS — M70.61 TROCHANTERIC BURSITIS OF RIGHT HIP: ICD-10-CM

## 2023-11-30 RX ORDER — MELOXICAM 15 MG/1
TABLET ORAL
Qty: 30 TABLET | Refills: 2 | Status: SHIPPED | OUTPATIENT
Start: 2023-11-30

## 2024-02-18 RX ORDER — TRAZODONE HYDROCHLORIDE 50 MG/1
TABLET ORAL
Qty: 60 TABLET | Refills: 0 | Status: SHIPPED | OUTPATIENT
Start: 2024-02-18

## 2024-02-18 RX ORDER — FLUTICASONE FUROATE, UMECLIDINIUM BROMIDE AND VILANTEROL TRIFENATATE 200; 62.5; 25 UG/1; UG/1; UG/1
1 POWDER RESPIRATORY (INHALATION) DAILY
Qty: 60 EACH | Refills: 0 | Status: SHIPPED | OUTPATIENT
Start: 2024-02-18

## 2024-02-22 ENCOUNTER — OFFICE VISIT (OUTPATIENT)
Facility: CLINIC | Age: 69
End: 2024-02-22
Payer: MEDICARE

## 2024-02-22 ENCOUNTER — HOSPITAL ENCOUNTER (OUTPATIENT)
Facility: HOSPITAL | Age: 69
Setting detail: SPECIMEN
Discharge: HOME | End: 2024-02-22
Payer: MEDICARE

## 2024-02-22 VITALS
SYSTOLIC BLOOD PRESSURE: 130 MMHG | WEIGHT: 134.8 LBS | BODY MASS INDEX: 23.88 KG/M2 | DIASTOLIC BLOOD PRESSURE: 78 MMHG | OXYGEN SATURATION: 97 % | HEART RATE: 73 BPM | HEIGHT: 63 IN | RESPIRATION RATE: 18 BRPM | TEMPERATURE: 97.7 F

## 2024-02-22 DIAGNOSIS — F11.20 OPIOID DEPENDENCE WITH CURRENT USE (HCC): ICD-10-CM

## 2024-02-22 DIAGNOSIS — F32.1 CURRENT MODERATE EPISODE OF MAJOR DEPRESSIVE DISORDER, UNSPECIFIED WHETHER RECURRENT (HCC): ICD-10-CM

## 2024-02-22 DIAGNOSIS — Z12.12 ENCOUNTER FOR COLORECTAL CANCER SCREENING: ICD-10-CM

## 2024-02-22 DIAGNOSIS — F33.42 RECURRENT MAJOR DEPRESSIVE DISORDER, IN FULL REMISSION (HCC): ICD-10-CM

## 2024-02-22 DIAGNOSIS — J44.9 CHRONIC OBSTRUCTIVE PULMONARY DISEASE, UNSPECIFIED COPD TYPE (HCC): ICD-10-CM

## 2024-02-22 DIAGNOSIS — Z76.89 ENCOUNTER TO ESTABLISH CARE WITH NEW DOCTOR: ICD-10-CM

## 2024-02-22 DIAGNOSIS — Z12.11 ENCOUNTER FOR COLORECTAL CANCER SCREENING: ICD-10-CM

## 2024-02-22 DIAGNOSIS — Z23 ENCOUNTER FOR IMMUNIZATION: ICD-10-CM

## 2024-02-22 DIAGNOSIS — M70.61 TROCHANTERIC BURSITIS OF RIGHT HIP: ICD-10-CM

## 2024-02-22 DIAGNOSIS — Z76.89 ENCOUNTER TO ESTABLISH CARE WITH NEW DOCTOR: Primary | ICD-10-CM

## 2024-02-22 DIAGNOSIS — K21.9 GASTROESOPHAGEAL REFLUX DISEASE WITHOUT ESOPHAGITIS: ICD-10-CM

## 2024-02-22 DIAGNOSIS — E55.9 VITAMIN D DEFICIENCY: ICD-10-CM

## 2024-02-22 DIAGNOSIS — M70.62 TROCHANTERIC BURSITIS OF LEFT HIP: ICD-10-CM

## 2024-02-22 LAB
ALBUMIN SERPL-MCNC: 3.8 G/DL (ref 3.4–5)
ALBUMIN/GLOB SERPL: 1.5 (ref 0.8–1.7)
ALP SERPL-CCNC: 70 U/L (ref 45–117)
ALT SERPL-CCNC: 11 U/L (ref 13–56)
ANION GAP SERPL CALC-SCNC: 3 MMOL/L (ref 3–18)
AST SERPL-CCNC: 14 U/L (ref 10–38)
BILIRUB SERPL-MCNC: 0.5 MG/DL (ref 0.2–1)
BUN SERPL-MCNC: 16 MG/DL (ref 7–18)
BUN/CREAT SERPL: 21 (ref 12–20)
CALCIUM SERPL-MCNC: 9 MG/DL (ref 8.5–10.1)
CHLORIDE SERPL-SCNC: 110 MMOL/L (ref 100–111)
CHOLEST SERPL-MCNC: 173 MG/DL
CO2 SERPL-SCNC: 26 MMOL/L (ref 21–32)
CREAT SERPL-MCNC: 0.76 MG/DL (ref 0.6–1.3)
ERYTHROCYTE [DISTWIDTH] IN BLOOD BY AUTOMATED COUNT: 13.7 % (ref 11.6–14.5)
EST. AVERAGE GLUCOSE BLD GHB EST-MCNC: 111 MG/DL
GLOBULIN SER CALC-MCNC: 2.6 G/DL (ref 2–4)
GLUCOSE SERPL-MCNC: 74 MG/DL (ref 74–99)
HBA1C MFR BLD: 5.5 % (ref 4.2–5.6)
HCT VFR BLD AUTO: 40.6 % (ref 35–45)
HDLC SERPL-MCNC: 81 MG/DL (ref 40–60)
HDLC SERPL: 2.1 (ref 0–5)
HGB BLD-MCNC: 13.3 G/DL (ref 12–16)
LDLC SERPL CALC-MCNC: 69.4 MG/DL (ref 0–100)
LIPID PANEL: ABNORMAL
MCH RBC QN AUTO: 29.1 PG (ref 24–34)
MCHC RBC AUTO-ENTMCNC: 32.8 G/DL (ref 31–37)
MCV RBC AUTO: 88.8 FL (ref 78–100)
NRBC # BLD: 0 K/UL (ref 0–0.01)
NRBC BLD-RTO: 0 PER 100 WBC
PLATELET # BLD AUTO: 213 K/UL (ref 135–420)
PMV BLD AUTO: 9.4 FL (ref 9.2–11.8)
POTASSIUM SERPL-SCNC: 4.2 MMOL/L (ref 3.5–5.5)
PROT SERPL-MCNC: 6.4 G/DL (ref 6.4–8.2)
RBC # BLD AUTO: 4.57 M/UL (ref 4.2–5.3)
SODIUM SERPL-SCNC: 139 MMOL/L (ref 136–145)
TRIGL SERPL-MCNC: 113 MG/DL
VLDLC SERPL CALC-MCNC: 22.6 MG/DL
WBC # BLD AUTO: 5.5 K/UL (ref 4.6–13.2)

## 2024-02-22 PROCEDURE — 3023F SPIROM DOC REV: CPT | Performed by: STUDENT IN AN ORGANIZED HEALTH CARE EDUCATION/TRAINING PROGRAM

## 2024-02-22 PROCEDURE — G0009 ADMIN PNEUMOCOCCAL VACCINE: HCPCS | Performed by: STUDENT IN AN ORGANIZED HEALTH CARE EDUCATION/TRAINING PROGRAM

## 2024-02-22 PROCEDURE — 85027 COMPLETE CBC AUTOMATED: CPT

## 2024-02-22 PROCEDURE — 83036 HEMOGLOBIN GLYCOSYLATED A1C: CPT

## 2024-02-22 PROCEDURE — 90677 PCV20 VACCINE IM: CPT | Performed by: STUDENT IN AN ORGANIZED HEALTH CARE EDUCATION/TRAINING PROGRAM

## 2024-02-22 PROCEDURE — 4004F PT TOBACCO SCREEN RCVD TLK: CPT | Performed by: STUDENT IN AN ORGANIZED HEALTH CARE EDUCATION/TRAINING PROGRAM

## 2024-02-22 PROCEDURE — 80061 LIPID PANEL: CPT

## 2024-02-22 PROCEDURE — G8427 DOCREV CUR MEDS BY ELIG CLIN: HCPCS | Performed by: STUDENT IN AN ORGANIZED HEALTH CARE EDUCATION/TRAINING PROGRAM

## 2024-02-22 PROCEDURE — G8420 CALC BMI NORM PARAMETERS: HCPCS | Performed by: STUDENT IN AN ORGANIZED HEALTH CARE EDUCATION/TRAINING PROGRAM

## 2024-02-22 PROCEDURE — 80053 COMPREHEN METABOLIC PANEL: CPT

## 2024-02-22 PROCEDURE — 1090F PRES/ABSN URINE INCON ASSESS: CPT | Performed by: STUDENT IN AN ORGANIZED HEALTH CARE EDUCATION/TRAINING PROGRAM

## 2024-02-22 PROCEDURE — G8484 FLU IMMUNIZE NO ADMIN: HCPCS | Performed by: STUDENT IN AN ORGANIZED HEALTH CARE EDUCATION/TRAINING PROGRAM

## 2024-02-22 PROCEDURE — 1123F ACP DISCUSS/DSCN MKR DOCD: CPT | Performed by: STUDENT IN AN ORGANIZED HEALTH CARE EDUCATION/TRAINING PROGRAM

## 2024-02-22 PROCEDURE — 3017F COLORECTAL CA SCREEN DOC REV: CPT | Performed by: STUDENT IN AN ORGANIZED HEALTH CARE EDUCATION/TRAINING PROGRAM

## 2024-02-22 PROCEDURE — G8399 PT W/DXA RESULTS DOCUMENT: HCPCS | Performed by: STUDENT IN AN ORGANIZED HEALTH CARE EDUCATION/TRAINING PROGRAM

## 2024-02-22 PROCEDURE — 36415 COLL VENOUS BLD VENIPUNCTURE: CPT

## 2024-02-22 RX ORDER — TRAMADOL HYDROCHLORIDE 50 MG/1
50 TABLET ORAL EVERY 6 HOURS PRN
COMMUNITY

## 2024-02-22 RX ORDER — FLUTICASONE FUROATE, UMECLIDINIUM BROMIDE AND VILANTEROL TRIFENATATE 200; 62.5; 25 UG/1; UG/1; UG/1
1 POWDER RESPIRATORY (INHALATION) DAILY
Qty: 60 EACH | Refills: 0 | Status: SHIPPED | OUTPATIENT
Start: 2024-02-22

## 2024-02-22 RX ORDER — OMEPRAZOLE 20 MG/1
20 CAPSULE, DELAYED RELEASE ORAL DAILY
COMMUNITY
End: 2024-02-22

## 2024-02-22 RX ORDER — ALBUTEROL SULFATE 0.63 MG/3ML
1 SOLUTION RESPIRATORY (INHALATION) EVERY 6 HOURS PRN
COMMUNITY

## 2024-02-22 RX ORDER — OMEPRAZOLE 20 MG/1
20 CAPSULE, DELAYED RELEASE ORAL DAILY
COMMUNITY

## 2024-02-22 NOTE — PROGRESS NOTES
History of Present Illness  Mayo Salinas is a 68 y.o. female who presents today for management of    Chief Complaint   Patient presents with    New Patient     Establishing care     CC: new patient here for healthcare maintenance    -Patient is here to establish care.   -Previous PCP: Dr. Jordan, last visit months    -Retired   -She lives at home with no one   -Pt reports good support system with family and feels safe at home.    Medical History:  Poor appetite  - months; no clear reason  - losing weight    HA  - 2 months; on crown and frontal a/w blurry vision  - denies numbness and weakness    Chronic back pain/OA  - baclofen, mobic, tramadol, voltaren gel  - previously dx'd with \"degenerative bone disease\"; a cream was prescibed    R shoulder pain  - sharp/dull pain over R scapula  - w/u in past has been negative; recent xrays     Constipation  - colace, linzess, and sometimes enema    GERD  - pepcid and omeprazole; pt plans to just take omeprazole    COPD  - trelegy, albuterol PRN  - was on singulair    HLD  - simvastatin    Insomnia  - trazadone    Vit D def?  - on supplements    ?Osteoporosis      Social  - 1/4 PPD  - no EtOH  - sober from cocaine x >20 years    Healthcare maintenance, patient reported:  Flu vacc: UTD, Jan '24  Needs all other shots  Last Colonoscopy: unknown  Last Pap: UTD  Last Mammo: 2/20/24       Social Determinants of Health     Tobacco Use: High Risk (2/22/2024)    Patient History     Smoking Tobacco Use: Every Day     Smokeless Tobacco Use: Never     Passive Exposure: Not on file   Alcohol Use: Not At Risk (2/16/2023)    AUDIT-C     Frequency of Alcohol Consumption: Never     Average Number of Drinks: Patient does not drink     Frequency of Binge Drinking: Never   Financial Resource Strain: Low Risk  (2/16/2023)    Overall Financial Resource Strain (CARDIA)     Difficulty of Paying Living Expenses: Not hard at all   Food Insecurity: Not on file (2/16/2023) 
female:    4. For patients aged 40-74: Has the patient had a mammogram within the past 2 years? yes    5. For patients aged 21-65: Has the patient had a pap smear? N/a    Health Maintenance: reviewed and discussed and ordered per Provider.    Health Maintenance Due   Topic Date Due    DTaP/Tdap/Td vaccine (1 - Tdap) Never done    Pneumococcal 65+ years Vaccine (2 - PCV) 10/17/2014    Respiratory Syncytial Virus (RSV) Pregnant or age 60 yrs+ (1 - 1-dose 60+ series) Never done    Shingles vaccine (2 of 2) 05/10/2022    Colorectal Cancer Screen  06/07/2023    Flu vaccine (1) 08/01/2023    COVID-19 Vaccine (4 - 2023-24 season) 09/01/2023    A1C test (Diabetic or Prediabetic)  10/04/2023    Lipids  10/04/2023    Annual Wellness Visit (Medicare Advantage)  01/01/2024        - GENO Cleary  [facility]  Phone: [phone]  Fax: [phone]

## 2024-02-22 NOTE — PATIENT INSTRUCTIONS
You can get your Shingles and RSV at your pharmacy.    You don't need a referral to see an optometrist; just make an appointment. [facility] is close by.    You should receive a call from the specialist in a few days. If you do not, please call this clinic for the specialist's phone number, so you can schedule yourself.    It was nice meeting you today.  Please have your labs done either immediately after this visit, or you can schedule at the  to come back for labs.  Please do your labs at least a week before your next appointment.    If you have questions or concerns, My Chart is the fastest way to get in touch with me and the clinical staff.    Please arrive to the clinic at least 10 minutes before your appointment. This will insure you have the most amount of time with the medical provider.  If you arrive after the start of your appointment, you may have to reschedule.

## 2024-02-23 DIAGNOSIS — G89.4 CHRONIC PAIN SYNDROME: Primary | ICD-10-CM

## 2024-02-23 DIAGNOSIS — F11.20 OPIOID DEPENDENCE WITH CURRENT USE (HCC): ICD-10-CM

## 2024-02-23 PROBLEM — F32.1 CURRENT MODERATE EPISODE OF MAJOR DEPRESSIVE DISORDER, UNSPECIFIED WHETHER RECURRENT (HCC): Status: ACTIVE | Noted: 2024-02-23

## 2024-02-23 NOTE — TELEPHONE ENCOUNTER
Patient called in requesting medication refill . She states the pharmacy received all but the traMADol (ULTRAM) 50 MG tablet . She states she is in pain.         Please assist, Thank you

## 2024-02-26 RX ORDER — TRAMADOL HYDROCHLORIDE 50 MG/1
50 TABLET ORAL EVERY 6 HOURS PRN
Qty: 60 TABLET | Refills: 0 | Status: SHIPPED | OUTPATIENT
Start: 2024-02-26 | End: 2024-03-27

## 2024-02-26 NOTE — TELEPHONE ENCOUNTER
Tramadol refilled to continue tx by previous physician.  Pt will need a pain contract if she wants to continue receiving narcotics from this clinic.  reviewed and was appropriate.

## 2024-03-26 DIAGNOSIS — J44.9 CHRONIC OBSTRUCTIVE PULMONARY DISEASE, UNSPECIFIED COPD TYPE (HCC): ICD-10-CM

## 2024-03-27 RX ORDER — FLUTICASONE FUROATE, UMECLIDINIUM BROMIDE AND VILANTEROL TRIFENATATE 200; 62.5; 25 UG/1; UG/1; UG/1
1 POWDER RESPIRATORY (INHALATION) DAILY
Qty: 60 EACH | Refills: 0 | OUTPATIENT
Start: 2024-03-27

## 2024-04-04 ENCOUNTER — TELEPHONE (OUTPATIENT)
Facility: CLINIC | Age: 69
End: 2024-04-04

## 2024-04-04 RX ORDER — TRAZODONE HYDROCHLORIDE 50 MG/1
TABLET ORAL
Qty: 60 TABLET | Refills: 0 | Status: CANCELLED | OUTPATIENT
Start: 2024-04-04

## 2024-04-04 NOTE — TELEPHONE ENCOUNTER
Patient is requesting a refill on the following medication below:    She stated that this was discussed during her visit with provider    Please advise    Thank you

## 2024-04-05 DIAGNOSIS — M51.26 LUMBAR DISC HERNIATION: Primary | ICD-10-CM

## 2024-04-05 NOTE — TELEPHONE ENCOUNTER
Medication requested :   Requested Prescriptions      No prescriptions requested or ordered in this encounter      PCP: Bradly Ramsay MD  LOV:  02/22/2024 NOV DMA: Visit date not found

## 2024-04-08 RX ORDER — TRAZODONE HYDROCHLORIDE 50 MG/1
TABLET ORAL
Qty: 60 TABLET | Refills: 0 | Status: SHIPPED | OUTPATIENT
Start: 2024-04-08 | End: 2024-04-12

## 2024-04-09 RX ORDER — BACLOFEN 10 MG/1
TABLET ORAL
Qty: 90 TABLET | Refills: 2 | Status: SHIPPED | OUTPATIENT
Start: 2024-04-09 | End: 2024-04-12

## 2024-04-09 NOTE — TELEPHONE ENCOUNTER
Pt also wants a refill on her     traMADol (ULTRAM) 50 MG tablets.     She wants them called into [facility]    Pt last seen 3/12/2024.   [FreeTextEntry1] : MACY\par \par Vaginal fibrosis noted, continued use of dilator encouraged.\par \par S/S of recurrence discussed in detail. \par \par Follow up in 3 months, sooner as indicated.\par \par  today

## 2024-04-11 NOTE — TELEPHONE ENCOUNTER
Patient called back to follow up on the status of med refill request for Tramadol.  Patient states the wrong medication was called in and she did not request or needthe Trazodone.    Patient also stated this medication was discussed during her last OV.  Please call patient to advise if medication will be sent to her pharmacy today.    Thank you.

## 2024-04-12 RX ORDER — TRAMADOL HYDROCHLORIDE 50 MG/1
50 TABLET ORAL EVERY 6 HOURS PRN
Qty: 28 TABLET | Refills: 0 | Status: SHIPPED | OUTPATIENT
Start: 2024-04-12 | End: 2024-04-19

## 2024-04-12 NOTE — TELEPHONE ENCOUNTER
Spoke with pt.  Discussed the last missed appointment and how important it is to keep medical appointments.  I have filled the tramadol request, but pt will receive no more narcotic refills until I see her in clinic. Pt conveyed understanding.

## 2024-04-25 ENCOUNTER — TELEPHONE (OUTPATIENT)
Facility: CLINIC | Age: 69
End: 2024-04-25

## 2024-04-25 DIAGNOSIS — M51.36 DDD (DEGENERATIVE DISC DISEASE), LUMBAR: Primary | ICD-10-CM

## 2024-04-25 DIAGNOSIS — G89.4 CHRONIC PAIN SYNDROME: ICD-10-CM

## 2024-04-25 RX ORDER — TRAMADOL HYDROCHLORIDE 50 MG/1
50 TABLET ORAL EVERY 6 HOURS PRN
Qty: 7 TABLET | Refills: 0 | Status: SHIPPED | OUTPATIENT
Start: 2024-04-25 | End: 2024-05-02

## 2024-04-25 NOTE — TELEPHONE ENCOUNTER
Patient is requesting a refill for 6 more days of the below medication, her appt is on 5/1/2024    traMADol (ULTRAM) 50 MG tablet [[id number]]       Please advise    Thank you

## 2024-05-01 ENCOUNTER — OFFICE VISIT (OUTPATIENT)
Facility: CLINIC | Age: 69
End: 2024-05-01
Payer: MEDICARE

## 2024-05-01 VITALS
SYSTOLIC BLOOD PRESSURE: 126 MMHG | OXYGEN SATURATION: 94 % | BODY MASS INDEX: 23.96 KG/M2 | DIASTOLIC BLOOD PRESSURE: 84 MMHG | HEIGHT: 63 IN | HEART RATE: 72 BPM | WEIGHT: 135.2 LBS | TEMPERATURE: 97.2 F

## 2024-05-01 DIAGNOSIS — N64.4 BREAST PAIN: Primary | ICD-10-CM

## 2024-05-01 DIAGNOSIS — Z00.00 INITIAL MEDICARE ANNUAL WELLNESS VISIT: ICD-10-CM

## 2024-05-01 DIAGNOSIS — M51.36 DDD (DEGENERATIVE DISC DISEASE), LUMBAR: ICD-10-CM

## 2024-05-01 DIAGNOSIS — G89.4 CHRONIC PAIN SYNDROME: ICD-10-CM

## 2024-05-01 PROCEDURE — 1123F ACP DISCUSS/DSCN MKR DOCD: CPT | Performed by: STUDENT IN AN ORGANIZED HEALTH CARE EDUCATION/TRAINING PROGRAM

## 2024-05-01 PROCEDURE — 99214 OFFICE O/P EST MOD 30 MIN: CPT | Performed by: STUDENT IN AN ORGANIZED HEALTH CARE EDUCATION/TRAINING PROGRAM

## 2024-05-01 PROCEDURE — G0438 PPPS, INITIAL VISIT: HCPCS | Performed by: STUDENT IN AN ORGANIZED HEALTH CARE EDUCATION/TRAINING PROGRAM

## 2024-05-01 RX ORDER — TRAMADOL HYDROCHLORIDE 50 MG/1
50 TABLET ORAL EVERY 6 HOURS PRN
Qty: 60 TABLET | Refills: 0 | Status: SHIPPED | OUTPATIENT
Start: 2024-05-01 | End: 2024-05-31

## 2024-05-01 RX ORDER — SENNA AND DOCUSATE SODIUM 50; 8.6 MG/1; MG/1
1 TABLET, FILM COATED ORAL DAILY
COMMUNITY
End: 2024-05-01 | Stop reason: SDUPTHER

## 2024-05-01 RX ORDER — SENNA AND DOCUSATE SODIUM 50; 8.6 MG/1; MG/1
1 TABLET, FILM COATED ORAL DAILY
Qty: 90 TABLET | Refills: 3 | Status: SHIPPED | OUTPATIENT
Start: 2024-05-01

## 2024-05-01 RX ORDER — NALOXONE HYDROCHLORIDE 4 MG/.1ML
1 SPRAY NASAL PRN
Qty: 2 EACH | Refills: 0 | Status: SHIPPED | OUTPATIENT
Start: 2024-05-01

## 2024-05-01 SDOH — ECONOMIC STABILITY: FOOD INSECURITY: WITHIN THE PAST 12 MONTHS, YOU WORRIED THAT YOUR FOOD WOULD RUN OUT BEFORE YOU GOT MONEY TO BUY MORE.: SOMETIMES TRUE

## 2024-05-01 SDOH — ECONOMIC STABILITY: FOOD INSECURITY: WITHIN THE PAST 12 MONTHS, THE FOOD YOU BOUGHT JUST DIDN'T LAST AND YOU DIDN'T HAVE MONEY TO GET MORE.: SOMETIMES TRUE

## 2024-05-01 SDOH — ECONOMIC STABILITY: TRANSPORTATION INSECURITY
IN THE PAST 12 MONTHS, HAS LACK OF TRANSPORTATION KEPT YOU FROM MEETINGS, WORK, OR FROM GETTING THINGS NEEDED FOR DAILY LIVING?: YES

## 2024-05-01 SDOH — ECONOMIC STABILITY: INCOME INSECURITY: HOW HARD IS IT FOR YOU TO PAY FOR THE VERY BASICS LIKE FOOD, HOUSING, MEDICAL CARE, AND HEATING?: NOT HARD AT ALL

## 2024-05-01 SDOH — HEALTH STABILITY: PHYSICAL HEALTH: ON AVERAGE, HOW MANY DAYS PER WEEK DO YOU ENGAGE IN MODERATE TO STRENUOUS EXERCISE (LIKE A BRISK WALK)?: 3 DAYS

## 2024-05-01 SDOH — HEALTH STABILITY: PHYSICAL HEALTH: ON AVERAGE, HOW MANY MINUTES DO YOU ENGAGE IN EXERCISE AT THIS LEVEL?: 30 MIN

## 2024-05-01 ASSESSMENT — LIFESTYLE VARIABLES
HOW OFTEN DO YOU HAVE A DRINK CONTAINING ALCOHOL: 1
HOW OFTEN DO YOU HAVE A DRINK CONTAINING ALCOHOL: NEVER
HOW MANY STANDARD DRINKS CONTAINING ALCOHOL DO YOU HAVE ON A TYPICAL DAY: 0
HOW OFTEN DO YOU HAVE SIX OR MORE DRINKS ON ONE OCCASION: 1
HOW MANY STANDARD DRINKS CONTAINING ALCOHOL DO YOU HAVE ON A TYPICAL DAY: PATIENT DOES NOT DRINK

## 2024-05-01 ASSESSMENT — PATIENT HEALTH QUESTIONNAIRE - PHQ9
SUM OF ALL RESPONSES TO PHQ QUESTIONS 1-9: 2
SUM OF ALL RESPONSES TO PHQ QUESTIONS 1-9: 2
2. FEELING DOWN, DEPRESSED OR HOPELESS: NOT AT ALL
SUM OF ALL RESPONSES TO PHQ QUESTIONS 1-9: 2
1. LITTLE INTEREST OR PLEASURE IN DOING THINGS: MORE THAN HALF THE DAYS
SUM OF ALL RESPONSES TO PHQ QUESTIONS 1-9: 2
SUM OF ALL RESPONSES TO PHQ9 QUESTIONS 1 & 2: 2

## 2024-05-01 NOTE — PROGRESS NOTES
Mayo Salinas (:  1955) is a 68 y.o. female here for evaluation of the following chief complaint(s):  No chief complaint on file.        ASSESSMENT/PLAN:  1. Breast pain  Assessment & Plan:  - possible fibrocystic changes, mastitis; doubt neoplasm given recent negative mammogram  - US and antiinflamms   Orders:  -     US BREAST LIMITED RIGHT; Future  2. Chronic pain syndrome  Assessment & Plan:  - no changes; stable on tramadol   -  reviewed, no concerns  - pt signed pain contract   Orders:  -     naloxone 4 MG/0.1ML LIQD nasal spray; 1 spray by Nasal route as needed for Opioid Reversal, Disp-2 each, R-0Any suitable substitute is permittedNormal  -     traMADol (ULTRAM) 50 MG tablet; Take 1 tablet by mouth every 6 hours as needed for Pain for up to 30 days. Take lowest dose possible to manage pain Max Daily Amount: 200 mg, Disp-60 tablet, R-0Normal  -     sennosides-docusate sodium (SENOKOT-S) 8.6-50 MG tablet; Take 1 tablet by mouth daily, Disp-90 tablet, R-3Normal  3. DDD (degenerative disc disease), lumbar  Assessment & Plan:  - no changes; stable on tramadol   Orders:  -     traMADol (ULTRAM) 50 MG tablet; Take 1 tablet by mouth every 6 hours as needed for Pain for up to 30 days. Take lowest dose possible to manage pain Max Daily Amount: 200 mg, Disp-60 tablet, R-0Normal        Follow-up and Dispositions    Return in 1 month (on 2024) for breast pain.         SUBJECTIVE/OBJECTIVE:  HPI  Lab results  - A1C 5.5    Breast discomfort  - R breast and nodule in arm pit x a few days  - denies fever, nipple discharge, night sweats, skin changes  - mammogram in February BIRADS 1    ROS as stated above.    /84 (Site: Right Upper Arm, Position: Sitting, Cuff Size: Small Adult)   Pulse 72   Temp 97.2 °F (36.2 °C)   Ht 1.6 m (5' 3\")   Wt 61.3 kg (135 lb 3.2 oz)   SpO2 94%   BMI 23.95 kg/m²     Physical Exam  Constitutional:       Appearance: She is not ill-appearing.   Cardiovascular:    
Medicare Annual Wellness Visit    Mayo Salinas is here for No chief complaint on file.    Assessment & Plan   Initial Medicare annual wellness visit  Recommendations for Preventive Services Due: see orders and patient instructions/AVS.  Recommended screening schedule for the next 5-10 years is provided to the patient in written form: see Patient Instructions/AVS.     Return in 1 month (on 6/1/2024).     Subjective       Patient's complete Health Risk Assessment and screening values have been reviewed and are found in Flowsheets. The following problems were reviewed today and where indicated follow up appointments were made and/or referrals ordered.    Positive Risk Factor Screenings with Interventions:          Drug Use:    DAST-10 Score: 1   Interpretation:  1-2: Low level - Monitor, re-assess at a later date  3-5: Moderate level - Further Investigation  6-8: Substantial level - Intensive Assessment  9-10: Severe level - Intensive Assessment  Interventions:  Patient comments: tramadol for chronic back pain    Controlled Medication Review:      Today's Pain Level: Pain Score: Zero       Opioid Risk: (High risk score ?55) Opioid risk score: 58      Last PDMP [place] as Reviewed:  Review User Review Instant Review Result   JUAN DIEGO KINGSTON 5/1/2024 10:45 AM @   Reviewed PDMP [1]     Last Controlled Substance Monitoring Documentation      Flowsheet Row Office Visit from 5/1/2024 in [facility]   Periodic Controlled Substance Monitoring Possible medication side effects, risk of tolerance/dependence & alternative treatments discussed. filed at 05/01/2024 1050   Acute Pain Prescriptions Prescription exceeds daily limit for a specific reason. See comments or note.  [pain therapy for over 2 years] filed at 05/01/2024 1050   All MEDD Reviewed of previous treatment and response to treatment, patients adherence to medication and non-medication treatment, Treatment Plan documented (Diagnosis, Goals, 
female:    4. For patients aged 40-74: Has the patient had a mammogram within the past 2 years? yes    5. For patients aged 21-65: Has the patient had a pap smear? N/a    Health Maintenance: reviewed and discussed and ordered per Provider.    Health Maintenance Due   Topic Date Due    DTaP/Tdap/Td vaccine (1 - Tdap) Never done    Respiratory Syncytial Virus (RSV) Pregnant or age 60 yrs+ (1 - 1-dose 60+ series) Never done    Shingles vaccine (2 of 2) 05/10/2022    Colorectal Cancer Screen  06/07/2023    COVID-19 Vaccine (4 - 2023-24 season) 09/01/2023    Annual Wellness Visit (Medicare Advantage)  01/01/2024        - GENO Cleary  [facility]  Phone: [phone]  Fax: [phone]

## 2024-05-01 NOTE — PATIENT INSTRUCTIONS
Please call the following phone number to schedule the colonoscopy:  [facility]  Phone# [phone]  Fax# [phone]    You do not need to take both famotidine and omeprazole. Take whichever works.     I suggest regular use of dietary fiber, which includes Benefiber and Metamucil.      Please call the provided number to schedule the ultrasound for your breast.     Substance Use Disorder: Care Instructions  Overview     You can improve your life and health by stopping your use of alcohol or drugs. When you don't drink or use drugs, you may feel and sleep better. You may get along better with your family, friends, and coworkers. There are medicines and programs that can help with substance use disorder.  How can you care for yourself at home?  Here are some ways to help you stay sober and prevent relapse.  If you have been given medicine to help keep you sober or reduce your cravings, be sure to take it exactly as prescribed.  Talk to your doctor about programs that can help you stop using drugs or drinking alcohol.  Do not keep alcohol or drugs in your home.  Plan ahead. Think about what you'll say if other people ask you to drink or use drugs. Try not to spend time with people who drink or use drugs.  Use the time and money spent on drinking or drugs to do something that's important to you.  Preventing a relapse  Have a plan to deal with relapse. Learn to recognize changes in your thinking that lead you to drink or use drugs. Get help before you start to drink or use drugs again.  Try to stay away from situations, friends, or places that may lead you to drink or use drugs.  If you feel the need to drink alcohol or use drugs again, seek help right away. Call a trusted friend or family member. Some people get support from organizations such as Narcotics Anonymous or [employer] or from treatment facilities.  If you relapse, get help as soon as you can. Some people make a plan with another person that

## 2024-05-02 ENCOUNTER — CLINICAL DOCUMENTATION (OUTPATIENT)
Dept: SPIRITUAL SERVICES | Age: 69
End: 2024-05-02

## 2024-05-02 NOTE — PROGRESS NOTES
Advance Care Planning   Ambulatory ACP Specialist Patient Outreach    Date:  5/2/2024    ACP Specialist:  Lacey Lion    Outreach call to patient in follow-up to ACP Specialist referral from:Bradly Ramsay MD    [x] PCP  [] Provider   [] Ambulatory Care Management [] Other     For:                  [x] Advance Directive Assistance              [x] Complete Portable DNR order              [] Complete POST/POLST/MOST              [x] Code Status Discussion             [] Discuss Goals of Care             [x] Early ACP Decision-Making              [] Other (Specify)    Date Referral Received: 5/1/24    Next Step:   [] ACP scheduled conversation  [x] Outreach again in one week               [] Email / Mail ACP Info Sheets  [] Email / Mail Advance Directive   [] Closing referral.  Routing closure to referring provider/staff and to ACP Specialist .    [] Closure letter mailed to patient with invitation to contact ACP Specialist if / when ready.   [] Other (Specify here):         [x] At this time, Healthcare Decision Maker Is:  Advance Care Planning   Healthcare Decision Maker:    Primary Decision Maker: Bernice Quan  Child - [phone]    Primary Decision Maker: AvelinaMylesthomas - Child - [phone]          [] Primary agent named in scanned advance directive.    [x] Legal Next of Kin.     [] Unable to determine legal decision maker at this time.         Outreaches:         [x] 1st -  Date:  5/2/24               Intervention:  [] Spoke with Patient   [x] Left Voice mail [] Email / Mail    [] MyChart  [] Other (Specify) :     Outcomes:  Outreach phone call to the patient on home phone number which is also listed as the mobile number.  Unable to reach, left voicemail message with call back information.  Will attempt to follow up in one week.           [] 2nd -  Date:                 Intervention:  [] Spoke with Patient  [] Left Voice mail [] Email / Mail    [] MyChart  [] Other (Specify) :

## 2024-05-02 NOTE — ASSESSMENT & PLAN NOTE
- possible fibrocystic changes, mastitis; doubt neoplasm given recent negative mammogram  - US and antiinflamms

## 2024-05-07 DIAGNOSIS — N64.4 BREAST PAIN: Primary | ICD-10-CM

## 2024-05-10 ENCOUNTER — CLINICAL DOCUMENTATION (OUTPATIENT)
Dept: SPIRITUAL SERVICES | Age: 69
End: 2024-05-10

## 2024-05-23 ENCOUNTER — HOSPITAL ENCOUNTER (OUTPATIENT)
Facility: HOSPITAL | Age: 69
End: 2024-05-23
Payer: MEDICARE

## 2024-05-23 ENCOUNTER — CLINICAL DOCUMENTATION (OUTPATIENT)
Dept: SPIRITUAL SERVICES | Age: 69
End: 2024-05-23

## 2024-05-23 ENCOUNTER — HOSPITAL ENCOUNTER (OUTPATIENT)
Dept: WOMENS IMAGING | Facility: HOSPITAL | Age: 69
Discharge: HOME | End: 2024-05-23
Payer: MEDICARE

## 2024-05-23 DIAGNOSIS — N64.4 BREAST PAIN: ICD-10-CM

## 2024-05-23 PROCEDURE — 76642 ULTRASOUND BREAST LIMITED: CPT

## 2024-05-28 ENCOUNTER — TELEPHONE (OUTPATIENT)
Facility: CLINIC | Age: 69
End: 2024-05-28

## 2024-05-28 DIAGNOSIS — F11.20 OPIOID DEPENDENCE WITH CURRENT USE (HCC): Primary | ICD-10-CM

## 2024-05-28 DIAGNOSIS — M51.36 DDD (DEGENERATIVE DISC DISEASE), LUMBAR: ICD-10-CM

## 2024-05-28 DIAGNOSIS — Z02.89 PAIN MANAGEMENT CONTRACT AGREEMENT: ICD-10-CM

## 2024-05-28 DIAGNOSIS — G89.4 CHRONIC PAIN SYNDROME: ICD-10-CM

## 2024-05-28 RX ORDER — TRAMADOL HYDROCHLORIDE 50 MG/1
50 TABLET ORAL EVERY 12 HOURS PRN
Qty: 60 TABLET | Refills: 0 | Status: SHIPPED | OUTPATIENT
Start: 2024-05-28 | End: 2024-06-27

## 2024-05-28 NOTE — TELEPHONE ENCOUNTER
MEDICATION REFILL REQUESTS      traMADol (ULTRAM) 50 MG tablet       baclofen (LIORESAL) 10 MG tablet   Pt requested a refill she states she has a few left, however this is not currently on her medication list

## 2024-05-28 NOTE — TELEPHONE ENCOUNTER
Will refill tramadol to last her 1 month.    We have never discussed me managing her baclofen. She may need to see pain management for the baclofen.

## 2024-06-19 ENCOUNTER — HOSPITAL ENCOUNTER (OUTPATIENT)
Facility: HOSPITAL | Age: 69
Setting detail: SPECIMEN
Discharge: HOME | End: 2024-06-22
Payer: MEDICARE

## 2024-06-19 ENCOUNTER — OFFICE VISIT (OUTPATIENT)
Facility: CLINIC | Age: 69
End: 2024-06-19
Payer: MEDICARE

## 2024-06-19 VITALS
BODY MASS INDEX: 24.38 KG/M2 | HEART RATE: 74 BPM | WEIGHT: 137.6 LBS | OXYGEN SATURATION: 95 % | TEMPERATURE: 97.7 F | HEIGHT: 63 IN | RESPIRATION RATE: 17 BRPM | SYSTOLIC BLOOD PRESSURE: 125 MMHG | DIASTOLIC BLOOD PRESSURE: 80 MMHG

## 2024-06-19 DIAGNOSIS — Z02.89 PAIN MANAGEMENT CONTRACT AGREEMENT: ICD-10-CM

## 2024-06-19 DIAGNOSIS — F11.20 OPIOID DEPENDENCE WITH CURRENT USE (HCC): ICD-10-CM

## 2024-06-19 DIAGNOSIS — G89.4 CHRONIC PAIN SYNDROME: ICD-10-CM

## 2024-06-19 DIAGNOSIS — R20.9 COLD SENSATION OF SKIN: ICD-10-CM

## 2024-06-19 DIAGNOSIS — R07.2 PRECORDIAL PAIN: Primary | ICD-10-CM

## 2024-06-19 DIAGNOSIS — K59.03 DRUG-INDUCED CONSTIPATION: ICD-10-CM

## 2024-06-19 DIAGNOSIS — M51.36 DDD (DEGENERATIVE DISC DISEASE), LUMBAR: ICD-10-CM

## 2024-06-19 LAB
T4 FREE SERPL-MCNC: 0.8 NG/DL (ref 0.7–1.5)
TSH SERPL DL<=0.05 MIU/L-ACNC: 1.19 UIU/ML (ref 0.36–3.74)

## 2024-06-19 PROCEDURE — G2211 COMPLEX E/M VISIT ADD ON: HCPCS | Performed by: STUDENT IN AN ORGANIZED HEALTH CARE EDUCATION/TRAINING PROGRAM

## 2024-06-19 PROCEDURE — 1123F ACP DISCUSS/DSCN MKR DOCD: CPT | Performed by: STUDENT IN AN ORGANIZED HEALTH CARE EDUCATION/TRAINING PROGRAM

## 2024-06-19 PROCEDURE — 84480 ASSAY TRIIODOTHYRONINE (T3): CPT

## 2024-06-19 PROCEDURE — 84439 ASSAY OF FREE THYROXINE: CPT

## 2024-06-19 PROCEDURE — 36415 COLL VENOUS BLD VENIPUNCTURE: CPT

## 2024-06-19 PROCEDURE — 99214 OFFICE O/P EST MOD 30 MIN: CPT | Performed by: STUDENT IN AN ORGANIZED HEALTH CARE EDUCATION/TRAINING PROGRAM

## 2024-06-19 PROCEDURE — 84443 ASSAY THYROID STIM HORMONE: CPT

## 2024-06-19 RX ORDER — TRAMADOL HYDROCHLORIDE 50 MG/1
50 TABLET ORAL EVERY 12 HOURS PRN
Qty: 60 TABLET | Refills: 0 | Status: SHIPPED | OUTPATIENT
Start: 2024-06-28 | End: 2024-07-28

## 2024-06-19 RX ORDER — TRIAMCINOLONE ACETONIDE 0.25 MG/G
CREAM TOPICAL
Qty: 80 G | Refills: 3 | Status: SHIPPED | OUTPATIENT
Start: 2024-06-19

## 2024-06-19 SDOH — ECONOMIC STABILITY: FOOD INSECURITY: WITHIN THE PAST 12 MONTHS, THE FOOD YOU BOUGHT JUST DIDN'T LAST AND YOU DIDN'T HAVE MONEY TO GET MORE.: NEVER TRUE

## 2024-06-19 SDOH — ECONOMIC STABILITY: FOOD INSECURITY: WITHIN THE PAST 12 MONTHS, YOU WORRIED THAT YOUR FOOD WOULD RUN OUT BEFORE YOU GOT MONEY TO BUY MORE.: NEVER TRUE

## 2024-06-19 SDOH — ECONOMIC STABILITY: INCOME INSECURITY: HOW HARD IS IT FOR YOU TO PAY FOR THE VERY BASICS LIKE FOOD, HOUSING, MEDICAL CARE, AND HEATING?: NOT VERY HARD

## 2024-06-19 ASSESSMENT — PATIENT HEALTH QUESTIONNAIRE - PHQ9
6. FEELING BAD ABOUT YOURSELF - OR THAT YOU ARE A FAILURE OR HAVE LET YOURSELF OR YOUR FAMILY DOWN: NOT AT ALL
8. MOVING OR SPEAKING SO SLOWLY THAT OTHER PEOPLE COULD HAVE NOTICED. OR THE OPPOSITE, BEING SO FIGETY OR RESTLESS THAT YOU HAVE BEEN MOVING AROUND A LOT MORE THAN USUAL: NOT AT ALL
9. THOUGHTS THAT YOU WOULD BE BETTER OFF DEAD, OR OF HURTING YOURSELF: NOT AT ALL
1. LITTLE INTEREST OR PLEASURE IN DOING THINGS: NOT AT ALL
4. FEELING TIRED OR HAVING LITTLE ENERGY: NOT AT ALL
SUM OF ALL RESPONSES TO PHQ QUESTIONS 1-9: 0
3. TROUBLE FALLING OR STAYING ASLEEP: NOT AT ALL
5. POOR APPETITE OR OVEREATING: NOT AT ALL
2. FEELING DOWN, DEPRESSED OR HOPELESS: NOT AT ALL
SUM OF ALL RESPONSES TO PHQ QUESTIONS 1-9: 0
10. IF YOU CHECKED OFF ANY PROBLEMS, HOW DIFFICULT HAVE THESE PROBLEMS MADE IT FOR YOU TO DO YOUR WORK, TAKE CARE OF THINGS AT HOME, OR GET ALONG WITH OTHER PEOPLE: NOT DIFFICULT AT ALL
7. TROUBLE CONCENTRATING ON THINGS, SUCH AS READING THE NEWSPAPER OR WATCHING TELEVISION: NOT AT ALL
SUM OF ALL RESPONSES TO PHQ QUESTIONS 1-9: 0
SUM OF ALL RESPONSES TO PHQ9 QUESTIONS 1 & 2: 0
SUM OF ALL RESPONSES TO PHQ QUESTIONS 1-9: 0

## 2024-06-19 NOTE — PROGRESS NOTES
Mayo Salinas is a 68 y.o. presents today for   Chief Complaint   Patient presents with    Follow-up    Medication Refill     Is someone accompanying this pt? no    Is the patient using any DME equipment during OV? no  There were no vitals filed for this visit.    Depression Screenin/19/2024     9:31 AM 2024     9:53 AM 8/15/2023    10:00 AM 2023    11:51 AM 10/4/2022    10:16 AM 2022     9:26 AM   PHQ-9 Questionaire   Little interest or pleasure in doing things 0 2 1 3 0 0   Feeling down, depressed, or hopeless 0 0 1 1 0 0   Trouble falling or staying asleep, or sleeping too much 0   0     Feeling tired or having little energy 0   3     Poor appetite or overeating 0   3     Feeling bad about yourself - or that you are a failure or have let yourself or your family down 0   0     Trouble concentrating on things, such as reading the newspaper or watching television 0   3     Moving or speaking so slowly that other people could have noticed. Or the opposite - being so fidgety or restless that you have been moving around a lot more than usual 0   0     Thoughts that you would be better off dead, or of hurting yourself in some way 0   0     PHQ-9 Total Score 0 2 2 13 0 0   If you checked off any problems, how difficult have these problems made it for you to do your work, take care of things at home, or get along with other people? 0   1          Abuse Screenin/1/2024    10:00 AM   AMB Abuse Screening   Do you ever feel afraid of your partner? N   Are you in a relationship with someone who physically or mentally threatens you? N   Is it safe for you to go home? Y        Learning Assessment Screening:   No question data found.     Fall Risk Screenin/1/2024     9:53 AM 8/15/2023    10:00 AM 2023    11:41 AM   Fall Risk   2 or more falls in past year? no no no   Fall with injury in past year? no no no           Health Maintenance: reviewed and discussed and ordered per

## 2024-06-19 NOTE — PROGRESS NOTES
Mayo Salinas (:  1955) is a 68 y.o. female here for evaluation of the following chief complaint(s):  Follow-up and Medication Refill        ASSESSMENT/PLAN:  1. Precordial pain  Assessment & Plan:  - unclear if pain is breast, MSK, or cardiopulmonary   - cards referral  Orders:  -     [facility] - Jorge Gagnon Sr, MD, Cardiology, [place] ()  2. Cold sensation of skin  Assessment & Plan:  - possible thyroid   - Hgb WNL  - thyroid labs  Orders:  -     T3; Future  -     T4, Free; Future  -     TSH; Future  3. Drug-induced constipation  Assessment & Plan:  - refractory to Linzess  - concern for repeat enemas, given electrolytes and cardiac hx  - GI referral   Orders:  -     External Referral To Gastroenterology  4. Chronic pain syndrome  -     traMADol (ULTRAM) 50 MG tablet; Take 1 tablet by mouth every 12 hours as needed for Pain for up to 30 days. Take lowest dose possible to manage pain Max Daily Amount: 100 mg, Disp-60 tablet, R-0Normal  5. DDD (degenerative disc disease), lumbar  Assessment & Plan:  - controlled on tramadol, but constipation is likely a SE   Orders:  -     traMADol (ULTRAM) 50 MG tablet; Take 1 tablet by mouth every 12 hours as needed for Pain for up to 30 days. Take lowest dose possible to manage pain Max Daily Amount: 100 mg, Disp-60 tablet, R-0Normal  6. Opioid dependence with current use (HCC)  -     traMADol (ULTRAM) 50 MG tablet; Take 1 tablet by mouth every 12 hours as needed for Pain for up to 30 days. Take lowest dose possible to manage pain Max Daily Amount: 100 mg, Disp-60 tablet, R-0Normal  7. Pain management contract agreement  -     traMADol (ULTRAM) 50 MG tablet; Take 1 tablet by mouth every 12 hours as needed for Pain for up to 30 days. Take lowest dose possible to manage pain Max Daily Amount: 100 mg, Disp-60 tablet, R-0Normal      Follow-up and Dispositions    Return in about 3 months (around 2024) for heart, constipation.

## 2024-06-20 ENCOUNTER — TELEPHONE (OUTPATIENT)
Age: 69
End: 2024-06-20

## 2024-06-20 PROBLEM — R20.9 COLD SENSATION OF SKIN: Status: ACTIVE | Noted: 2024-06-20

## 2024-06-20 PROBLEM — R07.2 PRECORDIAL PAIN: Status: ACTIVE | Noted: 2024-06-20

## 2024-06-20 PROBLEM — K59.03 DRUG-INDUCED CONSTIPATION: Status: ACTIVE | Noted: 2024-06-20

## 2024-06-20 NOTE — TELEPHONE ENCOUNTER
EXTERNAL REFERRAL TO GASTROENTEROLOGY none Bruce Cee MD 2 2     Diagnosis Information    Diagnosis   K59.03 (ICD-10-CM) - Drug-induced constipation     Service Level Authorizations    No service level authorizations were were found.  Referral Notes  Number of Notes: 2  .  Type Date User Summary Attachment   Specialty Comments 06/20/2024 11:11 AM Reece Villalba - -   Note:  Sent to provider to review.   .  Type Date User Summary Attachment   Provider Comments 06/19/2024 10:18 AM Bradly Ramsay MD Provider Comments -   Note:  The patient can be scheduled with any member of the group, including the provider with the first available appointments.   Referral Order    Order   External Referral To Gastroenterology (Order # [id number]) on 06/19/2024   View Encounter        Triage Information    Decision: (none)   Priority: Routine   Schedule by date: 7/19/2024     Scheduling Instructions

## 2024-06-20 NOTE — ASSESSMENT & PLAN NOTE
- refractory to Linzess  - concern for repeat enemas, given electrolytes and cardiac hx  - GI referral

## 2024-06-25 DIAGNOSIS — K59.03 DRUG-INDUCED CONSTIPATION: Primary | ICD-10-CM

## 2024-06-25 LAB — T3 SERPL-MCNC: 167 NG/DL (ref 71–180)

## 2024-06-25 NOTE — PROGRESS NOTES
Per GI, pt needs tertiary center for drug-induced constipation.  Will send referral to [facility]; if they cannot see her, she may have to go to [facility].

## 2024-07-12 DIAGNOSIS — F11.20 OPIOID DEPENDENCE WITH CURRENT USE (HCC): ICD-10-CM

## 2024-07-12 DIAGNOSIS — M51.36 DDD (DEGENERATIVE DISC DISEASE), LUMBAR: ICD-10-CM

## 2024-07-12 DIAGNOSIS — G89.4 CHRONIC PAIN SYNDROME: ICD-10-CM

## 2024-07-12 DIAGNOSIS — Z02.89 PAIN MANAGEMENT CONTRACT AGREEMENT: ICD-10-CM

## 2024-07-12 NOTE — TELEPHONE ENCOUNTER
Patient called in for a refill for:  traMADol (ULTRAM) 50 MG tablet    Pharmacy info:  [id number] - [place], [place] - [address] - P [phone] - F [phone]

## 2024-07-15 RX ORDER — TRAMADOL HYDROCHLORIDE 50 MG/1
50 TABLET ORAL EVERY 12 HOURS PRN
Qty: 60 TABLET | Refills: 0 | Status: SHIPPED | OUTPATIENT
Start: 2024-07-19 | End: 2024-08-18

## 2024-08-07 DIAGNOSIS — M51.369 DDD (DEGENERATIVE DISC DISEASE), LUMBAR: ICD-10-CM

## 2024-08-07 DIAGNOSIS — F11.20 OPIOID DEPENDENCE WITH CURRENT USE (HCC): ICD-10-CM

## 2024-08-07 DIAGNOSIS — M70.62 TROCHANTERIC BURSITIS OF LEFT HIP: ICD-10-CM

## 2024-08-07 DIAGNOSIS — Z02.89 PAIN MANAGEMENT CONTRACT AGREEMENT: ICD-10-CM

## 2024-08-07 DIAGNOSIS — G89.4 CHRONIC PAIN SYNDROME: ICD-10-CM

## 2024-08-07 DIAGNOSIS — J44.9 CHRONIC OBSTRUCTIVE PULMONARY DISEASE, UNSPECIFIED COPD TYPE (HCC): ICD-10-CM

## 2024-08-07 DIAGNOSIS — M70.61 TROCHANTERIC BURSITIS OF RIGHT HIP: ICD-10-CM

## 2024-08-07 RX ORDER — FLUTICASONE FUROATE, UMECLIDINIUM BROMIDE AND VILANTEROL TRIFENATATE 200; 62.5; 25 UG/1; UG/1; UG/1
1 POWDER RESPIRATORY (INHALATION) DAILY
Qty: 60 EACH | Refills: 0 | Status: CANCELLED | OUTPATIENT
Start: 2024-08-07

## 2024-08-07 RX ORDER — MELOXICAM 15 MG/1
15 TABLET ORAL DAILY
Qty: 30 TABLET | Refills: 2 | Status: CANCELLED | OUTPATIENT
Start: 2024-08-07

## 2024-08-07 RX ORDER — TRAMADOL HYDROCHLORIDE 50 MG/1
50 TABLET ORAL EVERY 12 HOURS PRN
Qty: 60 TABLET | Refills: 0 | Status: CANCELLED | OUTPATIENT
Start: 2024-08-07 | End: 2024-09-06

## 2024-08-07 NOTE — TELEPHONE ENCOUNTER
Medication(s) requesting:   Requested Prescriptions     Pending Prescriptions Disp Refills    traMADol (ULTRAM) 50 MG tablet 60 tablet 0     Sig: Take 1 tablet by mouth every 12 hours as needed for Pain for up to 30 days. Take lowest dose possible to manage pain Max Daily Amount: 100 mg    meloxicam (MOBIC) 15 MG tablet 30 tablet 2     Sig: Take 1 tablet by mouth daily    fluticasone-umeclidin-vilant (TRELEGY ELLIPTA) 200-62.5-25 MCG/ACT AEPB inhaler 60 each 0     Sig: Inhale 1 puff into the lungs daily       Last office visit:  06/19/2024  Next office visit DMA: 8/8/2024

## 2024-08-07 NOTE — TELEPHONE ENCOUNTER
Medication(s) requesting:   Requested Prescriptions     Pending Prescriptions Disp Refills    traMADol (ULTRAM) 50 MG tablet 60 tablet 0     Sig: Take 1 tablet by mouth every 12 hours as needed for Pain for up to 30 days. Take lowest dose possible to manage pain Max Daily Amount: 100 mg       Last office visit:  06/19/2024  Next office visit DMA: 8/8/2024

## 2024-08-07 NOTE — TELEPHONE ENCOUNTER
Pt called to request med refills for:    Please send to [facility]:  Tramadol 50 mg tablet    Please send to [facility]:  1.  Meloxicam 15 mg tablet  2.  Trelegy 200-6.25-25 mcg/ACT Inhaler    LOV:  6/19/2024    Future Appointment:   Date Time Provider Department   8/8/2024 11:45 AM Bradly Ramsay MD DMA     *Pt requested appointment so that she can get her medications right.    Please assist. Thank you.

## 2024-08-08 ENCOUNTER — OFFICE VISIT (OUTPATIENT)
Facility: CLINIC | Age: 69
End: 2024-08-08
Payer: MEDICARE

## 2024-08-08 VITALS
HEART RATE: 74 BPM | HEIGHT: 63 IN | BODY MASS INDEX: 23.81 KG/M2 | WEIGHT: 134.4 LBS | DIASTOLIC BLOOD PRESSURE: 95 MMHG | TEMPERATURE: 97.8 F | OXYGEN SATURATION: 93 % | SYSTOLIC BLOOD PRESSURE: 164 MMHG | RESPIRATION RATE: 16 BRPM

## 2024-08-08 DIAGNOSIS — M70.61 TROCHANTERIC BURSITIS OF RIGHT HIP: ICD-10-CM

## 2024-08-08 DIAGNOSIS — Z02.89 PAIN MANAGEMENT CONTRACT AGREEMENT: ICD-10-CM

## 2024-08-08 DIAGNOSIS — R03.0 ELEVATED BLOOD PRESSURE READING: ICD-10-CM

## 2024-08-08 DIAGNOSIS — F11.20 OPIOID DEPENDENCE WITH CURRENT USE (HCC): ICD-10-CM

## 2024-08-08 DIAGNOSIS — G89.4 CHRONIC PAIN SYNDROME: ICD-10-CM

## 2024-08-08 DIAGNOSIS — R13.10 DYSPHAGIA, UNSPECIFIED TYPE: ICD-10-CM

## 2024-08-08 DIAGNOSIS — J44.9 CHRONIC OBSTRUCTIVE PULMONARY DISEASE, UNSPECIFIED COPD TYPE (HCC): ICD-10-CM

## 2024-08-08 DIAGNOSIS — M51.36 DDD (DEGENERATIVE DISC DISEASE), LUMBAR: ICD-10-CM

## 2024-08-08 DIAGNOSIS — Z78.0 MENOPAUSE: ICD-10-CM

## 2024-08-08 DIAGNOSIS — M70.62 TROCHANTERIC BURSITIS OF LEFT HIP: ICD-10-CM

## 2024-08-08 DIAGNOSIS — R51.9 ACUTE NONINTRACTABLE HEADACHE, UNSPECIFIED HEADACHE TYPE: Primary | ICD-10-CM

## 2024-08-08 PROCEDURE — G8399 PT W/DXA RESULTS DOCUMENT: HCPCS | Performed by: STUDENT IN AN ORGANIZED HEALTH CARE EDUCATION/TRAINING PROGRAM

## 2024-08-08 PROCEDURE — 1123F ACP DISCUSS/DSCN MKR DOCD: CPT | Performed by: STUDENT IN AN ORGANIZED HEALTH CARE EDUCATION/TRAINING PROGRAM

## 2024-08-08 PROCEDURE — 1090F PRES/ABSN URINE INCON ASSESS: CPT | Performed by: STUDENT IN AN ORGANIZED HEALTH CARE EDUCATION/TRAINING PROGRAM

## 2024-08-08 PROCEDURE — G2211 COMPLEX E/M VISIT ADD ON: HCPCS | Performed by: STUDENT IN AN ORGANIZED HEALTH CARE EDUCATION/TRAINING PROGRAM

## 2024-08-08 PROCEDURE — 4004F PT TOBACCO SCREEN RCVD TLK: CPT | Performed by: STUDENT IN AN ORGANIZED HEALTH CARE EDUCATION/TRAINING PROGRAM

## 2024-08-08 PROCEDURE — 3017F COLORECTAL CA SCREEN DOC REV: CPT | Performed by: STUDENT IN AN ORGANIZED HEALTH CARE EDUCATION/TRAINING PROGRAM

## 2024-08-08 PROCEDURE — G8420 CALC BMI NORM PARAMETERS: HCPCS | Performed by: STUDENT IN AN ORGANIZED HEALTH CARE EDUCATION/TRAINING PROGRAM

## 2024-08-08 PROCEDURE — G8427 DOCREV CUR MEDS BY ELIG CLIN: HCPCS | Performed by: STUDENT IN AN ORGANIZED HEALTH CARE EDUCATION/TRAINING PROGRAM

## 2024-08-08 PROCEDURE — 3023F SPIROM DOC REV: CPT | Performed by: STUDENT IN AN ORGANIZED HEALTH CARE EDUCATION/TRAINING PROGRAM

## 2024-08-08 PROCEDURE — 99215 OFFICE O/P EST HI 40 MIN: CPT | Performed by: STUDENT IN AN ORGANIZED HEALTH CARE EDUCATION/TRAINING PROGRAM

## 2024-08-08 RX ORDER — TRAMADOL HYDROCHLORIDE 50 MG/1
50 TABLET ORAL EVERY 12 HOURS PRN
Qty: 60 TABLET | Refills: 0 | Status: SHIPPED | OUTPATIENT
Start: 2024-08-19 | End: 2024-09-18

## 2024-08-08 RX ORDER — FLUTICASONE FUROATE, UMECLIDINIUM BROMIDE AND VILANTEROL TRIFENATATE 200; 62.5; 25 UG/1; UG/1; UG/1
1 POWDER RESPIRATORY (INHALATION) DAILY
Qty: 60 EACH | Refills: 0 | Status: SHIPPED | OUTPATIENT
Start: 2024-08-08 | End: 2024-08-08

## 2024-08-08 RX ORDER — MELOXICAM 15 MG/1
15 TABLET ORAL DAILY
Qty: 90 TABLET | Refills: 3 | Status: SHIPPED | OUTPATIENT
Start: 2024-08-08 | End: 2024-08-08

## 2024-08-08 RX ORDER — TRAZODONE HYDROCHLORIDE 50 MG/1
50 TABLET ORAL NIGHTLY
COMMUNITY

## 2024-08-08 RX ORDER — ALBUTEROL SULFATE 90 UG/1
2 AEROSOL, METERED RESPIRATORY (INHALATION) 4 TIMES DAILY PRN
Qty: 54 G | Refills: 1 | Status: SHIPPED | OUTPATIENT
Start: 2024-08-08

## 2024-08-08 RX ORDER — TRAMADOL HYDROCHLORIDE 50 MG/1
50 TABLET ORAL EVERY 12 HOURS PRN
Qty: 60 TABLET | Refills: 0 | Status: SHIPPED | OUTPATIENT
Start: 2024-08-19 | End: 2024-08-08

## 2024-08-08 RX ORDER — ALBUTEROL SULFATE 90 UG/1
2 AEROSOL, METERED RESPIRATORY (INHALATION) 4 TIMES DAILY PRN
Qty: 54 G | Refills: 1 | Status: SHIPPED | OUTPATIENT
Start: 2024-08-08 | End: 2024-08-08

## 2024-08-08 RX ORDER — FLUTICASONE FUROATE, UMECLIDINIUM BROMIDE AND VILANTEROL TRIFENATATE 200; 62.5; 25 UG/1; UG/1; UG/1
1 POWDER RESPIRATORY (INHALATION) DAILY
Qty: 60 EACH | Refills: 0 | Status: SHIPPED | OUTPATIENT
Start: 2024-08-08

## 2024-08-08 RX ORDER — MELOXICAM 15 MG/1
15 TABLET ORAL DAILY
Qty: 90 TABLET | Refills: 3 | Status: SHIPPED | OUTPATIENT
Start: 2024-08-08

## 2024-08-08 NOTE — ASSESSMENT & PLAN NOTE
- concern for acute process, given sudden sxs associated with dysphagia  - CT head noncon  - neuro exam WNL  - discussed that ED visit is appropriate, but pt does not want to go to the ED; I told her a regular CT order may not be done soon enough and any necessary intervention would likely be delayed; pt conveyed understanding, still does not want to go to ED

## 2024-08-08 NOTE — PROGRESS NOTES
Mayo Salinas (:  1955) is a 68 y.o. female here for evaluation of the following chief complaint(s):  Asthma        ASSESSMENT/PLAN:  1. Acute nonintractable headache, unspecified headache type  Assessment & Plan:  - concern for acute process, given sudden sxs associated with dysphagia  - CT head noncon  - neuro exam WNL  - discussed that ED visit is appropriate, but pt does not want to go to the ED; I told her a regular CT order may not be done soon enough and any necessary intervention would likely be delayed; pt conveyed understanding, still does not want to go to ED   Orders:  -     CT HEAD WO CONTRAST; Future  2. Dysphagia, unspecified type  -     CT HEAD WO CONTRAST; Future  3. Chronic obstructive pulmonary disease, unspecified COPD type ([facility])  -     albuterol sulfate HFA (VENTOLIN HFA) 108 (90 Base) MCG/ACT inhaler; Inhale 2 puffs into the lungs 4 times daily as needed for Wheezing, Disp-54 g, R-1Normal  -     fluticasone-umeclidin-vilant (TRELEGY ELLIPTA) 200-62.5-25 MCG/ACT AEPB inhaler; Inhale 1 puff into the lungs daily, Disp-60 each, R-0Normal  4. Chronic pain syndrome  -     traMADol (ULTRAM) 50 MG tablet; Take 1 tablet by mouth every 12 hours as needed for Pain for up to 30 days. Take lowest dose possible to manage pain Max Daily Amount: 100 mg, Disp-60 tablet, R-0Normal  5. DDD (degenerative disc disease), lumbar  -     traMADol (ULTRAM) 50 MG tablet; Take 1 tablet by mouth every 12 hours as needed for Pain for up to 30 days. Take lowest dose possible to manage pain Max Daily Amount: 100 mg, Disp-60 tablet, R-0Normal  6. Opioid dependence with current use (HCC)  -     traMADol (ULTRAM) 50 MG tablet; Take 1 tablet by mouth every 12 hours as needed for Pain for up to 30 days. Take lowest dose possible to manage pain Max Daily Amount: 100 mg, Disp-60 tablet, R-0Normal  7. Pain management contract agreement  -     traMADol (ULTRAM) 50 MG tablet; Take 1 tablet by mouth every 12 hours as

## 2024-08-08 NOTE — PATIENT INSTRUCTIONS
The combination of new head pain and trouble swallowing is concerning for your brain.     Please call the following phone number to schedule the colonoscopy:  [facility]  Phone# [phone]

## 2024-08-09 NOTE — TELEPHONE ENCOUNTER
General--no acute distress Abdomen--soft, non tender, non distended, bowel sounds present  Patient requesting refill

## 2024-08-23 ENCOUNTER — HOSPITAL ENCOUNTER (OUTPATIENT)
Facility: HOSPITAL | Age: 69
End: 2024-08-23
Payer: MEDICARE

## 2024-08-23 DIAGNOSIS — R13.10 DYSPHAGIA, UNSPECIFIED TYPE: ICD-10-CM

## 2024-08-23 DIAGNOSIS — R51.9 ACUTE NONINTRACTABLE HEADACHE, UNSPECIFIED HEADACHE TYPE: ICD-10-CM

## 2024-08-23 PROCEDURE — 70450 CT HEAD/BRAIN W/O DYE: CPT

## 2024-09-07 DIAGNOSIS — R13.10 DYSPHAGIA, UNSPECIFIED TYPE: ICD-10-CM

## 2024-09-07 DIAGNOSIS — K21.9 GASTROESOPHAGEAL REFLUX DISEASE, UNSPECIFIED WHETHER ESOPHAGITIS PRESENT: ICD-10-CM

## 2024-09-09 ENCOUNTER — OFFICE VISIT (OUTPATIENT)
Facility: CLINIC | Age: 69
End: 2024-09-09

## 2024-09-09 VITALS
HEIGHT: 63 IN | HEART RATE: 67 BPM | BODY MASS INDEX: 23.99 KG/M2 | DIASTOLIC BLOOD PRESSURE: 80 MMHG | OXYGEN SATURATION: 90 % | WEIGHT: 135.4 LBS | TEMPERATURE: 97.4 F | RESPIRATION RATE: 11 BRPM | SYSTOLIC BLOOD PRESSURE: 135 MMHG

## 2024-09-09 DIAGNOSIS — J44.9 CHRONIC OBSTRUCTIVE PULMONARY DISEASE, UNSPECIFIED COPD TYPE (HCC): ICD-10-CM

## 2024-09-09 DIAGNOSIS — F11.20 OPIOID DEPENDENCE WITH CURRENT USE (HCC): ICD-10-CM

## 2024-09-09 DIAGNOSIS — Z12.12 ENCOUNTER FOR COLORECTAL CANCER SCREENING: ICD-10-CM

## 2024-09-09 DIAGNOSIS — G89.4 CHRONIC PAIN SYNDROME: ICD-10-CM

## 2024-09-09 DIAGNOSIS — M81.6 LOCALIZED OSTEOPOROSIS (LEQUESNE): ICD-10-CM

## 2024-09-09 DIAGNOSIS — R03.0 ELEVATED BLOOD PRESSURE READING: Primary | ICD-10-CM

## 2024-09-09 DIAGNOSIS — M25.552 BILATERAL HIP PAIN: ICD-10-CM

## 2024-09-09 DIAGNOSIS — M51.36 DDD (DEGENERATIVE DISC DISEASE), LUMBAR: ICD-10-CM

## 2024-09-09 DIAGNOSIS — M16.0 PRIMARY OSTEOARTHRITIS OF BOTH HIPS: ICD-10-CM

## 2024-09-09 DIAGNOSIS — M25.551 BILATERAL HIP PAIN: ICD-10-CM

## 2024-09-09 DIAGNOSIS — Z12.11 ENCOUNTER FOR COLORECTAL CANCER SCREENING: ICD-10-CM

## 2024-09-09 DIAGNOSIS — Z02.89 PAIN MANAGEMENT CONTRACT AGREEMENT: ICD-10-CM

## 2024-09-09 RX ORDER — FAMOTIDINE 20 MG/1
20 TABLET, FILM COATED ORAL 2 TIMES DAILY
Qty: 180 TABLET | Refills: 0 | Status: SHIPPED | OUTPATIENT
Start: 2024-09-09

## 2024-09-09 RX ORDER — BACLOFEN 10 MG/1
10 TABLET ORAL 2 TIMES DAILY
Qty: 180 TABLET | Refills: 3 | Status: SHIPPED | OUTPATIENT
Start: 2024-09-09

## 2024-09-09 RX ORDER — FLUTICASONE FUROATE, UMECLIDINIUM BROMIDE AND VILANTEROL TRIFENATATE 200; 62.5; 25 UG/1; UG/1; UG/1
1 POWDER RESPIRATORY (INHALATION) DAILY
Qty: 60 EACH | Refills: 0 | Status: SHIPPED | OUTPATIENT
Start: 2024-09-09

## 2024-09-09 RX ORDER — ALBUTEROL SULFATE 90 UG/1
2 AEROSOL, METERED RESPIRATORY (INHALATION) 4 TIMES DAILY PRN
Qty: 54 G | Refills: 1 | Status: SHIPPED | OUTPATIENT
Start: 2024-09-09

## 2024-09-09 RX ORDER — TRAMADOL HYDROCHLORIDE 50 MG/1
50 TABLET ORAL EVERY 12 HOURS PRN
Qty: 60 TABLET | Refills: 0 | Status: SHIPPED | OUTPATIENT
Start: 2024-09-09 | End: 2024-10-09

## 2024-09-18 ENCOUNTER — OFFICE VISIT (OUTPATIENT)
Facility: CLINIC | Age: 69
End: 2024-09-18

## 2024-09-18 VITALS
DIASTOLIC BLOOD PRESSURE: 76 MMHG | SYSTOLIC BLOOD PRESSURE: 133 MMHG | HEIGHT: 63 IN | OXYGEN SATURATION: 90 % | HEART RATE: 66 BPM | WEIGHT: 133.8 LBS | TEMPERATURE: 97.4 F | RESPIRATION RATE: 10 BRPM | BODY MASS INDEX: 23.71 KG/M2

## 2024-09-18 DIAGNOSIS — Z23 ENCOUNTER FOR IMMUNIZATION: ICD-10-CM

## 2024-09-18 DIAGNOSIS — Z01.818 PREOP EXAMINATION: Primary | ICD-10-CM

## 2024-09-18 DIAGNOSIS — M16.0 PRIMARY OSTEOARTHRITIS OF BOTH HIPS: ICD-10-CM

## 2024-09-18 DIAGNOSIS — H25.9 AGE-RELATED CATARACT OF BOTH EYES, UNSPECIFIED AGE-RELATED CATARACT TYPE: ICD-10-CM

## 2024-09-18 RX ORDER — DULOXETIN HYDROCHLORIDE 20 MG/1
20 CAPSULE, DELAYED RELEASE ORAL DAILY
Qty: 90 CAPSULE | Refills: 0 | Status: SHIPPED | OUTPATIENT
Start: 2024-09-18

## 2024-09-18 RX ORDER — TRAZODONE HYDROCHLORIDE 50 MG/1
25 TABLET, FILM COATED ORAL NIGHTLY
Status: SHIPPED | COMMUNITY
Start: 2024-09-18

## 2024-09-24 ENCOUNTER — OFFICE VISIT (OUTPATIENT)
Age: 69
End: 2024-09-24
Payer: MEDICARE

## 2024-09-24 VITALS — BODY MASS INDEX: 23.74 KG/M2 | HEIGHT: 63 IN | WEIGHT: 134 LBS

## 2024-09-24 DIAGNOSIS — M25.551 BILATERAL HIP PAIN: ICD-10-CM

## 2024-09-24 DIAGNOSIS — M51.36 DDD (DEGENERATIVE DISC DISEASE), LUMBAR: ICD-10-CM

## 2024-09-24 DIAGNOSIS — M70.61 TROCHANTERIC BURSITIS OF RIGHT HIP: ICD-10-CM

## 2024-09-24 DIAGNOSIS — M25.552 BILATERAL HIP PAIN: ICD-10-CM

## 2024-09-24 DIAGNOSIS — M70.62 TROCHANTERIC BURSITIS OF LEFT HIP: Primary | ICD-10-CM

## 2024-09-24 PROCEDURE — 3017F COLORECTAL CA SCREEN DOC REV: CPT

## 2024-09-24 PROCEDURE — 1123F ACP DISCUSS/DSCN MKR DOCD: CPT

## 2024-09-24 PROCEDURE — G8399 PT W/DXA RESULTS DOCUMENT: HCPCS

## 2024-09-24 PROCEDURE — 1090F PRES/ABSN URINE INCON ASSESS: CPT

## 2024-09-24 PROCEDURE — 20610 DRAIN/INJ JOINT/BURSA W/O US: CPT

## 2024-09-24 PROCEDURE — 99214 OFFICE O/P EST MOD 30 MIN: CPT

## 2024-09-24 PROCEDURE — 73523 X-RAY EXAM HIPS BI 5/> VIEWS: CPT

## 2024-09-24 PROCEDURE — G8420 CALC BMI NORM PARAMETERS: HCPCS

## 2024-09-24 PROCEDURE — 4004F PT TOBACCO SCREEN RCVD TLK: CPT

## 2024-09-24 PROCEDURE — G8428 CUR MEDS NOT DOCUMENT: HCPCS

## 2024-09-24 RX ORDER — TRIAMCINOLONE ACETONIDE 40 MG/ML
80 INJECTION, SUSPENSION INTRA-ARTICULAR; INTRAMUSCULAR ONCE
Status: CANCELLED | OUTPATIENT
Start: 2024-09-24 | End: 2024-09-24

## 2024-09-24 RX ORDER — LIDOCAINE HYDROCHLORIDE 10 MG/ML
2 INJECTION, SOLUTION INFILTRATION; PERINEURAL ONCE
Status: CANCELLED | OUTPATIENT
Start: 2024-09-24 | End: 2024-09-24

## 2024-09-24 RX ORDER — TRIAMCINOLONE ACETONIDE 40 MG/ML
80 INJECTION, SUSPENSION INTRA-ARTICULAR; INTRAMUSCULAR ONCE
Status: COMPLETED | OUTPATIENT
Start: 2024-09-24 | End: 2024-09-24

## 2024-09-24 RX ORDER — LIDOCAINE HYDROCHLORIDE 10 MG/ML
2 INJECTION, SOLUTION INFILTRATION; PERINEURAL ONCE
Status: COMPLETED | OUTPATIENT
Start: 2024-09-24 | End: 2024-09-24

## 2024-09-24 RX ADMIN — TRIAMCINOLONE ACETONIDE 80 MG: 40 INJECTION, SUSPENSION INTRA-ARTICULAR; INTRAMUSCULAR at 13:30

## 2024-09-24 RX ADMIN — LIDOCAINE HYDROCHLORIDE 2 ML: 10 INJECTION, SOLUTION INFILTRATION; PERINEURAL at 13:29

## 2024-10-04 LAB — NONINV COLON CA DNA+OCC BLD SCRN STL QL: NORMAL

## 2024-10-09 DIAGNOSIS — F11.20 OPIOID DEPENDENCE WITH CURRENT USE (HCC): ICD-10-CM

## 2024-10-09 DIAGNOSIS — G89.4 CHRONIC PAIN SYNDROME: ICD-10-CM

## 2024-10-09 DIAGNOSIS — M51.369 DDD (DEGENERATIVE DISC DISEASE), LUMBAR: ICD-10-CM

## 2024-10-09 DIAGNOSIS — Z02.89 PAIN MANAGEMENT CONTRACT AGREEMENT: ICD-10-CM

## 2024-10-10 ENCOUNTER — OFFICE VISIT (OUTPATIENT)
Age: 69
End: 2024-10-10

## 2024-10-10 VITALS — WEIGHT: 137 LBS | BODY MASS INDEX: 24.27 KG/M2 | HEIGHT: 63 IN

## 2024-10-10 DIAGNOSIS — M47.816 LUMBAR SPONDYLOSIS: Primary | ICD-10-CM

## 2024-10-10 DIAGNOSIS — M51.362 DEGENERATION OF INTERVERTEBRAL DISC OF LUMBAR REGION WITH DISCOGENIC BACK PAIN AND LOWER EXTREMITY PAIN: ICD-10-CM

## 2024-10-10 RX ORDER — LIDOCAINE 50 MG/G
1 PATCH TOPICAL DAILY
Qty: 30 PATCH | Refills: 3 | Status: SHIPPED | OUTPATIENT
Start: 2024-10-10 | End: 2024-11-09

## 2024-10-10 RX ORDER — TRAMADOL HYDROCHLORIDE 50 MG/1
50 TABLET ORAL EVERY 12 HOURS PRN
Qty: 10 TABLET | Refills: 0 | Status: SHIPPED | OUTPATIENT
Start: 2024-10-10 | End: 2024-10-15
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
## 2024-10-10 NOTE — PROGRESS NOTES
Mayo Patricia Salinas presents today for   Chief Complaint   Patient presents with    Back Pain     Lumbar spine       Is someone accompanying this pt? no    Is the patient using any DME equipment during OV? no    Depression Screening:       No data to display                Learning Assessment:  Failed to redirect to the Timeline version of the [employer] SmartLink.    Abuse Screening:       No data to display                Fall Risk  Failed to redirect to the Timeline version of the [employer] Smart[employer].    OPIOID RISK TOOL  Failed to redirect to the Timeline version of the [employer] SmartLink.    Coordination of Care:  1. Have you been to the ER, urgent care clinic since your last visit? no  Hospitalized since your last visit? no    2. Have you seen or consulted any other health care providers outside of the [facility] since your last visit? no Include any pap smears or colon screening. no        no

## 2024-10-10 NOTE — TELEPHONE ENCOUNTER
Medication(s) requesting:   Requested Prescriptions     Pending Prescriptions Disp Refills    traMADol (ULTRAM) 50 MG tablet [Pharmacy Med Name: TRAMADOL 50MG TABLETS] 60 tablet      Sig: TAKE 1 TABLET BY MOUTH EVERY 12 HOURS AS NEEDED FOR PAIN. TAKE LOWEST DOSE POSSIBLE TO MANAGE PAIN. MAX DAILY AMOUNT: 100 MG        Last office visit:  9/18/24  Next office visit DMA: 10/15/2024

## 2024-10-15 DIAGNOSIS — F11.20 OPIOID DEPENDENCE WITH CURRENT USE (HCC): ICD-10-CM

## 2024-10-15 DIAGNOSIS — G89.4 CHRONIC PAIN SYNDROME: ICD-10-CM

## 2024-10-15 DIAGNOSIS — Z02.89 PAIN MANAGEMENT CONTRACT AGREEMENT: ICD-10-CM

## 2024-10-15 DIAGNOSIS — M51.369 DDD (DEGENERATIVE DISC DISEASE), LUMBAR: ICD-10-CM

## 2024-10-15 NOTE — TELEPHONE ENCOUNTER
Medication(s) requesting:   Requested Prescriptions     Pending Prescriptions Disp Refills    traMADol (ULTRAM) 50 MG tablet [Pharmacy Med Name: TRAMADOL 50MG TABLETS] 10 tablet      Sig: TAKE 1 TABLET BY MOUTH EVERY 12 HOURS FOR UP TO 5 DAYS AS NEEDED FOR PAIN. TAKE LOWEST DOSE POSSIBLE TO MANAGE PAIN. MAX DAILY AMOUNT: 100 MG        Last office visit:  9/18/24  Next office visit DMA: 10/22/2024

## 2024-10-16 ENCOUNTER — CLINICAL DOCUMENTATION (OUTPATIENT)
Age: 69
End: 2024-10-16

## 2024-10-16 NOTE — PROGRESS NOTES
The pt's order for e-stim, last office note, and demographics were faxed over to [employer] for review. A fax confirmation was received. They will contact the pt regarding the order. Order form sent to scan.

## 2024-10-17 RX ORDER — TRAMADOL HYDROCHLORIDE 50 MG/1
50 TABLET ORAL EVERY 12 HOURS PRN
Qty: 60 TABLET | Refills: 0 | Status: SHIPPED | OUTPATIENT
Start: 2024-10-17 | End: 2024-11-16

## 2024-10-22 ENCOUNTER — OFFICE VISIT (OUTPATIENT)
Facility: CLINIC | Age: 69
End: 2024-10-22
Payer: MEDICARE

## 2024-10-22 VITALS
HEIGHT: 63 IN | OXYGEN SATURATION: 96 % | DIASTOLIC BLOOD PRESSURE: 75 MMHG | WEIGHT: 137.6 LBS | HEART RATE: 74 BPM | SYSTOLIC BLOOD PRESSURE: 135 MMHG | TEMPERATURE: 97.7 F | RESPIRATION RATE: 14 BRPM | BODY MASS INDEX: 24.38 KG/M2

## 2024-10-22 DIAGNOSIS — Z12.12 ENCOUNTER FOR COLORECTAL CANCER SCREENING: Primary | ICD-10-CM

## 2024-10-22 DIAGNOSIS — Z12.11 ENCOUNTER FOR COLORECTAL CANCER SCREENING: Primary | ICD-10-CM

## 2024-10-22 DIAGNOSIS — K13.0 ANGULAR CHEILITIS: ICD-10-CM

## 2024-10-22 DIAGNOSIS — R03.0 ELEVATED BLOOD PRESSURE READING: ICD-10-CM

## 2024-10-22 DIAGNOSIS — J43.9 PULMONARY EMPHYSEMA, UNSPECIFIED EMPHYSEMA TYPE (HCC): ICD-10-CM

## 2024-10-22 PROCEDURE — G2211 COMPLEX E/M VISIT ADD ON: HCPCS | Performed by: STUDENT IN AN ORGANIZED HEALTH CARE EDUCATION/TRAINING PROGRAM

## 2024-10-22 PROCEDURE — 1123F ACP DISCUSS/DSCN MKR DOCD: CPT | Performed by: STUDENT IN AN ORGANIZED HEALTH CARE EDUCATION/TRAINING PROGRAM

## 2024-10-22 PROCEDURE — 99214 OFFICE O/P EST MOD 30 MIN: CPT | Performed by: STUDENT IN AN ORGANIZED HEALTH CARE EDUCATION/TRAINING PROGRAM

## 2024-10-22 NOTE — PROGRESS NOTES
Mayo Salinas is a 69 y.o. year old female who presents today for   Chief Complaint   Patient presents with    Follow-up        \"Have you been to the ER, urgent care clinic since your last visit?  Hospitalized since your last visit?\"   no Where:     When:    Reason:      “Have you seen or consulted any other health care providers outside our system since your last visit?”   NO       “Have you had a colorectal cancer screening such as a colonoscopy/FIT/Cologuard?    NO    Date of last Colonoscopy: 6/7/2013  No cologuard on file  No FIT/FOBT on file   No flexible sigmoidoscopy on file           Emy Sheehan [facility]  [facility]  Ph: [phone]  Fax: [phone]

## 2024-10-22 NOTE — PROGRESS NOTES
Mayo Salinas (:  1955) is a 69 y.o. female here for evaluation of the following chief complaint(s):  Follow-up        ASSESSMENT/PLAN:  1. Encounter for colorectal cancer screening  -     External Referral To Gastroenterology  2. Elevated blood pressure reading  Assessment & Plan:  - controlled, no medication changes   3. Angular cheilitis  Assessment & Plan:  - start Fe supplements   4. Pulmonary emphysema, unspecified emphysema type (HCC)  -     Budeson-Glycopyrrol-Formoterol 160-9-4.8 MCG/ACT AERO; Inhale 1 puff into the lungs daily, Disp-5.9 g, R-5Normal      Follow-up and Dispositions    Return in about 4 months (around 2025) for Annual.         SUBJECTIVE/OBJECTIVE:  HPI  Chronic back pain/OA  - baclofen, mobic, tramadol, voltaren gel  - previously dx'd with \"degenerative bone disease\"; a cream was prescribed  Update (24)  - bilateral hip pain returned; has hx of OA and bursitis  - unclear what specific dx is \"degenerative bone disease\"; pt not sure if she has received a bone scan  Update (10/22/24)  - has received TENS unit, yet to use it    Cataracts, left eye  - Phaco w/ IOL  - MAC and topical anesthesia  Update (10/22/24)  - HA has resolved  - plans for R eye later this month    Angular chelitis  - L side of  mouth  - denies dry mouth    ROS as stated above.    /75   Pulse 74   Temp 97.7 °F (36.5 °C) (Temporal)   Resp 14   Ht 1.6 m (5' 3\")   Wt 62.4 kg (137 lb 9.6 oz)   SpO2 96%   BMI 24.37 kg/m²     Physical Exam          An electronic signature was used to authenticate this note.    --Bradly Ramsay MD

## 2024-10-23 ENCOUNTER — OFFICE VISIT (OUTPATIENT)
Age: 69
End: 2024-10-23
Payer: MEDICARE

## 2024-10-23 VITALS
DIASTOLIC BLOOD PRESSURE: 87 MMHG | SYSTOLIC BLOOD PRESSURE: 147 MMHG | HEIGHT: 63 IN | BODY MASS INDEX: 24.27 KG/M2 | OXYGEN SATURATION: 96 % | HEART RATE: 74 BPM | WEIGHT: 137 LBS

## 2024-10-23 DIAGNOSIS — R07.9 CHEST PAIN, UNSPECIFIED TYPE: Primary | ICD-10-CM

## 2024-10-23 DIAGNOSIS — R03.0 ELEVATED BLOOD PRESSURE READING: ICD-10-CM

## 2024-10-23 PROBLEM — K57.30 DIVERTICULAR DISEASE OF COLON: Status: ACTIVE | Noted: 2024-10-23

## 2024-10-23 PROBLEM — K64.8 INTERNAL HEMORRHOIDS: Status: ACTIVE | Noted: 2024-10-23

## 2024-10-23 PROBLEM — Z12.11 ENCOUNTER FOR SCREENING FOR MALIGNANT NEOPLASM OF COLON: Status: ACTIVE | Noted: 2024-10-23

## 2024-10-23 PROCEDURE — 93000 ELECTROCARDIOGRAM COMPLETE: CPT | Performed by: INTERNAL MEDICINE

## 2024-10-23 PROCEDURE — 1126F AMNT PAIN NOTED NONE PRSNT: CPT | Performed by: INTERNAL MEDICINE

## 2024-10-23 PROCEDURE — 1123F ACP DISCUSS/DSCN MKR DOCD: CPT | Performed by: INTERNAL MEDICINE

## 2024-10-23 PROCEDURE — 99204 OFFICE O/P NEW MOD 45 MIN: CPT | Performed by: INTERNAL MEDICINE

## 2024-10-23 ASSESSMENT — PATIENT HEALTH QUESTIONNAIRE - PHQ9
SUM OF ALL RESPONSES TO PHQ QUESTIONS 1-9: 0
6. FEELING BAD ABOUT YOURSELF - OR THAT YOU ARE A FAILURE OR HAVE LET YOURSELF OR YOUR FAMILY DOWN: NOT AT ALL
SUM OF ALL RESPONSES TO PHQ QUESTIONS 1-9: 0
2. FEELING DOWN, DEPRESSED OR HOPELESS: NOT AT ALL
1. LITTLE INTEREST OR PLEASURE IN DOING THINGS: NOT AT ALL
7. TROUBLE CONCENTRATING ON THINGS, SUCH AS READING THE NEWSPAPER OR WATCHING TELEVISION: NOT AT ALL
SUM OF ALL RESPONSES TO PHQ9 QUESTIONS 1 & 2: 0
8. MOVING OR SPEAKING SO SLOWLY THAT OTHER PEOPLE COULD HAVE NOTICED. OR THE OPPOSITE, BEING SO FIGETY OR RESTLESS THAT YOU HAVE BEEN MOVING AROUND A LOT MORE THAN USUAL: NOT AT ALL
10. IF YOU CHECKED OFF ANY PROBLEMS, HOW DIFFICULT HAVE THESE PROBLEMS MADE IT FOR YOU TO DO YOUR WORK, TAKE CARE OF THINGS AT HOME, OR GET ALONG WITH OTHER PEOPLE: NOT DIFFICULT AT ALL
SUM OF ALL RESPONSES TO PHQ QUESTIONS 1-9: 0
3. TROUBLE FALLING OR STAYING ASLEEP: NOT AT ALL
9. THOUGHTS THAT YOU WOULD BE BETTER OFF DEAD, OR OF HURTING YOURSELF: NOT AT ALL
SUM OF ALL RESPONSES TO PHQ QUESTIONS 1-9: 0
5. POOR APPETITE OR OVEREATING: NOT AT ALL
4. FEELING TIRED OR HAVING LITTLE ENERGY: NOT AT ALL

## 2024-10-23 NOTE — PROGRESS NOTES
Mayo Salinas presents today for   Chief Complaint   Patient presents with    New Patient     Chest pains       Mayo Salinas preferred language for health care discussion is english/other.    Is someone accompanying this pt? no    Is the patient using any DME equipment during OV? no    Depression Screening:  Depression: Not at risk (10/23/2024)    PHQ-2     PHQ-2 Score: 0        Learning Assessment:  Who is the primary learner? Patient    What is the preferred language for health care of the primary learner? ENGLISH    How does the primary learner prefer to learn new concepts? DEMONSTRATION    Answered By patient    Relationship to Learner SELF           Pt currently taking Anticoagulant therapy? no    Pt currently taking Antiplatelet therapy ? aspirin      Coordination of Care:  1. Have you been to the ER, urgent care clinic since your last visit? Hospitalized since your last visit? no    2. Have you seen or consulted any other health care providers outside of the [facility] since your last visit? Include any pap smears or colon screening. no    
  Occupational History    Not on file   Tobacco Use    Smoking status: Every Day     Current packs/day: 0.30     Average packs/day: 0.3 packs/day for 55.8 years (16.7 ttl pk-yrs)     Types: Cigarettes     Start date: 1969    Smokeless tobacco: Never   Vaping Use    Vaping status: Never Used   Substance and Sexual Activity    Alcohol use: No    Drug use: Not Currently     Types: Cocaine    Sexual activity: Not Currently     Partners: Male   Other Topics Concern    Not on file   Social History Narrative    Not on file     Social Determinants of Health     Financial Resource Strain: Low Risk  (6/19/2024)    Overall Financial Resource Strain (CARDIA)     Difficulty of Paying Living Expenses: Not very hard   Food Insecurity: No Food Insecurity (6/19/2024)    Hunger Vital Sign     Worried About Running Out of Food in the Last Year: Never true     Ran Out of Food in the Last Year: Never true   Transportation Needs: Unknown (6/19/2024)    PRAPARE - Transportation     Lack of Transportation (Medical): Not on file     Lack of Transportation (Non-Medical): No   Physical Activity: Insufficiently Active (5/1/2024)    Exercise Vital Sign     Days of Exercise per Week: 3 days     Minutes of Exercise per Session: 30 min   Stress: Not on file   Social Connections: Not on file   Intimate Partner Violence: Not on file   Housing Stability: Unknown (6/19/2024)    Housing Stability Vital Sign     Unable to Pay for Housing in the Last Year: Not on file     Number of Places Lived in the Last Year: Not on file     Unstable Housing in the Last Year: No        FH: neg    Review of Systems    14 pt Review of Systems is negative unless otherwise mentioned in the HPI.    Wt Readings from Last 3 Encounters:   10/23/24 62.1 kg (137 lb)   10/22/24 62.4 kg (137 lb 9.6 oz)   10/10/24 62.1 kg (137 lb)     Temp Readings from Last 3 Encounters:   10/22/24 97.7 °F (36.5 °C) (Temporal)   09/18/24 97.4 °F (36.3 °C) (Temporal)   09/09/24 97.4 °F (36.3 °C)

## 2024-10-23 NOTE — PATIENT INSTRUCTIONS
Echo Stress  PATIENT PREPS:   -Wear easy to remove shirt to your appointment for easier accessibility.  -Wear comfortable clothing and shoes suitable for walking on a treadmill. (NO sandals, flip flops, high heels, etc)   -The duration of this exam is approximately 2 to 4 hours.      **call office 3-5 days after testing is completed for results** Please ensure testing is completed prior to scheduled follow up appointment. If it is not completed your appointment may be rescheduled**

## 2024-10-29 ENCOUNTER — OFFICE VISIT (OUTPATIENT)
Age: 69
End: 2024-10-29
Payer: MEDICARE

## 2024-10-29 VITALS — WEIGHT: 137 LBS | BODY MASS INDEX: 24.27 KG/M2 | HEIGHT: 63 IN

## 2024-10-29 DIAGNOSIS — M70.61 TROCHANTERIC BURSITIS OF RIGHT HIP: Primary | ICD-10-CM

## 2024-10-29 DIAGNOSIS — M51.362 DEGENERATION OF INTERVERTEBRAL DISC OF LUMBAR REGION WITH DISCOGENIC BACK PAIN AND LOWER EXTREMITY PAIN: ICD-10-CM

## 2024-10-29 DIAGNOSIS — M70.62 TROCHANTERIC BURSITIS OF LEFT HIP: ICD-10-CM

## 2024-10-29 PROCEDURE — 99213 OFFICE O/P EST LOW 20 MIN: CPT

## 2024-10-29 PROCEDURE — 1125F AMNT PAIN NOTED PAIN PRSNT: CPT

## 2024-10-29 PROCEDURE — 1123F ACP DISCUSS/DSCN MKR DOCD: CPT

## 2024-10-29 RX ORDER — TRIAMCINOLONE ACETONIDE 40 MG/ML
80 INJECTION, SUSPENSION INTRA-ARTICULAR; INTRAMUSCULAR ONCE
Status: CANCELLED | OUTPATIENT
Start: 2024-10-29 | End: 2024-10-29

## 2024-10-29 RX ORDER — LIDOCAINE HYDROCHLORIDE 10 MG/ML
2 INJECTION, SOLUTION INFILTRATION; PERINEURAL ONCE
Status: CANCELLED | OUTPATIENT
Start: 2024-10-29 | End: 2024-10-29

## 2024-10-29 NOTE — PROGRESS NOTES
to face as needed for exema 2 times daily. 80 g 3    naloxone 4 MG/0.1ML LIQD nasal spray 1 spray by Nasal route as needed for Opioid Reversal 2 each 0    sennosides-docusate sodium (SENOKOT-S) 8.6-50 MG tablet Take 1 tablet by mouth daily 90 tablet 3    albuterol (ACCUNEB) 0.63 MG/3ML nebulizer solution Take 3 mLs by nebulization every 6 hours as needed for Wheezing      omeprazole (PRILOSEC) 20 MG delayed release capsule Take 1 capsule by mouth daily      diclofenac sodium (VOLTAREN) 1 % GEL Apply 4 g topically 4 times daily Using for multiple joints. 500 g 3    Cholecalciferol 50 MCG (2000 UT) TABS Take 1 tablet by mouth daily 30 tablet 5    ASPIRIN LOW DOSE 81 MG chewable tablet chew and swallow 1 tablet by mouth once daily 90 tablet 0    simvastatin (ZOCOR) 10 MG tablet take 1 tablet by mouth every evening 90 tablet 0    Humidifiers MISC Use as directed. Please include the vicks solution       No current facility-administered medications for this visit.        Allergies   Allergen Reactions    Gabapentin Anxiety    Ibuprofen Itching, Nausea Only and Swelling     swelling       Past Surgical History:   Procedure Laterality Date    BREAST BIOPSY  7/7/2014     BIOPSY RIGHT BREAST WITH NEEDLE LOCALIZATION performed by Cookie Foster MD at [facility] MAIN OR    BREAST LUMPECTOMY      RIGHT SIDE    GYN  1980's    removed tube for ectopic, not sure what side    GYN  2014    D&C    ORTHOPEDIC SURGERY  1980s    Left forearm cisco       Social History     Socioeconomic History    Marital status:      Spouse name: Not on file    Number of children: Not on file    Years of education: Not on file    Highest education level: Not on file   Occupational History    Not on file   Tobacco Use    Smoking status: Every Day     Current packs/day: 0.30     Average packs/day: 0.3 packs/day for 55.8 years (16.7 ttl pk-yrs)     Types: Cigarettes     Start date: 1969    Smokeless tobacco: Never   Vaping Use    Vaping status: Never

## 2024-11-03 DIAGNOSIS — J44.9 CHRONIC OBSTRUCTIVE PULMONARY DISEASE, UNSPECIFIED COPD TYPE (HCC): ICD-10-CM

## 2024-11-04 DIAGNOSIS — F11.20 OPIOID DEPENDENCE WITH CURRENT USE (HCC): ICD-10-CM

## 2024-11-04 DIAGNOSIS — Z02.89 PAIN MANAGEMENT CONTRACT AGREEMENT: ICD-10-CM

## 2024-11-04 RX ORDER — FLUTICASONE FUROATE, UMECLIDINIUM BROMIDE AND VILANTEROL TRIFENATATE 200; 62.5; 25 UG/1; UG/1; UG/1
1 POWDER RESPIRATORY (INHALATION) DAILY
Qty: 60 EACH | Refills: 0 | OUTPATIENT
Start: 2024-11-04

## 2024-11-04 RX ORDER — TRAZODONE HYDROCHLORIDE 50 MG/1
TABLET, FILM COATED ORAL
Qty: 60 TABLET | OUTPATIENT
Start: 2024-11-04

## 2024-11-04 NOTE — TELEPHONE ENCOUNTER
Medication(s) requesting:   Requested Prescriptions     Pending Prescriptions Disp Refills    linaclotide (LINZESS) 145 MCG capsule [Pharmacy Med Name: LINZESS 145MCG CAPSULES] 30 capsule 0     Sig: TAKE 1 CAPSULE BY MOUTH DAILY BEFORE BREAKFAST FOR CONSTIPATION        Last office visit:  10/22/24  Next office visit DMA: 12/20/2024

## 2024-11-11 ENCOUNTER — TELEPHONE (OUTPATIENT)
Facility: CLINIC | Age: 69
End: 2024-11-11

## 2024-11-11 NOTE — TELEPHONE ENCOUNTER
Pt called in to advise [facility] does not accept her insurance, she is requesting a referral elsewhere

## 2024-11-15 ENCOUNTER — TELEPHONE (OUTPATIENT)
Facility: CLINIC | Age: 69
End: 2024-11-15

## 2024-11-15 DIAGNOSIS — G89.4 CHRONIC PAIN SYNDROME: ICD-10-CM

## 2024-11-15 DIAGNOSIS — F11.20 OPIOID DEPENDENCE WITH CURRENT USE (HCC): ICD-10-CM

## 2024-11-15 DIAGNOSIS — Z02.89 PAIN MANAGEMENT CONTRACT AGREEMENT: ICD-10-CM

## 2024-11-15 DIAGNOSIS — M51.369 DDD (DEGENERATIVE DISC DISEASE), LUMBAR: ICD-10-CM

## 2024-11-15 RX ORDER — TRAMADOL HYDROCHLORIDE 50 MG/1
50 TABLET ORAL EVERY 12 HOURS PRN
Qty: 60 TABLET | Refills: 0 | Status: CANCELLED | OUTPATIENT
Start: 2024-11-15 | End: 2024-12-15

## 2024-11-15 NOTE — TELEPHONE ENCOUNTER
Patient is calling to request a refill on the below medication:    Has one pill left.       Please advise    Thank you

## 2024-11-15 NOTE — TELEPHONE ENCOUNTER
Medication(s) requesting:   Requested Prescriptions     Pending Prescriptions Disp Refills    traMADol (ULTRAM) 50 MG tablet 60 tablet 0     Sig: Take 1 tablet by mouth every 12 hours as needed for Pain for up to 30 days. Take lowest dose possible to manage pain Max Daily Amount: 100 mg        Last office visit:  10/22/24  Next office visit DMA: 12/20/2024

## 2024-11-16 DIAGNOSIS — G89.4 CHRONIC PAIN SYNDROME: ICD-10-CM

## 2024-11-16 DIAGNOSIS — Z02.89 PAIN MANAGEMENT CONTRACT AGREEMENT: ICD-10-CM

## 2024-11-16 DIAGNOSIS — M51.369 DDD (DEGENERATIVE DISC DISEASE), LUMBAR: ICD-10-CM

## 2024-11-16 DIAGNOSIS — F11.20 OPIOID DEPENDENCE WITH CURRENT USE (HCC): ICD-10-CM

## 2024-11-18 RX ORDER — TRAMADOL HYDROCHLORIDE 50 MG/1
50 TABLET ORAL EVERY 12 HOURS PRN
Qty: 60 TABLET | Refills: 0 | Status: SHIPPED | OUTPATIENT
Start: 2024-11-26 | End: 2024-12-26

## 2024-11-18 RX ORDER — TRAMADOL HYDROCHLORIDE 50 MG/1
TABLET ORAL
Qty: 60 TABLET | OUTPATIENT
Start: 2024-11-18

## 2024-11-18 NOTE — TELEPHONE ENCOUNTER
Medication(s) requesting:   Requested Prescriptions     Pending Prescriptions Disp Refills    traMADol (ULTRAM) 50 MG tablet 60 tablet 0     Sig: Take 1 tablet by mouth every 12 hours as needed for Pain for up to 30 days. Take lowest dose possible to manage pain Max Daily Amount: 100 mg        Last office visit:  10/22/24  Next office visit DMA: 11/16/2024

## 2024-11-20 NOTE — TELEPHONE ENCOUNTER
Patient called in requesting medication refill for the following:     Omeprazole (PRILOSEC) 20 MG delayed release capsule     LOV: 10/22/24   NOV: 12/20/24   Please be advised, thank you.

## 2024-11-22 PROBLEM — Z12.11 ENCOUNTER FOR SCREENING FOR MALIGNANT NEOPLASM OF COLON: Status: RESOLVED | Noted: 2024-10-23 | Resolved: 2024-11-22

## 2024-12-03 NOTE — TELEPHONE ENCOUNTER
Medication(s) requesting:   Requested Prescriptions     Pending Prescriptions Disp Refills    omeprazole (PRILOSEC) 20 MG delayed release capsule 30 capsule      Sig: Take 1 capsule by mouth daily        Last office visit:  10/22/2024  Next office visit DMA: 12/20/2024

## 2024-12-03 NOTE — TELEPHONE ENCOUNTER
Pt called in requesting two medication refills:    LOV: 10.22.24    NOV: 12.20.24    -Senna 8.6 MG    -Omeprazole 20 MG

## 2024-12-04 DIAGNOSIS — Z02.89 PAIN MANAGEMENT CONTRACT AGREEMENT: ICD-10-CM

## 2024-12-04 DIAGNOSIS — F11.20 OPIOID DEPENDENCE WITH CURRENT USE (HCC): ICD-10-CM

## 2024-12-04 RX ORDER — TRAZODONE HYDROCHLORIDE 50 MG/1
TABLET, FILM COATED ORAL
Qty: 60 TABLET | Refills: 0 | Status: SHIPPED | OUTPATIENT
Start: 2024-12-04

## 2024-12-04 NOTE — TELEPHONE ENCOUNTER
Medication(s) requesting:   Requested Prescriptions     Pending Prescriptions Disp Refills    linaclotide (LINZESS) 145 MCG capsule [Pharmacy Med Name: LINZESS 145MCG CAPSULES] 30 capsule 0     Sig: TAKE 1 CAPSULE BY MOUTH DAILY BEFORE BREAKFAST FOR CONSTIPATION    traZODone (DESYREL) 50 MG tablet [Pharmacy Med Name: TRAZODONE 50MG TABLETS] 60 tablet      Sig: TAKE 2 TABLETS BY MOUTH EVERY EVENING AS NEEDED FOR INSOMNIA        Last office visit:  10/22/2024  Next office visit DMA: 12/20/2024   Unknown

## 2024-12-16 ENCOUNTER — COMMUNITY OUTREACH (OUTPATIENT)
Facility: CLINIC | Age: 69
End: 2024-12-16

## 2024-12-16 NOTE — PROGRESS NOTES
Patient's HM shows they are overdue for Colorectal Screening.   Care Everywhere and  files searched.  No results to attach to order nor HM updated.       
Patient

## 2024-12-20 ENCOUNTER — OFFICE VISIT (OUTPATIENT)
Facility: CLINIC | Age: 69
End: 2024-12-20

## 2024-12-20 VITALS
HEIGHT: 63 IN | SYSTOLIC BLOOD PRESSURE: 122 MMHG | BODY MASS INDEX: 24.63 KG/M2 | WEIGHT: 139 LBS | HEART RATE: 82 BPM | RESPIRATION RATE: 14 BRPM | OXYGEN SATURATION: 90 % | DIASTOLIC BLOOD PRESSURE: 82 MMHG | TEMPERATURE: 97.5 F

## 2024-12-20 DIAGNOSIS — R25.2 MUSCLE CRAMPS: Primary | ICD-10-CM

## 2024-12-20 DIAGNOSIS — R53.82 CHRONIC FATIGUE: ICD-10-CM

## 2024-12-20 DIAGNOSIS — I10 ESSENTIAL (PRIMARY) HYPERTENSION: ICD-10-CM

## 2024-12-20 DIAGNOSIS — J45.40 MODERATE PERSISTENT ASTHMA WITHOUT COMPLICATION: ICD-10-CM

## 2024-12-20 DIAGNOSIS — K90.49 MALABSORPTION DUE TO INTOLERANCE, NOT ELSEWHERE CLASSIFIED: ICD-10-CM

## 2024-12-20 RX ORDER — ALBUTEROL SULFATE 0.63 MG/3ML
1 SOLUTION RESPIRATORY (INHALATION) EVERY 6 HOURS PRN
Qty: 270 ML | Refills: 5 | Status: SHIPPED | OUTPATIENT
Start: 2024-12-20

## 2024-12-20 RX ORDER — VITAMIN E 268 MG
800 CAPSULE ORAL
Qty: 30 CAPSULE | Refills: 3 | Status: SHIPPED | OUTPATIENT
Start: 2024-12-20

## 2024-12-20 RX ORDER — AMLODIPINE BESYLATE 5 MG/1
5 TABLET ORAL DAILY
COMMUNITY

## 2024-12-20 RX ORDER — B-COMPLEX WITH VITAMIN C
1 TABLET ORAL 2 TIMES DAILY
Qty: 180 TABLET | Refills: 3 | Status: SHIPPED | OUTPATIENT
Start: 2024-12-20

## 2024-12-20 NOTE — PROGRESS NOTES
Mayo Salinas (:  1955) is a 69 y.o. female here for evaluation of the following chief complaint(s):  Follow-up, Discuss Medications, Medication Refill, and Other (COPD)        ASSESSMENT/PLAN:  1. Muscle cramps  Assessment & Plan:  - possible electrolyte/vitamin deficiency  - check 'lytes and trial of vitamin E 800 units QHS and B complex BID  Orders:  -     Comprehensive Metabolic Panel; Future  -     Phosphorus; Future  -     Magnesium; Future  -     vitamin E 400 UNIT capsule; Take 2 capsules by mouth nightly, Disp-30 capsule, R-3Normal  -     B Complex Vitamins (VITAMIN B COMPLEX) TABS; Take 1 tablet by mouth in the morning and at bedtime, Disp-180 tablet, R-3Normal  2. Chronic fatigue  Assessment & Plan:  - possible sleep deprivation, hypothyroid, chronic fatigue syndrome, anemia, vitamin def/insuf  - optimize sleep hygiene  - labs     Orders:  -     Sedimentation Rate; Future  -     High sensitivity CRP; Future  -     CK; Future  -     TSH; Future  -     Comprehensive Metabolic Panel; Future  -     CBC with Auto Differential; Future  -     T3; Future  -     T4, Free; Future  -     Vitamin D 25 Hydroxy; Future  3. Essential (primary) hypertension  Assessment & Plan:  - controlled on amlodipine 5   Orders:  -     High sensitivity CRP; Future  4. Malabsorption due to intolerance, not elsewhere classified  -     Vitamin D 25 Hydroxy; Future  5. Moderate persistent asthma without complication  -     albuterol (ACCUNEB) 0.63 MG/3ML nebulizer solution; Take 3 mLs by nebulization every 6 hours as needed for Wheezing, Disp-270 mL, R-5Normal      Follow-up and Dispositions    Return in about 4 weeks (around 2025) for muscle cramps.         SUBJECTIVE/OBJECTIVE:  HPI  Muscle Cramps  - toes, calves, L thigh, hands    Fatigue  - has poor sleep also, uses trazadone    ROS as stated above.    /82 (Site: Left Upper Arm, Position: Sitting, Cuff Size: Large Adult)   Pulse 82   Temp 97.5 °F (36.4

## 2024-12-20 NOTE — ASSESSMENT & PLAN NOTE
- possible sleep deprivation, hypothyroid, chronic fatigue syndrome, anemia, vitamin def/insuf  - optimize sleep hygiene  - labs

## 2024-12-20 NOTE — PROGRESS NOTES
Mayo Salinas is a 69 y.o. year old female who presents today for   Chief Complaint   Patient presents with    Follow-up    Discuss Medications    Medication Refill    Other     COPD        \"Have you been to the ER, urgent care clinic since your last visit?  Hospitalized since your last visit?\"   YES [facility]   When: 11/21/2024   Reason: EYE PROBLEM     “Have you seen or consulted any other health care providers outside our system since your last visit?”   NO       “Have you had a colorectal cancer screening such as a colonoscopy/FIT/Cologuard?    NO    Date of last Colonoscopy: 6/7/2013  No cologuard on file  No FIT/FOBT on file   No flexible sigmoidoscopy on file           Emy Sheehan CMA  [facility]  Ph: [phone]  Fax: [phone]

## 2024-12-20 NOTE — ASSESSMENT & PLAN NOTE
- possible electrolyte/vitamin deficiency  - check 'lytes and trial of vitamin E 800 units QHS and B complex BID

## 2024-12-23 ENCOUNTER — TELEPHONE (OUTPATIENT)
Facility: CLINIC | Age: 69
End: 2024-12-23

## 2024-12-23 ENCOUNTER — HOSPITAL ENCOUNTER (OUTPATIENT)
Facility: HOSPITAL | Age: 69
Setting detail: SPECIMEN
Discharge: HOME | End: 2024-12-26
Payer: MEDICARE

## 2024-12-23 LAB
25(OH)D3 SERPL-MCNC: 16.8 NG/ML (ref 30–100)
ALBUMIN SERPL-MCNC: 4.1 G/DL (ref 3.4–5)
ALBUMIN/GLOB SERPL: 1.4 (ref 0.8–1.7)
ALP SERPL-CCNC: 64 U/L (ref 45–117)
ALT SERPL-CCNC: 16 U/L (ref 13–56)
ANION GAP SERPL CALC-SCNC: 1 MMOL/L (ref 3–18)
AST SERPL-CCNC: 20 U/L (ref 10–38)
BASOPHILS # BLD: 0 K/UL (ref 0–0.1)
BASOPHILS NFR BLD: 1 % (ref 0–2)
BILIRUB SERPL-MCNC: 0.7 MG/DL (ref 0.2–1)
BUN SERPL-MCNC: 13 MG/DL (ref 7–18)
BUN/CREAT SERPL: 12 (ref 12–20)
CALCIUM SERPL-MCNC: 8.5 MG/DL (ref 8.5–10.1)
CHLORIDE SERPL-SCNC: 107 MMOL/L (ref 100–111)
CK SERPL-CCNC: 209 U/L (ref 26–192)
CO2 SERPL-SCNC: 29 MMOL/L (ref 21–32)
CREAT SERPL-MCNC: 1.05 MG/DL (ref 0.6–1.3)
CRP SERPL HS-MCNC: 2 MG/L
DIFFERENTIAL METHOD BLD: ABNORMAL
EOSINOPHIL # BLD: 0.3 K/UL (ref 0–0.4)
EOSINOPHIL NFR BLD: 3 % (ref 0–5)
ERYTHROCYTE [DISTWIDTH] IN BLOOD BY AUTOMATED COUNT: 15.9 % (ref 11.6–14.5)
ERYTHROCYTE [SEDIMENTATION RATE] IN BLOOD: <1 MM/HR (ref 0–30)
GLOBULIN SER CALC-MCNC: 2.9 G/DL (ref 2–4)
GLUCOSE SERPL-MCNC: 73 MG/DL (ref 74–99)
HCT VFR BLD AUTO: 52.2 % (ref 35–45)
HGB BLD-MCNC: 14.8 G/DL (ref 12–16)
IMM GRANULOCYTES # BLD AUTO: 0.1 K/UL (ref 0–0.04)
IMM GRANULOCYTES NFR BLD AUTO: 1 % (ref 0–0.5)
LYMPHOCYTES # BLD: 2.7 K/UL (ref 0.9–3.6)
LYMPHOCYTES NFR BLD: 34 % (ref 21–52)
MAGNESIUM SERPL-MCNC: 2.8 MG/DL (ref 1.6–2.6)
MCH RBC QN AUTO: 30.8 PG (ref 24–34)
MCHC RBC AUTO-ENTMCNC: 28.4 G/DL (ref 31–37)
MCV RBC AUTO: 108.8 FL (ref 78–100)
MONOCYTES # BLD: 0.5 K/UL (ref 0.05–1.2)
MONOCYTES NFR BLD: 6 % (ref 3–10)
NEUTS SEG # BLD: 4.3 K/UL (ref 1.8–8)
NEUTS SEG NFR BLD: 55 % (ref 40–73)
NRBC # BLD: 0 K/UL (ref 0–0.01)
NRBC BLD-RTO: 0 PER 100 WBC
PHOSPHATE SERPL-MCNC: 6.4 MG/DL (ref 2.5–4.9)
PLATELET # BLD AUTO: 260 K/UL (ref 135–420)
PMV BLD AUTO: 9.9 FL (ref 9.2–11.8)
POTASSIUM SERPL-SCNC: 6.4 MMOL/L (ref 3.5–5.5)
PROT SERPL-MCNC: 7 G/DL (ref 6.4–8.2)
RBC # BLD AUTO: 4.8 M/UL (ref 4.2–5.3)
SODIUM SERPL-SCNC: 137 MMOL/L (ref 136–145)
T4 FREE SERPL-MCNC: 0.8 NG/DL (ref 0.7–1.5)
TSH SERPL DL<=0.05 MIU/L-ACNC: 1.21 UIU/ML (ref 0.36–3.74)
WBC # BLD AUTO: 7.8 K/UL (ref 4.6–13.2)

## 2024-12-23 PROCEDURE — 82306 VITAMIN D 25 HYDROXY: CPT

## 2024-12-23 PROCEDURE — 80053 COMPREHEN METABOLIC PANEL: CPT

## 2024-12-23 PROCEDURE — 84100 ASSAY OF PHOSPHORUS: CPT

## 2024-12-23 PROCEDURE — 36415 COLL VENOUS BLD VENIPUNCTURE: CPT

## 2024-12-23 PROCEDURE — 86141 C-REACTIVE PROTEIN HS: CPT

## 2024-12-23 PROCEDURE — 84480 ASSAY TRIIODOTHYRONINE (T3): CPT

## 2024-12-23 PROCEDURE — 85652 RBC SED RATE AUTOMATED: CPT

## 2024-12-23 PROCEDURE — 85025 COMPLETE CBC W/AUTO DIFF WBC: CPT

## 2024-12-23 PROCEDURE — 82550 ASSAY OF CK (CPK): CPT

## 2024-12-23 PROCEDURE — 83735 ASSAY OF MAGNESIUM: CPT

## 2024-12-23 PROCEDURE — 84439 ASSAY OF FREE THYROXINE: CPT

## 2024-12-23 PROCEDURE — 84443 ASSAY THYROID STIM HORMONE: CPT

## 2024-12-23 RX ORDER — TRIAMCINOLONE ACETONIDE 0.25 MG/G
CREAM TOPICAL
Qty: 80 G | Refills: 3 | Status: SHIPPED | OUTPATIENT
Start: 2024-12-23

## 2024-12-23 NOTE — TELEPHONE ENCOUNTER
Medication(s) requesting:   Requested Prescriptions     Pending Prescriptions Disp Refills    triamcinolone (KENALOG) 0.025 % cream [Pharmacy Med Name: TRIAMCINOLONE 0.025% CREAM 80GM] 80 g 3     Sig: APPLY TOPICALLY TO THE FACE AS NEEDED FOR EXEMA TWICE DAILY        Last office visit:  12/20/2024  Next office visit DMA: 1/17/2025

## 2024-12-23 NOTE — TELEPHONE ENCOUNTER
K 6.4. Spoke with pt who denied any sxs. I encouraged her to be evaluated in an ED for high risk of cardiac event. Pt conveyed understnading.    Bradly Ramsay MD

## 2024-12-23 NOTE — TELEPHONE ENCOUNTER
Swathi called in w/ [facility] requesting to speak w/ the nurse or PCP regarding a critical lab result for the pt

## 2024-12-24 LAB — T3 SERPL-MCNC: 118 NG/DL (ref 71–180)

## 2025-01-03 DIAGNOSIS — J44.9 CHRONIC OBSTRUCTIVE PULMONARY DISEASE, UNSPECIFIED COPD TYPE (HCC): ICD-10-CM

## 2025-01-03 NOTE — TELEPHONE ENCOUNTER
Medication(s) requesting:   Requested Prescriptions     Pending Prescriptions Disp Refills    albuterol sulfate HFA (PROVENTIL;VENTOLIN;PROAIR) 108 (90 Base) MCG/ACT inhaler [Pharmacy Med Name: ALBUTEROL HFA INH (200 PUFFS) 18GM] 54 g 1     Sig: INHALE 2 PUFFS BY MOUTH INTO THE LUNGS FOUR TIMES DAILY AS NEEDED FOR WHEEZING    traZODone (DESYREL) 50 MG tablet [Pharmacy Med Name: TRAZODONE 50MG TABLETS] 60 tablet 0     Sig: TAKE 2 TABLETS BY MOUTH EVERY EVENING AS NEEDED FOR INSOMNIA        Last office visit:  12/20/24  Next office visit DMA: 1/17/2025

## 2025-01-06 RX ORDER — TRAZODONE HYDROCHLORIDE 50 MG/1
TABLET, FILM COATED ORAL
Qty: 60 TABLET | Refills: 0 | Status: SHIPPED | OUTPATIENT
Start: 2025-01-06

## 2025-01-06 RX ORDER — ALBUTEROL SULFATE 90 UG/1
INHALANT RESPIRATORY (INHALATION)
Qty: 54 G | Refills: 1 | Status: SHIPPED | OUTPATIENT
Start: 2025-01-06

## 2025-01-10 NOTE — TELEPHONE ENCOUNTER
Med Refill Request  Medication(s) requesting:   Requested Prescriptions        Pending Prescriptions Disp Refills   traMADol (ULTRAM) 50 MG tablet     amLODIPine (NORVASC) 5 MG tablet      omeprazole (PRILOSEC) 20 MG delayed release capsule     simvastatin (ZOCOR) 10 MG tablet  60 tab    N/A    30 caps      90 tabs 0      N/A    1        0       Last OV: 12/20/24  Next Appt: 01/17/25

## 2025-01-10 NOTE — TELEPHONE ENCOUNTER
Medication(s) requesting:   Requested Prescriptions     Pending Prescriptions Disp Refills    amLODIPine (NORVASC) 5 MG tablet 30 tablet      Sig: Take 1 tablet by mouth daily    simvastatin (ZOCOR) 10 MG tablet 90 tablet 0     Sig: Take 1 tablet by mouth nightly    omeprazole (PRILOSEC) 20 MG delayed release capsule 30 capsule 1     Sig: Take 1 capsule by mouth daily        Last office visit:  12/20/24  Next office visit DMA: 1/17/2025

## 2025-01-13 DIAGNOSIS — Z02.89 PAIN MANAGEMENT CONTRACT AGREEMENT: ICD-10-CM

## 2025-01-13 DIAGNOSIS — M70.62 TROCHANTERIC BURSITIS OF LEFT HIP: ICD-10-CM

## 2025-01-13 DIAGNOSIS — F11.20 OPIOID DEPENDENCE WITH CURRENT USE (HCC): ICD-10-CM

## 2025-01-13 DIAGNOSIS — G89.4 CHRONIC PAIN SYNDROME: ICD-10-CM

## 2025-01-13 DIAGNOSIS — M70.61 TROCHANTERIC BURSITIS OF RIGHT HIP: ICD-10-CM

## 2025-01-13 RX ORDER — SIMVASTATIN 10 MG
10 TABLET ORAL NIGHTLY
Qty: 90 TABLET | Refills: 0 | Status: SHIPPED | OUTPATIENT
Start: 2025-01-13

## 2025-01-13 RX ORDER — AMLODIPINE BESYLATE 5 MG/1
5 TABLET ORAL DAILY
Qty: 90 TABLET | Refills: 1 | Status: SHIPPED | OUTPATIENT
Start: 2025-01-13

## 2025-01-13 RX ORDER — MELOXICAM 15 MG/1
15 TABLET ORAL DAILY
Qty: 90 TABLET | Refills: 3 | OUTPATIENT
Start: 2025-01-13

## 2025-01-13 RX ORDER — BACLOFEN 10 MG/1
10 TABLET ORAL 2 TIMES DAILY
Qty: 180 TABLET | Refills: 3 | OUTPATIENT
Start: 2025-01-13

## 2025-01-13 NOTE — TELEPHONE ENCOUNTER
Chief Complaint   Patient presents with   • Irritable Bowel Syndrome     25 years     Karishma Brumfield is a 58 y.o. female here for annual exam and to discuss below conditions.    Irritable bowel syndrome with diarrhea  Long standing issue dating back to about age 32.  Increase in symptoms over the last few months although she has been able to connect this to dietary triggers-eating out more, fried foods.  She has had a flare about once a week of abdominal cramping and diarrhea which resolves with over-the-counter Imodium.  No blood or mucus in stool, no chronic abdominal pain.  She sometimes gets constipated as a result of using the Imodium.  She is up-to-date on colonoscopy    Essential hypertension  Chronic issue managed with losartan and hctz, home blood pressure readings controlled.  No chest pain, dizziness, palpitation    Elevated LFTs  Resolved in most recent metabolic panel     Generalized anxiety disorder  Continues doing well on sertraline, no current feelings of anxiety.    Osteoporosis without current pathological fracture  Chronic issue treated with Fosamax    Current medicines (including changes today)  Current Outpatient Medications   Medication Sig Dispense Refill   • losartan (COZAAR) 50 MG Tab TAKE ONE TABLET BY MOUTH EVERY DAY 90 Tab 1   • hydroCHLOROthiazide (HYDRODIURIL) 12.5 MG tablet TAKE ONE TABLET BY MOUTH EVERY DAY -GENERIC FOR HYDRODIURIL 90 Tab 1   • valacyclovir (VALTREX) 1 GM Tab Take 1 Tab by mouth 2 times a day. 20 Tab 0   • sertraline (ZOLOFT) 50 MG Tab Take 1 Tab by mouth every day. 90 Tab 3   • Cholecalciferol (VITAMIN D3) 2000 UNIT CAPS Take 1 Cap by mouth every day.     • Cyanocobalamin (VITAMIN B-12) 1000 MCG TABS Take 1,000 mcg by mouth every day.     • Calcium Carbonate-Vitamin D (CALCIUM 600+D PO) Take 1 Tab by mouth every day.     • alendronate (FOSAMAX) 70 MG Tab TAKE 1 TABLET BY MOUTH EVERY 7 DAYS 12 Tab 3   • Psyllium (METAMUCIL PO) Take  by mouth.       No current  Medication(s) requesting:   Requested Prescriptions     Pending Prescriptions Disp Refills    baclofen (LIORESAL) 10 MG tablet 180 tablet 3     Sig: Take 1 tablet by mouth 2 times daily    meloxicam (MOBIC) 15 MG tablet 90 tablet 3     Sig: Take 1 tablet by mouth daily    linaclotide (LINZESS) 145 MCG capsule 30 capsule 1     Sig: TAKE 1 CAPSULE BY MOUTH DAILY BEFORE BREAKFAST FOR CONSTIPATION        Last office visit:  12/20/24  Next office visit DMA: 1/17/2025   "facility-administered medications for this visit.      She  has a past medical history of Anxiety, Depression, Hypertension, and UTI (lower urinary tract infection).  She  has a past surgical history that includes perineal procedure; tonsillectomy; breast biopsy; and cyst excision.  Social History     Tobacco Use   • Smoking status: Never Smoker   • Smokeless tobacco: Never Used   Substance Use Topics   • Alcohol use: Yes     Alcohol/week: 0.0 oz     Comment: rare   • Drug use: No     Social History     Social History Narrative   • Not on file     Family History   Problem Relation Age of Onset   • Arthritis Mother    • Hypertension Mother    • Hypertension Father    • Hypertension Sister    • Cancer Neg Hx    • Diabetes Neg Hx    • Heart Disease Neg Hx    • Stroke Neg Hx      Family Status   Relation Name Status   • Mo  Alive   • Fa  Alive   • Sis  Alive   • Bro  Alive   • Helen  Alive   • Son  Alive   • Sis  Alive   • Bro  Alive   • Neg Hx  (Not Specified)         ROS  Problems listed discussed above, all other systems reviewed and negative     Objective:     /70 (BP Location: Left arm, Patient Position: Sitting, BP Cuff Size: Adult)   Pulse (!) 56   Temp 37.2 °C (99 °F) (Temporal)   Resp 14   Ht 1.626 m (5' 4\")   Wt 59 kg (130 lb 1.1 oz)   SpO2 96%  Body mass index is 22.33 kg/m².  Physical Exam:  General: Alert, oriented in no acute distress.  Eye contact is good, speech is normal, affect calm  HEENT:  Oral mucosa pink moist, no lesions. Nares patent. TMs gray with good landmarks bilaterally. No cervical or supraclavicular lymphadenopathy, thyroid isthmus palpable without masses or nodules.  Lungs: clear to auscultation bilaterally, good aeration, normal effort. No wheeze/ rhonchi/ rales.  CV: regular rate and rhythm, S1, S2. No murmur, no JVD, no edema. Pedal pulses 2 + bilaterally  Abdomen: soft, nontender, BS x4, no hepatosplenomegaly.  Ext: color normal, vascularity normal, temperature normal. No " rash or lesions.    Assessment and Plan:   The following treatment plan was discussed   1. Annual physical exam  Normal physical exam. General health and wellness discussion including healthy diet, regular exercise. 2000 iu Vit d3 advised daily. Preventative health screenings as listed.  Mammogram due in December, Pap up-to-date. Advised regular dental cleanings, eye exam yearly.  Comp Metabolic Panel    CBC WITH DIFFERENTIAL    Lipid Profile    VITAMIN D,25 HYDROXY   2. Irritable bowel syndrome with diarrhea   increase in diarrhea flares over the last few weeks but she has been able to connect this to poor dietary intake.  She is already making some changes to her diet and has seen some improvement.  If this becomes more persistent and uncontrolled she will contact me.  Dietary recommendations reviewed  CBC WITH DIFFERENTIAL   3. Essential hypertension   stable  Comp Metabolic Panel   4. Low serum vitamin D   currently on vitamin D supplement  VITAMIN D,25 HYDROXY   5. Elevated LFTs   resolved and most recent labs, occasionally having 1 to 2 glasses of wine.  She has had liver ultrasound which showed hemangiomas in the past.  Continue to monitor  Comp Metabolic Panel   6. Generalized anxiety disorder   stable   7. Age-related osteoporosis without current pathological fracture   on Fosamax  DS-BONE DENSITY STUDY (DEXA)   8. Postmenopausal status     9. Screening for breast cancer  MA-SCREENING MAMMO BILAT W/TOMOSYNTHESIS W/CAD   10. Lipid screening  Lipid Profile       Educated in proper administration of medication(s) ordered today including safety, possible SE, risks, benefits, rationale and alternatives to therapy.     Followup: Pending labs             Please note that this dictation was created using voice recognition software. I have worked with consultants from the vendor as well as technical experts from [employer] to optimize the interface. I have made every reasonable attempt to correct obvious errors,  but I expect that there are errors of grammar and possibly content that I did not discover before finalizing the note.

## 2025-01-14 DIAGNOSIS — G89.4 CHRONIC PAIN SYNDROME: ICD-10-CM

## 2025-01-14 DIAGNOSIS — Z02.89 PAIN MANAGEMENT CONTRACT AGREEMENT: ICD-10-CM

## 2025-01-14 DIAGNOSIS — M51.369 DDD (DEGENERATIVE DISC DISEASE), LUMBAR: ICD-10-CM

## 2025-01-14 DIAGNOSIS — F11.20 OPIOID DEPENDENCE WITH CURRENT USE (HCC): ICD-10-CM

## 2025-01-14 RX ORDER — TRAMADOL HYDROCHLORIDE 50 MG/1
50 TABLET ORAL 2 TIMES DAILY PRN
Qty: 60 TABLET | Refills: 0 | Status: SHIPPED | OUTPATIENT
Start: 2025-01-14 | End: 2025-02-13

## 2025-01-14 NOTE — TELEPHONE ENCOUNTER
Medication(s) requesting:   Requested Prescriptions     Pending Prescriptions Disp Refills    traMADol (ULTRAM) 50 MG tablet [Pharmacy Med Name: TRAMADOL 50MG TABLETS] 60 tablet      Sig: TAKE 1 TABLET BY MOUTH EVERY 12 HOURS AS NEEDED FOR PAIN. TAKE LOWEST DOSE POSSIBLE TO MANAGE PAIN. MAX DAILY AMOUNT: 100 MG        Last office visit:  12/20/24  Next office visit DMA: 1/17/2025

## 2025-01-17 ENCOUNTER — OFFICE VISIT (OUTPATIENT)
Facility: CLINIC | Age: 70
End: 2025-01-17

## 2025-01-17 ENCOUNTER — HOSPITAL ENCOUNTER (OUTPATIENT)
Facility: HOSPITAL | Age: 70
Setting detail: SPECIMEN
Discharge: HOME | End: 2025-01-20
Payer: MEDICARE

## 2025-01-17 VITALS
BODY MASS INDEX: 24.88 KG/M2 | SYSTOLIC BLOOD PRESSURE: 127 MMHG | OXYGEN SATURATION: 90 % | RESPIRATION RATE: 16 BRPM | WEIGHT: 140.4 LBS | DIASTOLIC BLOOD PRESSURE: 82 MMHG | HEIGHT: 63 IN | HEART RATE: 69 BPM | TEMPERATURE: 97.7 F

## 2025-01-17 DIAGNOSIS — R25.2 MUSCLE CRAMPS: ICD-10-CM

## 2025-01-17 DIAGNOSIS — Z12.11 ENCOUNTER FOR COLORECTAL CANCER SCREENING: ICD-10-CM

## 2025-01-17 DIAGNOSIS — G89.4 CHRONIC PAIN SYNDROME: ICD-10-CM

## 2025-01-17 DIAGNOSIS — R53.82 CHRONIC FATIGUE: ICD-10-CM

## 2025-01-17 DIAGNOSIS — Z12.12 ENCOUNTER FOR COLORECTAL CANCER SCREENING: ICD-10-CM

## 2025-01-17 DIAGNOSIS — E55.9 VITAMIN D DEFICIENCY: Primary | ICD-10-CM

## 2025-01-17 DIAGNOSIS — F33.42 RECURRENT MAJOR DEPRESSIVE DISORDER, IN FULL REMISSION (HCC): ICD-10-CM

## 2025-01-17 DIAGNOSIS — F51.01 PRIMARY INSOMNIA: ICD-10-CM

## 2025-01-17 DIAGNOSIS — E87.5 HYPERKALEMIA: ICD-10-CM

## 2025-01-17 LAB
ANION GAP SERPL CALC-SCNC: 2 MMOL/L (ref 3–18)
BUN SERPL-MCNC: 11 MG/DL (ref 7–18)
BUN/CREAT SERPL: 13 (ref 12–20)
CALCIUM SERPL-MCNC: 9.2 MG/DL (ref 8.5–10.1)
CHLORIDE SERPL-SCNC: 109 MMOL/L (ref 100–111)
CO2 SERPL-SCNC: 29 MMOL/L (ref 21–32)
CREAT SERPL-MCNC: 0.87 MG/DL (ref 0.6–1.3)
FOLATE SERPL-MCNC: 4.4 NG/ML (ref 3.1–17.5)
GLUCOSE SERPL-MCNC: 86 MG/DL (ref 74–99)
POTASSIUM SERPL-SCNC: 4.1 MMOL/L (ref 3.5–5.5)
SODIUM SERPL-SCNC: 140 MMOL/L (ref 136–145)
VIT B12 SERPL-MCNC: 209 PG/ML (ref 211–911)

## 2025-01-17 PROCEDURE — 80048 BASIC METABOLIC PNL TOTAL CA: CPT

## 2025-01-17 PROCEDURE — 82607 VITAMIN B-12: CPT

## 2025-01-17 PROCEDURE — 82746 ASSAY OF FOLIC ACID SERUM: CPT

## 2025-01-17 PROCEDURE — 36415 COLL VENOUS BLD VENIPUNCTURE: CPT

## 2025-01-17 RX ORDER — MIRTAZAPINE 15 MG/1
15 TABLET, FILM COATED ORAL NIGHTLY
Qty: 90 TABLET | Refills: 1 | Status: SHIPPED | OUTPATIENT
Start: 2025-01-17

## 2025-01-17 RX ORDER — SENNA AND DOCUSATE SODIUM 50; 8.6 MG/1; MG/1
1 TABLET, FILM COATED ORAL DAILY
Qty: 90 TABLET | Refills: 3 | Status: SHIPPED | OUTPATIENT
Start: 2025-01-17

## 2025-01-17 RX ORDER — ERGOCALCIFEROL 1.25 MG/1
50000 CAPSULE, LIQUID FILLED ORAL WEEKLY
Qty: 12 CAPSULE | Refills: 1 | Status: SHIPPED | OUTPATIENT
Start: 2025-01-17

## 2025-01-17 RX ORDER — LIDOCAINE 50 MG/G
1 PATCH TOPICAL DAILY
COMMUNITY
Start: 2024-12-05

## 2025-01-17 RX ORDER — VITAMIN E 268 MG
800 CAPSULE ORAL
Qty: 30 CAPSULE | Refills: 3 | Status: SHIPPED | OUTPATIENT
Start: 2025-01-17

## 2025-01-17 SDOH — ECONOMIC STABILITY: FOOD INSECURITY: WITHIN THE PAST 12 MONTHS, YOU WORRIED THAT YOUR FOOD WOULD RUN OUT BEFORE YOU GOT MONEY TO BUY MORE.: NEVER TRUE

## 2025-01-17 SDOH — ECONOMIC STABILITY: FOOD INSECURITY: WITHIN THE PAST 12 MONTHS, THE FOOD YOU BOUGHT JUST DIDN'T LAST AND YOU DIDN'T HAVE MONEY TO GET MORE.: NEVER TRUE

## 2025-01-17 ASSESSMENT — PATIENT HEALTH QUESTIONNAIRE - PHQ9
SUM OF ALL RESPONSES TO PHQ9 QUESTIONS 1 & 2: 3
2. FEELING DOWN, DEPRESSED OR HOPELESS: NOT AT ALL
6. FEELING BAD ABOUT YOURSELF - OR THAT YOU ARE A FAILURE OR HAVE LET YOURSELF OR YOUR FAMILY DOWN: NOT AT ALL
9. THOUGHTS THAT YOU WOULD BE BETTER OFF DEAD, OR OF HURTING YOURSELF: NOT AT ALL
SUM OF ALL RESPONSES TO PHQ QUESTIONS 1-9: 9
SUM OF ALL RESPONSES TO PHQ QUESTIONS 1-9: 9
8. MOVING OR SPEAKING SO SLOWLY THAT OTHER PEOPLE COULD HAVE NOTICED. OR THE OPPOSITE, BEING SO FIGETY OR RESTLESS THAT YOU HAVE BEEN MOVING AROUND A LOT MORE THAN USUAL: NOT AT ALL
3. TROUBLE FALLING OR STAYING ASLEEP: NOT AT ALL
1. LITTLE INTEREST OR PLEASURE IN DOING THINGS: NEARLY EVERY DAY
7. TROUBLE CONCENTRATING ON THINGS, SUCH AS READING THE NEWSPAPER OR WATCHING TELEVISION: NOT AT ALL
SUM OF ALL RESPONSES TO PHQ QUESTIONS 1-9: 9
5. POOR APPETITE OR OVEREATING: NEARLY EVERY DAY
10. IF YOU CHECKED OFF ANY PROBLEMS, HOW DIFFICULT HAVE THESE PROBLEMS MADE IT FOR YOU TO DO YOUR WORK, TAKE CARE OF THINGS AT HOME, OR GET ALONG WITH OTHER PEOPLE: VERY DIFFICULT
4. FEELING TIRED OR HAVING LITTLE ENERGY: NEARLY EVERY DAY
SUM OF ALL RESPONSES TO PHQ QUESTIONS 1-9: 9

## 2025-01-17 NOTE — ASSESSMENT & PLAN NOTE
- vit D25 < 20  - D2 82670 IU weekly x 12 weeks; followed by D3 0152-2102 QD   - check vit D in 3 months

## 2025-01-17 NOTE — ASSESSMENT & PLAN NOTE
- controlled on trazadone  - replace trazadone with mirtazipine to aid with sleep and poor appetitte

## 2025-01-17 NOTE — PATIENT INSTRUCTIONS
I have ordered labs. Please try to complete your labs at least one week before your visit, so we can use the results to make sound treatment decisions.    Please search for \"How To Use Cologuard\" on Youtube for good instructions, if you plan to do the cologuard.    Please call this clinic for your constipation and possible colonoscopy  [facility]  [address] [place] [address], [address] [place] [address]  [phone] (p) [phone]

## 2025-01-17 NOTE — PROGRESS NOTES
Mayo Salinas is a 69 y.o. year old female who presents today for   Chief Complaint   Patient presents with    MUSCLE CRAMPS    Follow-up       \"Have you been to the ER, urgent care clinic since your last visit?  Hospitalized since your last visit?\"    no    “Have you seen or consulted any other health care providers outside our system since your last visit?”    no      “Have you had a colorectal cancer screening such as a colonoscopy/FIT/Cologuard?    NO -- has a kit, but due to constipation issues she's been unable to produce a sample.    Date of last Colonoscopy: 6/7/2013  No cologuard on file  No FIT/FOBT on file   No flexible sigmoidoscopy on file         Click Here for Release of Records Request    - Chloé Cullipher, LPN  [facility]  Phone: [phone]  Fax: [phone]

## 2025-01-17 NOTE — PROGRESS NOTES
Mayo Salinas (:  1955) is a 69 y.o. female here for evaluation of the following chief complaint(s):  MUSCLE CRAMPS and Follow-up        ASSESSMENT/PLAN:  1. Vitamin D deficiency  Assessment & Plan:  - vit D25 < 20  - D2 05864 IU weekly x 12 weeks; followed by D3 0685-6383 QD   - check vit D in 3 months   Orders:  -     vitamin D (ERGOCALCIFEROL) 1.25 MG (22840 UT) CAPS capsule; Take 1 capsule by mouth once a week, Disp-12 capsule, R-1Normal  -     Vitamin D 25 Hydroxy; Future  2. Hyperkalemia  Assessment & Plan:  - WNL in ED  - labs today to monitor   Orders:  -     Basic Metabolic Panel; Future  3. Chronic fatigue  Assessment & Plan:  - check B12  - her vit D is def, will treat  - discussed regular exercise   Orders:  -     Vitamin B12 & Folate; Future  4. Encounter for colorectal cancer screening  -     Cologuard (Fecal DNA Colorectal Cancer Screening)  5. Recurrent major depressive disorder, in full remission (HCC)  Assessment & Plan:  - replace trazadone with mirtazipine to aid with sleep and poor appetitte   Orders:  -     mirtazapine (REMERON) 15 MG tablet; Take 1 tablet by mouth nightly, Disp-90 tablet, R-1Normal  6. Primary insomnia  Assessment & Plan:  - controlled on trazadone  - replace trazadone with mirtazipine to aid with sleep and poor appetitte   Orders:  -     mirtazapine (REMERON) 15 MG tablet; Take 1 tablet by mouth nightly, Disp-90 tablet, R-1Normal  7. Muscle cramps  Assessment & Plan:  - check B12  - pharmacy did not give her vitamin E   Orders:  -     vitamin E 400 UNIT capsule; Take 2 capsules by mouth nightly, Disp-30 capsule, R-3Normal  8. Chronic pain syndrome  -     sennosides-docusate sodium (SENOKOT-S) 8.6-50 MG tablet; Take 1 tablet by mouth daily, Disp-90 tablet, R-3Normal      Follow-up and Dispositions    Return in about 2 weeks (around 2025) for virtual for appetite and AWV in 3 months.         SUBJECTIVE/OBJECTIVE:  HPI  Muscle Cramps  - toes, calves, L

## 2025-02-02 DIAGNOSIS — Z02.89 PAIN MANAGEMENT CONTRACT AGREEMENT: ICD-10-CM

## 2025-02-02 DIAGNOSIS — F11.20 OPIOID DEPENDENCE WITH CURRENT USE (HCC): ICD-10-CM

## 2025-02-03 NOTE — TELEPHONE ENCOUNTER
Medication(s) requesting:   Requested Prescriptions     Pending Prescriptions Disp Refills    linaclotide (LINZESS) 145 MCG capsule [Pharmacy Med Name: LINZESS 145MCG CAPSULES] 30 capsule 1     Sig: TAKE 1 CAPSULE BY MOUTH DAILY BEFORE BREAKFAST FOR CONSTIPATION        Last office visit:  1/17/25  Next office visit DMA: 2/14/2025

## 2025-02-11 DIAGNOSIS — M51.369 DDD (DEGENERATIVE DISC DISEASE), LUMBAR: ICD-10-CM

## 2025-02-11 DIAGNOSIS — Z02.89 PAIN MANAGEMENT CONTRACT AGREEMENT: ICD-10-CM

## 2025-02-11 DIAGNOSIS — G89.4 CHRONIC PAIN SYNDROME: ICD-10-CM

## 2025-02-11 DIAGNOSIS — F11.20 OPIOID DEPENDENCE WITH CURRENT USE (HCC): ICD-10-CM

## 2025-02-12 NOTE — TELEPHONE ENCOUNTER
Medication(s) requesting:   Requested Prescriptions     Pending Prescriptions Disp Refills    traMADol (ULTRAM) 50 MG tablet [Pharmacy Med Name: TRAMADOL 50MG TABLETS] 60 tablet      Sig: TAKE 1 TABLET BY MOUTH TWICE DAILY AS NEEDED FOR PAIN. MAX DAILY AMOUNT: 100 MG        Last office visit:  1/17/25  Next office visit DMA: 2/14/2025

## 2025-02-13 RX ORDER — TRAMADOL HYDROCHLORIDE 50 MG/1
50 TABLET ORAL 2 TIMES DAILY PRN
Qty: 60 TABLET | Refills: 0 | Status: SHIPPED | OUTPATIENT
Start: 2025-02-13 | End: 2025-03-15

## 2025-02-14 ENCOUNTER — OFFICE VISIT (OUTPATIENT)
Facility: CLINIC | Age: 70
End: 2025-02-14

## 2025-02-14 VITALS
SYSTOLIC BLOOD PRESSURE: 114 MMHG | RESPIRATION RATE: 15 BRPM | HEART RATE: 89 BPM | WEIGHT: 136.4 LBS | TEMPERATURE: 97.5 F | DIASTOLIC BLOOD PRESSURE: 79 MMHG | OXYGEN SATURATION: 89 % | BODY MASS INDEX: 24.17 KG/M2 | HEIGHT: 63 IN

## 2025-02-14 DIAGNOSIS — R25.2 MUSCLE CRAMPS: Primary | ICD-10-CM

## 2025-02-14 DIAGNOSIS — J06.9 VIRAL UPPER RESPIRATORY TRACT INFECTION: ICD-10-CM

## 2025-02-14 RX ORDER — VITAMIN E 268 MG
800 CAPSULE ORAL
Qty: 30 CAPSULE | Refills: 3 | Status: SHIPPED | OUTPATIENT
Start: 2025-02-14

## 2025-02-14 RX ORDER — LEVOCETIRIZINE DIHYDROCHLORIDE 5 MG/1
5 TABLET, FILM COATED ORAL NIGHTLY
Qty: 90 TABLET | Refills: 1 | Status: SHIPPED | OUTPATIENT
Start: 2025-02-14

## 2025-02-14 NOTE — PROGRESS NOTES
Mayo Salinas is a 69 y.o. year old female who presents today for   Chief Complaint   Patient presents with    Follow-up     Appetite?        \"Have you been to the ER, urgent care clinic since your last visit?  Hospitalized since your last visit?\"   no      “Have you seen or consulted any other health care providers outside our system since your last visit?”   NO       “Have you had a colorectal cancer screening such as a colonoscopy/FIT/Cologuard?    NO    Date of last Colonoscopy: 6/7/2013  No cologuard on file  No FIT/FOBT on file   No flexible sigmoidoscopy on file           Emy Sheehan CMA  [facility]  Ph: [phone]  Fax: [phone]

## 2025-02-14 NOTE — PROGRESS NOTES
Mayo Salinas (:  1955) is a 69 y.o. female here for evaluation of the following chief complaint(s):  Follow-up (Appetite?)        ASSESSMENT/PLAN:  1. Muscle cramps  Assessment & Plan:  - start vitamin E   - check in 2 months  Orders:  -     vitamin E 400 UNIT capsule; Take 2 capsules by mouth nightly, Disp-30 capsule, R-3Normal  2. Viral upper respiratory tract infection  - likely allergy vs URI; if persist will consider mycopplasma  -     pseudoephedrine (SUDAFED) 60 MG tablet; Take 1 tablet by mouth every 6 hours as needed for Congestion, Disp-50 tablet, R-1Normal  -     dextromethorphan-guaiFENesin  MG/5ML LIQD syrup; Take 5 mLs by mouth every 4 hours as needed (cough or chest congestion), Disp-473.18 mL, R-1Normal  -     levocetirizine (XYZAL) 5 MG tablet; Take 1 tablet by mouth nightly, Disp-90 tablet, R-1Normal          SUBJECTIVE/OBJECTIVE:  HPI  Chronic back pain/OA  - baclofen, mobic, tramadol, voltaren gel  - previously dx'd with \"degenerative bone disease\"; a cream was prescribed  Update (24)  - bilateral hip pain returned; has hx of OA and bursitis  - unclear what specific dx is \"degenerative bone disease\"; pt not sure if she has received a bone scan  Update (25)  - tramadola nd baclofen are effective; requesting refills ( already ordered)    Muscle Cramps  - toes, calves, L thigh, hands  Update (25)  - K 6.4 on ; pt went to ED, but she does not remember what happened; chart review of ED visit showed K 4.4  - phos 6.4  Update (25)  - pharmacy has not filled vitamin E    Cough  - started last night; dryness made her cough  - postussive emesis  - today throat is fine, still with dry cough    ROS as stated above.    /79 (Site: Right Upper Arm, Position: Sitting, Cuff Size: Large Adult)   Pulse 89   Temp 97.5 °F (36.4 °C) (Temporal)   Resp 15   Ht 1.6 m (5' 3\")   Wt 61.9 kg (136 lb 6.4 oz)   SpO2 (!) 89%   BMI 24.16 kg/m²     Physical

## 2025-02-25 DIAGNOSIS — M70.62 TROCHANTERIC BURSITIS OF LEFT HIP: ICD-10-CM

## 2025-02-25 DIAGNOSIS — M70.61 TROCHANTERIC BURSITIS OF RIGHT HIP: ICD-10-CM

## 2025-02-25 RX ORDER — MELOXICAM 15 MG/1
15 TABLET ORAL DAILY
Qty: 90 TABLET | Refills: 3
Start: 2025-02-25

## 2025-03-06 ENCOUNTER — HOSPITAL ENCOUNTER (OUTPATIENT)
Facility: HOSPITAL | Age: 70
Setting detail: SPECIMEN
Discharge: HOME | End: 2025-03-09
Payer: MEDICARE

## 2025-03-06 ENCOUNTER — OFFICE VISIT (OUTPATIENT)
Facility: CLINIC | Age: 70
End: 2025-03-06

## 2025-03-06 VITALS
TEMPERATURE: 97.9 F | OXYGEN SATURATION: 92 % | DIASTOLIC BLOOD PRESSURE: 80 MMHG | WEIGHT: 143.6 LBS | HEART RATE: 97 BPM | RESPIRATION RATE: 15 BRPM | HEIGHT: 63 IN | SYSTOLIC BLOOD PRESSURE: 115 MMHG | BODY MASS INDEX: 25.45 KG/M2

## 2025-03-06 DIAGNOSIS — J42 CHRONIC BRONCHITIS, UNSPECIFIED CHRONIC BRONCHITIS TYPE (HCC): ICD-10-CM

## 2025-03-06 DIAGNOSIS — J43.9 PULMONARY EMPHYSEMA, UNSPECIFIED EMPHYSEMA TYPE (HCC): ICD-10-CM

## 2025-03-06 DIAGNOSIS — I27.82 OTHER CHRONIC PULMONARY EMBOLISM WITHOUT ACUTE COR PULMONALE (HCC): ICD-10-CM

## 2025-03-06 DIAGNOSIS — Z09 HOSPITAL DISCHARGE FOLLOW-UP: Primary | ICD-10-CM

## 2025-03-06 DIAGNOSIS — E55.9 VITAMIN D DEFICIENCY: ICD-10-CM

## 2025-03-06 DIAGNOSIS — J45.40 MODERATE PERSISTENT ASTHMA WITHOUT COMPLICATION: ICD-10-CM

## 2025-03-06 PROBLEM — I26.99 OTHER PULMONARY EMBOLISM WITHOUT ACUTE COR PULMONALE (HCC): Status: ACTIVE | Noted: 2025-03-06

## 2025-03-06 LAB
25(OH)D3 SERPL-MCNC: 37.2 NG/ML (ref 30–100)
ANION GAP SERPL CALC-SCNC: 5 MMOL/L (ref 3–18)
BUN SERPL-MCNC: 15 MG/DL (ref 7–18)
BUN/CREAT SERPL: 18 (ref 12–20)
CALCIUM SERPL-MCNC: 8.7 MG/DL (ref 8.5–10.1)
CHLORIDE SERPL-SCNC: 105 MMOL/L (ref 100–111)
CO2 SERPL-SCNC: 30 MMOL/L (ref 21–32)
CREAT SERPL-MCNC: 0.83 MG/DL (ref 0.6–1.3)
ERYTHROCYTE [DISTWIDTH] IN BLOOD BY AUTOMATED COUNT: 15.4 % (ref 11.6–14.5)
GLUCOSE SERPL-MCNC: 72 MG/DL (ref 74–99)
HCT VFR BLD AUTO: 45.3 % (ref 35–45)
HGB BLD-MCNC: 14.1 G/DL (ref 12–16)
MCH RBC QN AUTO: 30.9 PG (ref 24–34)
MCHC RBC AUTO-ENTMCNC: 31.1 G/DL (ref 31–37)
MCV RBC AUTO: 99.3 FL (ref 78–100)
NRBC # BLD: 0.04 K/UL (ref 0–0.01)
NRBC BLD-RTO: 0.2 PER 100 WBC
PLATELET # BLD AUTO: 274 K/UL (ref 135–420)
PMV BLD AUTO: 9.6 FL (ref 9.2–11.8)
POTASSIUM SERPL-SCNC: 3.8 MMOL/L (ref 3.5–5.5)
RBC # BLD AUTO: 4.56 M/UL (ref 4.2–5.3)
SODIUM SERPL-SCNC: 140 MMOL/L (ref 136–145)
WBC # BLD AUTO: 16.8 K/UL (ref 4.6–13.2)

## 2025-03-06 PROCEDURE — 80048 BASIC METABOLIC PNL TOTAL CA: CPT

## 2025-03-06 PROCEDURE — 82306 VITAMIN D 25 HYDROXY: CPT

## 2025-03-06 PROCEDURE — 36415 COLL VENOUS BLD VENIPUNCTURE: CPT

## 2025-03-06 PROCEDURE — 85027 COMPLETE CBC AUTOMATED: CPT

## 2025-03-06 RX ORDER — ALBUTEROL SULFATE 0.63 MG/3ML
1 SOLUTION RESPIRATORY (INHALATION) EVERY 6 HOURS PRN
Qty: 270 ML | Refills: 5 | Status: SHIPPED | OUTPATIENT
Start: 2025-03-06

## 2025-03-06 RX ORDER — OMEPRAZOLE 20 MG/1
20 CAPSULE, DELAYED RELEASE ORAL DAILY
Qty: 30 CAPSULE | Refills: 1 | OUTPATIENT
Start: 2025-03-06

## 2025-03-06 NOTE — PATIENT INSTRUCTIONS
Please call medicare and ask about a ; they can help with facilitating your medical care.      Please call to schedule a cardiology appointment for your leg swelling  Address: [address], [place] [address]  Hours: Open ? Closes 4:30?PM  Phone: [phone]

## 2025-03-06 NOTE — PROGRESS NOTES
Post-Discharge Transitional Care  Follow Up      Mayo Salinas   YOB: 1955    Date of Office Visit:  3/6/2025  Date of Hospital Admission: 2/17/25  Date of Hospital Discharge: 2/19/25  Risk of hospital readmission (high >=14%. Medium >=10%) :No data recorded    Care management risk score Rising risk (score 2-5) and Complex Care (Scores >=6): No Risk Score On File     Non face to face  following discharge, date last encounter closed (first attempt may have been earlier): *No documented post hospital discharge outreach found in the last 14 days    Call initiated 2 business days of discharge: *No response recorded in the last 14 days    ASSESSMENT/PLAN:   Hospital discharge follow-up  -     PA DISCHARGE MEDS RECONCILED W/ CURRENT OUTPATIENT MED LIST  Chronic bronchitis, unspecified chronic bronchitis type (HCC)  -     CBC; Future  -     Basic Metabolic Panel; Future  Other chronic pulmonary embolism without acute cor pulmonale (HCC)  Assessment & Plan:  - first PE, but second DVT  - will need life long anticoagulation  - pt wants to see heme   Orders:  -     CBC; Future  -     Basic Metabolic Panel; Future  Pulmonary emphysema, unspecified emphysema type (HCC)  -     Budeson-Glycopyrrol-Formoterol 160-9-4.8 MCG/ACT AERO; Inhale 1 puff into the lungs daily, Disp-5.9 g, R-5Normal  -     External Referral To Hematology Oncology  -     apixaban (ELIQUIS) 5 MG TABS tablet; Take 1 tablet by mouth 2 times daily, Disp-180 tablet, R-1Normal  Moderate persistent asthma without complication  -     albuterol (ACCUNEB) 0.63 MG/3ML nebulizer solution; Take 3 mLs by nebulization every 6 hours as needed for Wheezing, Disp-270 mL, R-5Normal  Vitamin D deficiency  -     Vitamin D 25 Hydroxy; Future    Medical Decision Making: high complexity  Return for keep appoitnment.           Subjective:   HPI:  Follow up of Hospital problems/diagnosis(es): acute PE    Inpatient course: Discharge summary reviewed- see

## 2025-03-06 NOTE — TELEPHONE ENCOUNTER
Medication(s) requesting:   Requested Prescriptions     Pending Prescriptions Disp Refills    omeprazole (PRILOSEC) 20 MG delayed release capsule [Pharmacy Med Name: OMEPRAZOLE 20MG CAPSULES] 30 capsule 1     Sig: TAKE 1 CAPSULE BY MOUTH DAILY        Last office visit:  2/14/25  Next office visit DMA: 3/6/2025

## 2025-03-06 NOTE — PROGRESS NOTES
Mayo Salinas is a 69 y.o. year old female who presents today for   Chief Complaint   Patient presents with    Follow-Up from Hospital        \"Have you been to the ER, urgent care clinic since your last visit?  Hospitalized since your last visit?\"   YES Where:  [facility]   When: 2/17, 2/23, 3/3   Reason: LEG SWELLING,COUGH, SHORTNESS OF BREATH, CHEST PAIN     “Have you seen or consulted any other health care providers outside our system since your last visit?”   NO       “Have you had a colorectal cancer screening such as a colonoscopy/FIT/Cologuard?    NO    Date of last Colonoscopy: 6/7/2013  No cologuard on file  No FIT/FOBT on file   No flexible sigmoidoscopy on file           Emy Sheehan CMA  [facility]  Ph: [phone]  Fax: [phone]

## 2025-03-08 DIAGNOSIS — J44.9 CHRONIC OBSTRUCTIVE PULMONARY DISEASE, UNSPECIFIED COPD TYPE (HCC): ICD-10-CM

## 2025-03-09 DIAGNOSIS — Z02.89 PAIN MANAGEMENT CONTRACT AGREEMENT: ICD-10-CM

## 2025-03-09 DIAGNOSIS — G89.4 CHRONIC PAIN SYNDROME: ICD-10-CM

## 2025-03-09 DIAGNOSIS — M51.369 DDD (DEGENERATIVE DISC DISEASE), LUMBAR: ICD-10-CM

## 2025-03-09 DIAGNOSIS — F11.20 OPIOID DEPENDENCE WITH CURRENT USE (HCC): ICD-10-CM

## 2025-03-10 RX ORDER — ALBUTEROL SULFATE 90 UG/1
INHALANT RESPIRATORY (INHALATION)
Qty: 20.1 G | OUTPATIENT
Start: 2025-03-10

## 2025-03-10 RX ORDER — TRAMADOL HYDROCHLORIDE 50 MG/1
50 TABLET ORAL 2 TIMES DAILY PRN
Qty: 60 TABLET | Refills: 0 | Status: SHIPPED | OUTPATIENT
Start: 2025-03-10 | End: 2025-04-09

## 2025-03-10 NOTE — TELEPHONE ENCOUNTER
Medication(s) requesting:   Requested Prescriptions     Pending Prescriptions Disp Refills    traMADol (ULTRAM) 50 MG tablet [Pharmacy Med Name: TRAMADOL 50MG TABLETS] 60 tablet      Sig: TAKE 1 TABLET BY MOUTH TWICE DAILY AS NEEDED FOR PAIN. MAX DAILY AMOUNT: 100 MG        Last office visit:  3/6/25  Next office visit DMA: 4/8/2025

## 2025-03-12 ENCOUNTER — TELEPHONE (OUTPATIENT)
Facility: CLINIC | Age: 70
End: 2025-03-12

## 2025-03-12 DIAGNOSIS — J42 CHRONIC BRONCHITIS, UNSPECIFIED CHRONIC BRONCHITIS TYPE (HCC): Primary | ICD-10-CM

## 2025-03-13 ENCOUNTER — TELEPHONE (OUTPATIENT)
Facility: CLINIC | Age: 70
End: 2025-03-13

## 2025-03-13 NOTE — TELEPHONE ENCOUNTER
I reviewed notes and discharge summary. Pt was prescribed a course of steroids from ED for COPD exacerbation.  I called pt and instructed her to complete the steroids as instructed on the box.    I have spent 12 minutes on chart review, pt communication, medication reconciliation, and insurance requirement review.

## 2025-03-13 NOTE — TELEPHONE ENCOUNTER
Patient called in stating she completed the steroids as instructed from Emergency Department. She will like to know will she be prescribed another box from her PCP. Please be advised, thank you.

## 2025-03-13 NOTE — TELEPHONE ENCOUNTER
No, the steroid treatment is short term.  If she is having new breathing symptoms, she should be seen in clinic.

## 2025-03-25 DIAGNOSIS — M70.62 TROCHANTERIC BURSITIS OF LEFT HIP: ICD-10-CM

## 2025-03-25 DIAGNOSIS — M70.61 TROCHANTERIC BURSITIS OF RIGHT HIP: ICD-10-CM

## 2025-03-25 NOTE — TELEPHONE ENCOUNTER
Last Appointment:  11/13/2023  Future Appointments   Date Time Provider Department Center   4/8/2025 11:15 AM Bradly Ramsay MD [facility] DE[facility]   4/21/2025 10:15 AM Bradly Ramsay MD [facility] DE[facility]

## 2025-03-28 ENCOUNTER — TELEPHONE (OUTPATIENT)
Facility: CLINIC | Age: 70
End: 2025-03-28

## 2025-03-28 DIAGNOSIS — I27.82 OTHER CHRONIC PULMONARY EMBOLISM WITHOUT ACUTE COR PULMONALE: ICD-10-CM

## 2025-03-28 NOTE — TELEPHONE ENCOUNTER
Medication(s) requesting:   Requested Prescriptions     Pending Prescriptions Disp Refills    apixaban (ELIQUIS) 5 MG TABS tablet 180 tablet 1     Sig: Take 1 tablet by mouth 2 times daily        Last office visit:  3/6/25  Next office visit DMA: 4/8/2025

## 2025-03-28 NOTE — TELEPHONE ENCOUNTER
Pt says a PA is needed for Eliquis per the pt's pharmacy. Pt stated she spoke with them and they advised her they needed authorization from the doctor. Pt also would like to request a refill for Omeprazole.     Please advise

## 2025-03-28 NOTE — TELEPHONE ENCOUNTER
Pt called back in to advise the pharmacy needs a refill sent over for Eliquis, no refills remain    LOV: 3.3.25    NOV: 4.8.25

## 2025-04-06 DIAGNOSIS — F11.20 OPIOID DEPENDENCE WITH CURRENT USE (HCC): ICD-10-CM

## 2025-04-06 DIAGNOSIS — Z02.89 PAIN MANAGEMENT CONTRACT AGREEMENT: ICD-10-CM

## 2025-04-07 NOTE — TELEPHONE ENCOUNTER
Medication(s) requesting:   Requested Prescriptions     Pending Prescriptions Disp Refills    linaclotide (LINZESS) 145 MCG capsule [Pharmacy Med Name: LINZESS 145MCG CAPSULES] 30 capsule 1     Sig: TAKE 1 CAPSULE BY MOUTH DAILY BEFORE BREAKFAST FOR CONSTIPATION       Last office visit:  3.6.25  Next office visit DMA: 4/8/2025

## 2025-04-08 ENCOUNTER — HOSPITAL ENCOUNTER (OUTPATIENT)
Facility: HOSPITAL | Age: 70
Setting detail: SPECIMEN
Discharge: HOME | End: 2025-04-11
Payer: MEDICARE

## 2025-04-08 ENCOUNTER — OFFICE VISIT (OUTPATIENT)
Facility: CLINIC | Age: 70
End: 2025-04-08
Payer: MEDICARE

## 2025-04-08 VITALS
TEMPERATURE: 97.8 F | HEIGHT: 63 IN | SYSTOLIC BLOOD PRESSURE: 124 MMHG | OXYGEN SATURATION: 88 % | RESPIRATION RATE: 16 BRPM | HEART RATE: 89 BPM | WEIGHT: 151.6 LBS | DIASTOLIC BLOOD PRESSURE: 82 MMHG | BODY MASS INDEX: 26.86 KG/M2

## 2025-04-08 DIAGNOSIS — G89.4 CHRONIC PAIN SYNDROME: ICD-10-CM

## 2025-04-08 DIAGNOSIS — I10 ESSENTIAL (PRIMARY) HYPERTENSION: ICD-10-CM

## 2025-04-08 DIAGNOSIS — M51.362 DEGENERATION OF INTERVERTEBRAL DISC OF LUMBAR REGION WITH DISCOGENIC BACK PAIN AND LOWER EXTREMITY PAIN: ICD-10-CM

## 2025-04-08 DIAGNOSIS — Z02.89 PAIN MANAGEMENT CONTRACT AGREEMENT: ICD-10-CM

## 2025-04-08 DIAGNOSIS — J43.9 PULMONARY EMPHYSEMA, UNSPECIFIED EMPHYSEMA TYPE (HCC): ICD-10-CM

## 2025-04-08 DIAGNOSIS — E78.5 DYSLIPIDEMIA: ICD-10-CM

## 2025-04-08 DIAGNOSIS — Z12.11 SCREENING FOR MALIGNANT NEOPLASM OF COLON: Primary | ICD-10-CM

## 2025-04-08 DIAGNOSIS — M70.62 TROCHANTERIC BURSITIS OF LEFT HIP: ICD-10-CM

## 2025-04-08 DIAGNOSIS — F11.20 OPIOID DEPENDENCE WITH CURRENT USE (HCC): ICD-10-CM

## 2025-04-08 DIAGNOSIS — M70.61 TROCHANTERIC BURSITIS OF RIGHT HIP: ICD-10-CM

## 2025-04-08 LAB
CHOLEST SERPL-MCNC: 193 MG/DL
HDLC SERPL-MCNC: 106 MG/DL (ref 40–60)
HDLC SERPL: 1.8 (ref 0–5)
LDLC SERPL CALC-MCNC: 71.2 MG/DL (ref 0–100)
LIPID PANEL: ABNORMAL
TRIGL SERPL-MCNC: 79 MG/DL
VLDLC SERPL CALC-MCNC: 15.8 MG/DL

## 2025-04-08 PROCEDURE — 36415 COLL VENOUS BLD VENIPUNCTURE: CPT

## 2025-04-08 PROCEDURE — G2211 COMPLEX E/M VISIT ADD ON: HCPCS | Performed by: STUDENT IN AN ORGANIZED HEALTH CARE EDUCATION/TRAINING PROGRAM

## 2025-04-08 PROCEDURE — 3079F DIAST BP 80-89 MM HG: CPT | Performed by: STUDENT IN AN ORGANIZED HEALTH CARE EDUCATION/TRAINING PROGRAM

## 2025-04-08 PROCEDURE — 99214 OFFICE O/P EST MOD 30 MIN: CPT | Performed by: STUDENT IN AN ORGANIZED HEALTH CARE EDUCATION/TRAINING PROGRAM

## 2025-04-08 PROCEDURE — 1126F AMNT PAIN NOTED NONE PRSNT: CPT | Performed by: STUDENT IN AN ORGANIZED HEALTH CARE EDUCATION/TRAINING PROGRAM

## 2025-04-08 PROCEDURE — 1159F MED LIST DOCD IN RCRD: CPT | Performed by: STUDENT IN AN ORGANIZED HEALTH CARE EDUCATION/TRAINING PROGRAM

## 2025-04-08 PROCEDURE — 1123F ACP DISCUSS/DSCN MKR DOCD: CPT | Performed by: STUDENT IN AN ORGANIZED HEALTH CARE EDUCATION/TRAINING PROGRAM

## 2025-04-08 PROCEDURE — 80061 LIPID PANEL: CPT

## 2025-04-08 PROCEDURE — 3074F SYST BP LT 130 MM HG: CPT | Performed by: STUDENT IN AN ORGANIZED HEALTH CARE EDUCATION/TRAINING PROGRAM

## 2025-04-08 RX ORDER — BACLOFEN 10 MG/1
10 TABLET ORAL 2 TIMES DAILY
Qty: 180 TABLET | Refills: 1 | Status: SHIPPED | OUTPATIENT
Start: 2025-04-08

## 2025-04-08 RX ORDER — TRAMADOL HYDROCHLORIDE 50 MG/1
50 TABLET ORAL 2 TIMES DAILY PRN
Qty: 60 TABLET | Refills: 0 | Status: SHIPPED | OUTPATIENT
Start: 2025-04-08 | End: 2025-05-08

## 2025-04-08 RX ORDER — AMLODIPINE BESYLATE 5 MG/1
5 TABLET ORAL DAILY
Qty: 90 TABLET | Refills: 1 | Status: SHIPPED | OUTPATIENT
Start: 2025-04-08

## 2025-04-08 NOTE — PROGRESS NOTES
Mayo Salinas (:  1955) is a 69 y.o. female here for evaluation of the following chief complaint(s):  Discuss Labs        ASSESSMENT/PLAN:  1. Screening for malignant neoplasm of colon  -     Cologuard (Fecal DNA Colorectal Cancer Screening)  2. Chronic pain syndrome  Assessment & Plan:  - no changes; stable on tramadol, baclofen, and voltaren gel   -  reviewed, no concerns      Orders:  -     baclofen (LIORESAL) 10 MG tablet; Take 1 tablet by mouth 2 times daily, Disp-180 tablet, R-1Normal  -     traMADol (ULTRAM) 50 MG tablet; Take 1 tablet by mouth 2 times daily as needed for Pain for up to 30 days. Max Daily Amount: 100 mg, Disp-60 tablet, R-0Normal  3. Trochanteric bursitis of left hip  -     diclofenac sodium (VOLTAREN) 1 % GEL; Apply topically 4 times daily as needed for Pain, Topical, 4 TIMES DAILY PRN Starting 2025, Disp-500 g, R-1, Normal  4. Trochanteric bursitis of right hip  -     diclofenac sodium (VOLTAREN) 1 % GEL; Apply topically 4 times daily as needed for Pain, Topical, 4 TIMES DAILY PRN Starting 2025, Disp-500 g, R-1, Normal  5. Pulmonary emphysema, unspecified emphysema type (HCC)  Assessment & Plan:  - controlled, no adverse medication SE, continue meds     Orders:  -     Budeson-Glycopyrrol-Formoterol 160-9-4.8 MCG/ACT AERO; Inhale 1 puff into the lungs daily, Disp-5.9 g, R-1Normal  6. Essential (primary) hypertension  Assessment & Plan:  - controlled, no adverse medication SE, continue meds     Orders:  -     amLODIPine (NORVASC) 5 MG tablet; Take 1 tablet by mouth daily, Disp-90 tablet, R-1Normal  7. DDD (degenerative disc disease), lumbar  -     traMADol (ULTRAM) 50 MG tablet; Take 1 tablet by mouth 2 times daily as needed for Pain for up to 30 days. Max Daily Amount: 100 mg, Disp-60 tablet, R-0Normal  8. Opioid dependence with current use (HCC)  -     traMADol (ULTRAM) 50 MG tablet; Take 1 tablet by mouth 2 times daily as needed for Pain for up to 30

## 2025-04-08 NOTE — PROGRESS NOTES
Mayo Salinas is a 69 y.o. year old female who presents today for   Chief Complaint   Patient presents with    Discuss Labs        \"Have you been to the ER, urgent care clinic since your last visit?  Hospitalized since your last visit?\"   NO     “Have you seen or consulted any other health care providers outside our system since your last visit?”   NO       “Have you had a colorectal cancer screening such as a colonoscopy/FIT/Cologuard?    NO    Date of last Colonoscopy: 6/7/2013  No cologuard on file  No FIT/FOBT on file   No flexible sigmoidoscopy on file           Emy Sheehan CMA  [facility]  Ph: [phone]  Fax: [phone]

## 2025-04-10 NOTE — TELEPHONE ENCOUNTER
Medication(s) requesting:   Requested Prescriptions     Pending Prescriptions Disp Refills    simvastatin (ZOCOR) 10 MG tablet [Pharmacy Med Name: SIMVASTATIN 10MG TABLETS] 90 tablet 0     Sig: TAKE 1 TABLET BY MOUTH EVERY NIGHT        Last office visit:  4/8/25  Next office visit DMA: 4/21/2025

## 2025-04-10 NOTE — ASSESSMENT & PLAN NOTE
- controlled, no adverse medication SE, continue meds      Attempted to call patient again to see if message from David Taylor received  Message box still full

## 2025-04-11 RX ORDER — SIMVASTATIN 10 MG
10 TABLET ORAL NIGHTLY
Qty: 90 TABLET | Refills: 0 | Status: SHIPPED | OUTPATIENT
Start: 2025-04-11

## 2025-04-18 ENCOUNTER — TELEPHONE (OUTPATIENT)
Facility: CLINIC | Age: 70
End: 2025-04-18

## 2025-04-18 DIAGNOSIS — J43.9 PULMONARY EMPHYSEMA, UNSPECIFIED EMPHYSEMA TYPE (HCC): ICD-10-CM

## 2025-04-18 NOTE — TELEPHONE ENCOUNTER
[facility] to [facility] needs [facility]CP to resubmit the script, as the one sent in is too small for the pt and the pt needs the bigger one, she believes it's 10.5    One sent in recently: Budeson-Glycopyrrol-Formoterol 160-9-4.8 MCG/ACT AERO [[id number]]

## 2025-05-01 ENCOUNTER — OFFICE VISIT (OUTPATIENT)
Facility: CLINIC | Age: 70
End: 2025-05-01
Payer: MEDICARE

## 2025-05-01 VITALS
RESPIRATION RATE: 12 BRPM | TEMPERATURE: 97.3 F | OXYGEN SATURATION: 90 % | HEIGHT: 63 IN | SYSTOLIC BLOOD PRESSURE: 131 MMHG | DIASTOLIC BLOOD PRESSURE: 85 MMHG | HEART RATE: 85 BPM | WEIGHT: 154 LBS | BODY MASS INDEX: 27.29 KG/M2

## 2025-05-01 DIAGNOSIS — M70.61 TROCHANTERIC BURSITIS OF RIGHT HIP: ICD-10-CM

## 2025-05-01 DIAGNOSIS — M70.62 TROCHANTERIC BURSITIS OF LEFT HIP: ICD-10-CM

## 2025-05-01 DIAGNOSIS — F17.200 SMOKER: ICD-10-CM

## 2025-05-01 DIAGNOSIS — R60.9 WEIGHT GAIN WITH EDEMA: ICD-10-CM

## 2025-05-01 DIAGNOSIS — J42 CHRONIC BRONCHITIS, UNSPECIFIED CHRONIC BRONCHITIS TYPE (HCC): ICD-10-CM

## 2025-05-01 DIAGNOSIS — R06.09 DYSPNEA ON EXERTION: ICD-10-CM

## 2025-05-01 DIAGNOSIS — Z12.11 SCREENING FOR MALIGNANT NEOPLASM OF COLON: ICD-10-CM

## 2025-05-01 DIAGNOSIS — G89.4 CHRONIC PAIN SYNDROME: ICD-10-CM

## 2025-05-01 DIAGNOSIS — F11.20 OPIOID DEPENDENCE WITH CURRENT USE (HCC): ICD-10-CM

## 2025-05-01 DIAGNOSIS — I27.82 OTHER CHRONIC PULMONARY EMBOLISM WITHOUT ACUTE COR PULMONALE (HCC): ICD-10-CM

## 2025-05-01 DIAGNOSIS — Z00.00 MEDICARE ANNUAL WELLNESS VISIT, SUBSEQUENT: Primary | ICD-10-CM

## 2025-05-01 DIAGNOSIS — R63.5 WEIGHT GAIN WITH EDEMA: ICD-10-CM

## 2025-05-01 DIAGNOSIS — Z02.89 PAIN MANAGEMENT CONTRACT AGREEMENT: ICD-10-CM

## 2025-05-01 DIAGNOSIS — M51.362 DEGENERATION OF INTERVERTEBRAL DISC OF LUMBAR REGION WITH DISCOGENIC BACK PAIN AND LOWER EXTREMITY PAIN: ICD-10-CM

## 2025-05-01 DIAGNOSIS — J43.9 PULMONARY EMPHYSEMA, UNSPECIFIED EMPHYSEMA TYPE (HCC): ICD-10-CM

## 2025-05-01 PROCEDURE — G2211 COMPLEX E/M VISIT ADD ON: HCPCS | Performed by: STUDENT IN AN ORGANIZED HEALTH CARE EDUCATION/TRAINING PROGRAM

## 2025-05-01 PROCEDURE — 99214 OFFICE O/P EST MOD 30 MIN: CPT | Performed by: STUDENT IN AN ORGANIZED HEALTH CARE EDUCATION/TRAINING PROGRAM

## 2025-05-01 PROCEDURE — 3075F SYST BP GE 130 - 139MM HG: CPT | Performed by: STUDENT IN AN ORGANIZED HEALTH CARE EDUCATION/TRAINING PROGRAM

## 2025-05-01 PROCEDURE — 99407 BEHAV CHNG SMOKING > 10 MIN: CPT | Performed by: STUDENT IN AN ORGANIZED HEALTH CARE EDUCATION/TRAINING PROGRAM

## 2025-05-01 PROCEDURE — 1160F RVW MEDS BY RX/DR IN RCRD: CPT | Performed by: STUDENT IN AN ORGANIZED HEALTH CARE EDUCATION/TRAINING PROGRAM

## 2025-05-01 PROCEDURE — G0439 PPPS, SUBSEQ VISIT: HCPCS | Performed by: STUDENT IN AN ORGANIZED HEALTH CARE EDUCATION/TRAINING PROGRAM

## 2025-05-01 PROCEDURE — 1123F ACP DISCUSS/DSCN MKR DOCD: CPT | Performed by: STUDENT IN AN ORGANIZED HEALTH CARE EDUCATION/TRAINING PROGRAM

## 2025-05-01 PROCEDURE — 1126F AMNT PAIN NOTED NONE PRSNT: CPT | Performed by: STUDENT IN AN ORGANIZED HEALTH CARE EDUCATION/TRAINING PROGRAM

## 2025-05-01 PROCEDURE — 1159F MED LIST DOCD IN RCRD: CPT | Performed by: STUDENT IN AN ORGANIZED HEALTH CARE EDUCATION/TRAINING PROGRAM

## 2025-05-01 PROCEDURE — 3079F DIAST BP 80-89 MM HG: CPT | Performed by: STUDENT IN AN ORGANIZED HEALTH CARE EDUCATION/TRAINING PROGRAM

## 2025-05-01 RX ORDER — LIDOCAINE 50 MG/G
1 PATCH TOPICAL DAILY
Qty: 90 PATCH | Refills: 1 | Status: SHIPPED | OUTPATIENT
Start: 2025-05-01

## 2025-05-01 RX ORDER — MONTELUKAST SODIUM 5 MG/1
5 TABLET, CHEWABLE ORAL EVERY EVENING
Qty: 90 TABLET | Refills: 0 | Status: SHIPPED | OUTPATIENT
Start: 2025-05-01

## 2025-05-01 RX ORDER — TRAMADOL HYDROCHLORIDE 50 MG/1
50 TABLET ORAL 2 TIMES DAILY PRN
Qty: 60 TABLET | Refills: 0 | Status: SHIPPED | OUTPATIENT
Start: 2025-05-01 | End: 2025-05-31

## 2025-05-01 ASSESSMENT — PATIENT HEALTH QUESTIONNAIRE - PHQ9
8. MOVING OR SPEAKING SO SLOWLY THAT OTHER PEOPLE COULD HAVE NOTICED. OR THE OPPOSITE, BEING SO FIGETY OR RESTLESS THAT YOU HAVE BEEN MOVING AROUND A LOT MORE THAN USUAL: NOT AT ALL
SUM OF ALL RESPONSES TO PHQ QUESTIONS 1-9: 6
10. IF YOU CHECKED OFF ANY PROBLEMS, HOW DIFFICULT HAVE THESE PROBLEMS MADE IT FOR YOU TO DO YOUR WORK, TAKE CARE OF THINGS AT HOME, OR GET ALONG WITH OTHER PEOPLE: NOT DIFFICULT AT ALL
SUM OF ALL RESPONSES TO PHQ QUESTIONS 1-9: 6
2. FEELING DOWN, DEPRESSED OR HOPELESS: NOT AT ALL
4. FEELING TIRED OR HAVING LITTLE ENERGY: NEARLY EVERY DAY
7. TROUBLE CONCENTRATING ON THINGS, SUCH AS READING THE NEWSPAPER OR WATCHING TELEVISION: NOT AT ALL
SUM OF ALL RESPONSES TO PHQ QUESTIONS 1-9: 6
1. LITTLE INTEREST OR PLEASURE IN DOING THINGS: NEARLY EVERY DAY
5. POOR APPETITE OR OVEREATING: NOT AT ALL
3. TROUBLE FALLING OR STAYING ASLEEP: NOT AT ALL
6. FEELING BAD ABOUT YOURSELF - OR THAT YOU ARE A FAILURE OR HAVE LET YOURSELF OR YOUR FAMILY DOWN: NOT AT ALL
9. THOUGHTS THAT YOU WOULD BE BETTER OFF DEAD, OR OF HURTING YOURSELF: NOT AT ALL
SUM OF ALL RESPONSES TO PHQ QUESTIONS 1-9: 6

## 2025-05-01 ASSESSMENT — LIFESTYLE VARIABLES
HOW MANY STANDARD DRINKS CONTAINING ALCOHOL DO YOU HAVE ON A TYPICAL DAY: PATIENT DOES NOT DRINK
HOW OFTEN DO YOU HAVE A DRINK CONTAINING ALCOHOL: NEVER

## 2025-05-01 NOTE — PROGRESS NOTES
Mayo Salinas (:  1955) is a 69 y.o. female here for evaluation of the following chief complaint(s):  Medicare AWV        ASSESSMENT/PLAN:  1. Medicare annual wellness visit, subsequent  2. Smoker  -     External Referral To Pulmonology  3. Trochanteric bursitis of left hip  Assessment & Plan:  - pain controlled on lidoderm, tramadol, voltaren gel    Orders:  -     diclofenac sodium (VOLTAREN) 1 % GEL; Apply topically 4 times daily as needed for Pain, Topical, 4 TIMES DAILY PRN Starting Thu 2025, Disp-500 g, R-1, Normal  -     lidocaine (LIDODERM) 5 %; Place 1 patch onto the skin daily Remove after 12 hours, Disp-90 patch, R-1Normal  4. Trochanteric bursitis of right hip  Assessment & Plan:  - pain controlled on lidoderm, tramadol, voltaren gel   Orders:  -     diclofenac sodium (VOLTAREN) 1 % GEL; Apply topically 4 times daily as needed for Pain, Topical, 4 TIMES DAILY PRN Starting Thu 2025, Disp-500 g, R-1, Normal  -     lidocaine (LIDODERM) 5 %; Place 1 patch onto the skin daily Remove after 12 hours, Disp-90 patch, R-1Normal  5. Weight gain with edema  -     Brain Natriuretic Peptide; Future  -     Comprehensive Metabolic Panel; Future  6. Other chronic pulmonary embolism without acute cor pulmonale (HCC)  Assessment & Plan:  - needs to see pulm for hx of COPD and PE   Orders:  -     External Referral To Pulmonology  -     montelukast (SINGULAIR) 5 MG chewable tablet; Take 1 tablet by mouth every evening, Disp-90 tablet, R-0Normal  7. Chronic bronchitis, unspecified chronic bronchitis type (HCC)  Assessment & Plan:  - less controlled  - needs breztri BID; add singulair for increased allergens   Orders:  -     External Referral To Pulmonology  -     montelukast (SINGULAIR) 5 MG chewable tablet; Take 1 tablet by mouth every evening, Disp-90 tablet, R-0Normal  8. Dyspnea on exertion  -     Brain Natriuretic Peptide; Future  9. Pulmonary emphysema, unspecified emphysema type

## 2025-05-01 NOTE — PROGRESS NOTES
Mayo Salinas is a 69 y.o. year old female who presents today for   Chief Complaint   Patient presents with    Medicare AWV        \"Have you been to the ER, urgent care clinic since your last visit?  Hospitalized since your last visit?\"   NO      “Have you seen or consulted any other health care providers outside our system since your last visit?”   NO       “Have you had a colorectal cancer screening such as a colonoscopy/FIT/Cologuard?    NO    Date of last Colonoscopy: 6/7/2013  No cologuard on file  No FIT/FOBT on file   No flexible sigmoidoscopy on file           Emy Sheehan CMA  [facility]  Ph: [phone]  Fax: [phone]

## 2025-05-01 NOTE — PROGRESS NOTES
Medicare Annual Wellness Visit    Mayo Salinas is here for Medicare AWV    Assessment & Plan   Medicare annual wellness visit, subsequent     Return in 3 months (on 8/1/2025).     Subjective       Patient's complete Health Risk Assessment and screening values have been reviewed and are found in Flowsheets. The following problems were reviewed today and where indicated follow up appointments were made and/or referrals ordered.    Positive Risk Factor Screenings with Interventions:      Depression:  PHQ-2 Score: 3  PHQ-9 Total Score: 6  Total Score Interpretation: 5-9 = mild depression  Interventions:  Patient comments: \"my mood be ok\"      Controlled Medication Review:      Today's Pain Level: Pain Score: Zero       Opioid Risk: (High risk score >=55) Opioid risk score: 71      Last PDMP Gianni as Reviewed:  Review User Review Instant Review Result   JUAN DIEGO KINGSTON 3/10/2025  8:47 AM @   Reviewed PDMP [1]     Last Controlled Substance Monitoring Documentation      Flowsheet Row Office Visit from 5/1/2025 in [facility]   Periodic Controlled Substance Monitoring No signs of potential drug abuse or diversion identified., Obtaining appropriate analgesic effect of treatment. filed at 05/01/2025 0916   All MEDD Reviewed of previous treatment and response to treatment, patients adherence to medication and non-medication treatment filed at 05/01/2025 0916                Inactivity:  On average, how many days per week do you engage in moderate to strenuous exercise (like a brisk walk)?: 0 days (!) Abnormal  On average, how many minutes do you engage in exercise at this level?: 0 min  Interventions:  Patient comments: plans on being more active and joining a gym      Dentist Screen:  Have you seen the dentist within the past year?: (!) No    Intervention:  Patient comments: plans to schedule with her dentist     Vision Screen:  Do you have difficulty driving, watching TV, or doing any of your daily

## 2025-05-07 DIAGNOSIS — J44.9 CHRONIC OBSTRUCTIVE PULMONARY DISEASE, UNSPECIFIED COPD TYPE (HCC): ICD-10-CM

## 2025-05-07 DIAGNOSIS — I10 ESSENTIAL (PRIMARY) HYPERTENSION: ICD-10-CM

## 2025-05-07 RX ORDER — ALBUTEROL SULFATE 90 UG/1
INHALANT RESPIRATORY (INHALATION)
Qty: 54 G | Refills: 11 | OUTPATIENT
Start: 2025-05-07

## 2025-05-07 RX ORDER — AMLODIPINE BESYLATE 5 MG/1
TABLET ORAL
Qty: 90 TABLET | Refills: 11 | OUTPATIENT
Start: 2025-05-07

## 2025-05-07 NOTE — TELEPHONE ENCOUNTER
Medication(s) requesting:   Requested Prescriptions     Pending Prescriptions Disp Refills    albuterol sulfate HFA (PROVENTIL;VENTOLIN;PROAIR) 108 (90 Base) MCG/ACT inhaler [Pharmacy Med Name: ALBUTEROL HFA*VENT* 90MCG 108 (90 BAS Aerosol] 54 g 11     Sig: INHALE TWO (2) PUFFS BY MOUTH EVERY 6 HOURS AS NEEDED *NEW PRESCRIPTION REQUEST*    amLODIPine (NORVASC) 5 MG tablet [Pharmacy Med Name: AMLODIPINE 5MG TAB 5 Tablet] 90 tablet 11     Sig: TAKE 1 TABLET BY MOUTH EVERY DAY *NEW PRESCRIPTION REQUEST*        Last office visit:  5/1/25  Next office visit DMA: 8/1/2025

## 2025-05-08 DIAGNOSIS — Z02.89 PAIN MANAGEMENT CONTRACT AGREEMENT: ICD-10-CM

## 2025-05-08 DIAGNOSIS — F11.20 OPIOID DEPENDENCE WITH CURRENT USE (HCC): ICD-10-CM

## 2025-05-08 NOTE — TELEPHONE ENCOUNTER
Medication(s) requesting:   Requested Prescriptions     Pending Prescriptions Disp Refills    linaclotide (LINZESS) 145 MCG capsule [Pharmacy Med Name: LINZESS 145MCG CAP 30CT 145 Capsule] 30 capsule 11     Sig: TAKE 1 CAPSULE BY MOUTH DAILY BEFORE BREAKFAST FOR CONSTIPATION        Last office visit:  5/1/25  Next office visit DMA: 8/1/2025

## 2025-05-09 DIAGNOSIS — Z02.89 PAIN MANAGEMENT CONTRACT AGREEMENT: ICD-10-CM

## 2025-05-09 DIAGNOSIS — F33.42 RECURRENT MAJOR DEPRESSIVE DISORDER, IN FULL REMISSION: ICD-10-CM

## 2025-05-09 DIAGNOSIS — F11.20 OPIOID DEPENDENCE WITH CURRENT USE (HCC): ICD-10-CM

## 2025-05-09 DIAGNOSIS — J06.9 VIRAL UPPER RESPIRATORY TRACT INFECTION: ICD-10-CM

## 2025-05-09 DIAGNOSIS — M70.61 TROCHANTERIC BURSITIS OF RIGHT HIP: ICD-10-CM

## 2025-05-09 DIAGNOSIS — F51.01 PRIMARY INSOMNIA: ICD-10-CM

## 2025-05-09 DIAGNOSIS — G89.4 CHRONIC PAIN SYNDROME: ICD-10-CM

## 2025-05-09 DIAGNOSIS — I27.82 OTHER CHRONIC PULMONARY EMBOLISM WITHOUT ACUTE COR PULMONALE (HCC): ICD-10-CM

## 2025-05-09 DIAGNOSIS — J43.9 PULMONARY EMPHYSEMA, UNSPECIFIED EMPHYSEMA TYPE (HCC): ICD-10-CM

## 2025-05-09 DIAGNOSIS — M51.362 DEGENERATION OF INTERVERTEBRAL DISC OF LUMBAR REGION WITH DISCOGENIC BACK PAIN AND LOWER EXTREMITY PAIN: ICD-10-CM

## 2025-05-09 DIAGNOSIS — J45.40 MODERATE PERSISTENT ASTHMA WITHOUT COMPLICATION: ICD-10-CM

## 2025-05-09 DIAGNOSIS — M70.62 TROCHANTERIC BURSITIS OF LEFT HIP: ICD-10-CM

## 2025-05-09 DIAGNOSIS — J44.9 CHRONIC OBSTRUCTIVE PULMONARY DISEASE, UNSPECIFIED COPD TYPE (HCC): ICD-10-CM

## 2025-05-09 DIAGNOSIS — I10 ESSENTIAL (PRIMARY) HYPERTENSION: ICD-10-CM

## 2025-05-09 DIAGNOSIS — J42 CHRONIC BRONCHITIS, UNSPECIFIED CHRONIC BRONCHITIS TYPE (HCC): ICD-10-CM

## 2025-05-09 LAB — NONINV COLON CA DNA+OCC BLD SCRN STL QL: POSITIVE

## 2025-05-09 RX ORDER — TRAMADOL HYDROCHLORIDE 50 MG/1
50 TABLET ORAL 2 TIMES DAILY PRN
Qty: 60 TABLET | Refills: 0 | OUTPATIENT
Start: 2025-05-09 | End: 2025-06-08

## 2025-05-09 RX ORDER — MONTELUKAST SODIUM 5 MG/1
5 TABLET, CHEWABLE ORAL EVERY EVENING
Qty: 90 TABLET | Refills: 0 | OUTPATIENT
Start: 2025-05-09

## 2025-05-09 RX ORDER — SIMVASTATIN 10 MG
10 TABLET ORAL NIGHTLY
Qty: 90 TABLET | Refills: 0 | OUTPATIENT
Start: 2025-05-09

## 2025-05-09 RX ORDER — ALBUTEROL SULFATE 90 UG/1
INHALANT RESPIRATORY (INHALATION)
Qty: 54 G | Refills: 1 | OUTPATIENT
Start: 2025-05-09

## 2025-05-09 RX ORDER — ALBUTEROL SULFATE 0.63 MG/3ML
1 SOLUTION RESPIRATORY (INHALATION) EVERY 6 HOURS PRN
Qty: 270 ML | Refills: 5 | OUTPATIENT
Start: 2025-05-09

## 2025-05-09 RX ORDER — LIDOCAINE 50 MG/G
1 PATCH TOPICAL DAILY
Qty: 90 PATCH | Refills: 1 | OUTPATIENT
Start: 2025-05-09

## 2025-05-09 RX ORDER — LEVOCETIRIZINE DIHYDROCHLORIDE 5 MG/1
5 TABLET, FILM COATED ORAL NIGHTLY
Qty: 90 TABLET | Refills: 1 | OUTPATIENT
Start: 2025-05-09

## 2025-05-09 RX ORDER — MELOXICAM 15 MG/1
15 TABLET ORAL DAILY
Qty: 90 TABLET | Refills: 3 | OUTPATIENT
Start: 2025-05-09

## 2025-05-09 RX ORDER — AMLODIPINE BESYLATE 5 MG/1
5 TABLET ORAL DAILY
Qty: 90 TABLET | Refills: 1 | OUTPATIENT
Start: 2025-05-09

## 2025-05-09 RX ORDER — MIRTAZAPINE 15 MG/1
15 TABLET, FILM COATED ORAL NIGHTLY
Qty: 90 TABLET | Refills: 1 | OUTPATIENT
Start: 2025-05-09

## 2025-05-09 NOTE — TELEPHONE ENCOUNTER
pharmacy medication refill request  received    albuterol sulfate HFA (PROVENTIL;VENTOLIN;PROAIR) 108 (90 Base) MCG/ACT inhaler     famotidine 20 mg   albuterol (ACCUNEB) 0.63 MG/3ML nebulizer solution  levocetirizine (XYZAL) 5 MG tablet    lidocaine (LIDODERM) 5 %    meloxicam (MOBIC) 15 MG tablet    mirtazapine (REMERON) 15 MG tablet     montelukast (SINGULAIR) 5 MG chewable tablet    omeprazole 20 mg  simvastatin (ZOCOR) 10 MG tablet  traMADol (ULTRAM) 50 MG tablet  trazadone 50 mg             LOV: 005/01/2025  NOV : 08/01/2025      Please advise    Thank you

## 2025-05-09 NOTE — TELEPHONE ENCOUNTER
[facility] is the pharmacy she needs her medications sent to and they are waiting for the orders.   Phone number is: [phone]

## 2025-05-19 ENCOUNTER — TELEMEDICINE (OUTPATIENT)
Facility: CLINIC | Age: 70
End: 2025-05-19
Payer: MEDICARE

## 2025-05-19 DIAGNOSIS — Z02.89 PAIN MANAGEMENT CONTRACT AGREEMENT: ICD-10-CM

## 2025-05-19 DIAGNOSIS — G89.4 CHRONIC PAIN SYNDROME: ICD-10-CM

## 2025-05-19 DIAGNOSIS — E78.5 DYSLIPIDEMIA: ICD-10-CM

## 2025-05-19 DIAGNOSIS — I27.82 OTHER CHRONIC PULMONARY EMBOLISM WITHOUT ACUTE COR PULMONALE (HCC): ICD-10-CM

## 2025-05-19 DIAGNOSIS — J42 CHRONIC BRONCHITIS, UNSPECIFIED CHRONIC BRONCHITIS TYPE (HCC): ICD-10-CM

## 2025-05-19 DIAGNOSIS — M70.62 TROCHANTERIC BURSITIS OF LEFT HIP: ICD-10-CM

## 2025-05-19 DIAGNOSIS — F11.20 OPIOID DEPENDENCE WITH CURRENT USE (HCC): ICD-10-CM

## 2025-05-19 DIAGNOSIS — I10 ESSENTIAL (PRIMARY) HYPERTENSION: Primary | ICD-10-CM

## 2025-05-19 DIAGNOSIS — M70.61 TROCHANTERIC BURSITIS OF RIGHT HIP: ICD-10-CM

## 2025-05-19 DIAGNOSIS — J45.40 MODERATE PERSISTENT ASTHMA WITHOUT COMPLICATION: ICD-10-CM

## 2025-05-19 DIAGNOSIS — F33.42 RECURRENT MAJOR DEPRESSIVE DISORDER, IN FULL REMISSION: ICD-10-CM

## 2025-05-19 DIAGNOSIS — F51.01 PRIMARY INSOMNIA: ICD-10-CM

## 2025-05-19 DIAGNOSIS — J43.9 PULMONARY EMPHYSEMA, UNSPECIFIED EMPHYSEMA TYPE (HCC): ICD-10-CM

## 2025-05-19 PROBLEM — E87.5 HYPERKALEMIA: Status: RESOLVED | Noted: 2025-01-17 | Resolved: 2025-05-19

## 2025-05-19 PROBLEM — R07.2 PRECORDIAL PAIN: Status: RESOLVED | Noted: 2024-06-20 | Resolved: 2025-05-19

## 2025-05-19 PROCEDURE — G2211 COMPLEX E/M VISIT ADD ON: HCPCS | Performed by: STUDENT IN AN ORGANIZED HEALTH CARE EDUCATION/TRAINING PROGRAM

## 2025-05-19 PROCEDURE — 1123F ACP DISCUSS/DSCN MKR DOCD: CPT | Performed by: STUDENT IN AN ORGANIZED HEALTH CARE EDUCATION/TRAINING PROGRAM

## 2025-05-19 PROCEDURE — 99214 OFFICE O/P EST MOD 30 MIN: CPT | Performed by: STUDENT IN AN ORGANIZED HEALTH CARE EDUCATION/TRAINING PROGRAM

## 2025-05-19 RX ORDER — LIDOCAINE 50 MG/G
1 PATCH TOPICAL DAILY
Qty: 90 PATCH | Refills: 1 | Status: SHIPPED | OUTPATIENT
Start: 2025-05-19

## 2025-05-19 RX ORDER — ALBUTEROL SULFATE 0.63 MG/3ML
1 SOLUTION RESPIRATORY (INHALATION) EVERY 6 HOURS PRN
Qty: 270 ML | Refills: 5 | Status: SHIPPED | OUTPATIENT
Start: 2025-05-19

## 2025-05-19 RX ORDER — MELOXICAM 15 MG/1
15 TABLET ORAL DAILY
Qty: 90 TABLET | Refills: 3 | Status: SHIPPED | OUTPATIENT
Start: 2025-05-19

## 2025-05-19 RX ORDER — AMLODIPINE BESYLATE 5 MG/1
5 TABLET ORAL DAILY
Qty: 90 TABLET | Refills: 1 | Status: SHIPPED | OUTPATIENT
Start: 2025-05-19 | End: 2025-05-19

## 2025-05-19 RX ORDER — MIRTAZAPINE 15 MG/1
15 TABLET, FILM COATED ORAL NIGHTLY
Qty: 90 TABLET | Refills: 1 | Status: SHIPPED | OUTPATIENT
Start: 2025-05-19

## 2025-05-19 RX ORDER — MONTELUKAST SODIUM 5 MG/1
5 TABLET, CHEWABLE ORAL EVERY EVENING
Qty: 90 TABLET | Refills: 0 | Status: SHIPPED | OUTPATIENT
Start: 2025-05-19

## 2025-05-19 RX ORDER — ALBUTEROL SULFATE 90 UG/1
2 INHALANT RESPIRATORY (INHALATION) EVERY 4 HOURS PRN
Qty: 54 G | Refills: 5 | Status: SHIPPED | OUTPATIENT
Start: 2025-05-19

## 2025-05-19 RX ORDER — NIFEDIPINE 30 MG/1
30 TABLET, EXTENDED RELEASE ORAL DAILY
Qty: 90 TABLET | Refills: 1 | Status: SHIPPED | OUTPATIENT
Start: 2025-05-19 | End: 2025-05-19

## 2025-05-19 RX ORDER — LEVOCETIRIZINE DIHYDROCHLORIDE 5 MG/1
5 TABLET, FILM COATED ORAL NIGHTLY
Qty: 90 TABLET | Refills: 1 | Status: SHIPPED | OUTPATIENT
Start: 2025-05-19

## 2025-05-19 RX ORDER — BACLOFEN 10 MG/1
10 TABLET ORAL 2 TIMES DAILY
Qty: 180 TABLET | Refills: 1 | Status: SHIPPED | OUTPATIENT
Start: 2025-05-19

## 2025-05-19 RX ORDER — SIMVASTATIN 10 MG
10 TABLET ORAL NIGHTLY
Qty: 90 TABLET | Refills: 0 | Status: SHIPPED | OUTPATIENT
Start: 2025-05-19

## 2025-05-19 RX ORDER — TRAZODONE HYDROCHLORIDE 100 MG/1
100 TABLET ORAL NIGHTLY PRN
Qty: 90 TABLET | Refills: 1 | Status: SHIPPED | OUTPATIENT
Start: 2025-05-19

## 2025-05-19 RX ORDER — NIFEDIPINE 30 MG/1
30 TABLET, EXTENDED RELEASE ORAL DAILY
Qty: 90 TABLET | Refills: 1 | Status: SHIPPED | OUTPATIENT
Start: 2025-05-19

## 2025-05-19 NOTE — PROGRESS NOTES
Mayo Salinas, was evaluated through a synchronous (real-time) audio-video encounter. The patient (or guardian if applicable) is aware that this is a billable service, which includes applicable co-pays. This Virtual Visit was conducted with patient's (and/or legal guardian's) consent. Patient identification was verified, and a caregiver was present when appropriate.   The patient was located at Home: [address]  [address]  [place] [address]  Provider was located at Facility (Appt Dept): [address], [address]  [place] [address]  Confirm you are appropriately licensed, registered, or certified to deliver care in the state where the patient is located as indicated above. If you are not or unsure, please re-schedule the visit: Yes, I confirm.     Mayo Salinas (:  1955) is a Established patient, presenting virtually for evaluation of the following:      Below is the assessment and plan developed based on review of pertinent history, physical exam, labs, studies, and medications.     Assessment & Plan  Moderate persistent asthma without complication     - controlled; refill meds  Orders:    albuterol (ACCUNEB) 0.63 MG/3ML nebulizer solution; Take 3 mLs by nebulization every 6 hours as needed for Wheezing    Essential (primary) hypertension     - pedal edema possibly due to amlodipine; replace with nifedipine 30  Orders:    NIFEdipine (PROCARDIA XL) 30 MG extended release tablet; Take 1 tablet by mouth daily    Other chronic pulmonary embolism without acute cor pulmonale (HCC)     - no more DAPT; only eliquis  Orders:    apixaban (ELIQUIS) 5 MG TABS tablet; Take 1 tablet by mouth 2 times daily    montelukast (SINGULAIR) 5 MG chewable tablet; Take 1 tablet by mouth every evening    Chronic pain syndrome  - controlled; refill meds  Orders:    baclofen (LIORESAL) 10 MG tablet; Take 1 tablet by mouth 2 times daily    Pulmonary emphysema, unspecified emphysema type (HCC)     -

## 2025-05-19 NOTE — ASSESSMENT & PLAN NOTE
- controlled; refill meds  Orders:    mirtazapine (REMERON) 15 MG tablet; Take 1 tablet by mouth nightly    traZODone (DESYREL) 100 MG tablet; Take 1 tablet by mouth nightly as needed for Sleep

## 2025-05-19 NOTE — ASSESSMENT & PLAN NOTE
- no more DAPT; only eliquis  Orders:    apixaban (ELIQUIS) 5 MG TABS tablet; Take 1 tablet by mouth 2 times daily    montelukast (SINGULAIR) 5 MG chewable tablet; Take 1 tablet by mouth every evening

## 2025-05-19 NOTE — ASSESSMENT & PLAN NOTE
- controlled; refill meds  Orders:    albuterol (ACCUNEB) 0.63 MG/3ML nebulizer solution; Take 3 mLs by nebulization every 6 hours as needed for Wheezing    albuterol sulfate HFA (PROVENTIL;VENTOLIN;PROAIR) 108 (90 Base) MCG/ACT inhaler; Inhale 2 puffs into the lungs every 4 hours as needed for Wheezing    apixaban (ELIQUIS) 5 MG TABS tablet; Take 1 tablet by mouth 2 times daily    Budeson-Glycopyrrol-Formoterol 160-9-4.8 MCG/ACT AERO; Inhale 2 puffs into the lungs daily    montelukast (SINGULAIR) 5 MG chewable tablet; Take 1 tablet by mouth every evening

## 2025-05-19 NOTE — ASSESSMENT & PLAN NOTE
Orders:    montelukast (SINGULAIR) 5 MG chewable tablet; Take 1 tablet by mouth every evening

## 2025-05-19 NOTE — ASSESSMENT & PLAN NOTE
- controlled; refill meds  Orders:    baclofen (LIORESAL) 10 MG tablet; Take 1 tablet by mouth 2 times daily

## 2025-05-19 NOTE — ASSESSMENT & PLAN NOTE
- controlled; refill meds  Orders:    mirtazapine (REMERON) 15 MG tablet; Take 1 tablet by mouth nightly

## 2025-05-19 NOTE — ASSESSMENT & PLAN NOTE
- controlled; refill meds  Orders:    diclofenac sodium (VOLTAREN) 1 % GEL; Apply topically 4 times daily as needed for Pain    lidocaine (LIDODERM) 5 %; Place 1 patch onto the skin daily Remove after 12 hours    meloxicam (MOBIC) 15 MG tablet; Take 1 tablet by mouth daily

## 2025-05-19 NOTE — ASSESSMENT & PLAN NOTE
- pedal edema possibly due to amlodipine; replace with nifedipine 30  Orders:    NIFEdipine (PROCARDIA XL) 30 MG extended release tablet; Take 1 tablet by mouth daily

## 2025-05-19 NOTE — ASSESSMENT & PLAN NOTE
- controlled; refill meds  Orders:    albuterol (ACCUNEB) 0.63 MG/3ML nebulizer solution; Take 3 mLs by nebulization every 6 hours as needed for Wheezing

## 2025-05-19 NOTE — ASSESSMENT & PLAN NOTE
- controlled; refill meds  Orders:    linaclotide (LINZESS) 145 MCG capsule; TAKE 1 CAPSULE BY MOUTH DAILY BEFORE BREAKFAST FOR CONSTIPATION

## 2025-05-27 ENCOUNTER — TELEPHONE (OUTPATIENT)
Facility: CLINIC | Age: 70
End: 2025-05-27

## 2025-05-27 NOTE — TELEPHONE ENCOUNTER
Voltaren gel is the most basic topical analgesic.  I cannot think of any other options, but her insurance may have random choices.  She would have to ask her insurance what they cover.

## 2025-05-27 NOTE — TELEPHONE ENCOUNTER
Pt is calling in wants to know if she can get a medication alternative for the below medication as this is not covered by insurance       diclofenac sodium (VOLTAREN) 1 % GEL       LOV:05/01/2025  NOV 05/30/2025    Please advise      Thank you

## 2025-05-30 ENCOUNTER — OFFICE VISIT (OUTPATIENT)
Facility: CLINIC | Age: 70
End: 2025-05-30

## 2025-05-30 VITALS — DIASTOLIC BLOOD PRESSURE: 69 MMHG | SYSTOLIC BLOOD PRESSURE: 107 MMHG

## 2025-05-30 DIAGNOSIS — Z01.30 BP CHECK: Primary | ICD-10-CM

## 2025-05-30 NOTE — PROGRESS NOTES
Pt presented for BP check. Within normal limits. Will return to office 8/1/25 for visit with Dr CRUZ

## 2025-06-04 DIAGNOSIS — F51.01 PRIMARY INSOMNIA: ICD-10-CM

## 2025-06-05 DIAGNOSIS — M51.362 DEGENERATION OF INTERVERTEBRAL DISC OF LUMBAR REGION WITH DISCOGENIC BACK PAIN AND LOWER EXTREMITY PAIN: Primary | ICD-10-CM

## 2025-06-05 RX ORDER — TRAMADOL HYDROCHLORIDE 50 MG/1
50 TABLET ORAL 2 TIMES DAILY PRN
Qty: 14 TABLET | Refills: 0 | Status: SHIPPED | OUTPATIENT
Start: 2025-06-05 | End: 2025-06-12

## 2025-06-09 RX ORDER — TRAZODONE HYDROCHLORIDE 100 MG/1
100 TABLET ORAL NIGHTLY PRN
Qty: 90 TABLET | Refills: 1 | OUTPATIENT
Start: 2025-06-09

## 2025-06-10 ENCOUNTER — TELEPHONE (OUTPATIENT)
Facility: CLINIC | Age: 70
End: 2025-06-10
Payer: MEDICARE

## 2025-06-10 DIAGNOSIS — Z02.89 PAIN MANAGEMENT CONTRACT AGREEMENT: ICD-10-CM

## 2025-06-10 DIAGNOSIS — G89.4 CHRONIC PAIN SYNDROME: Primary | ICD-10-CM

## 2025-06-10 DIAGNOSIS — J43.9 PULMONARY EMPHYSEMA, UNSPECIFIED EMPHYSEMA TYPE (HCC): ICD-10-CM

## 2025-06-10 PROCEDURE — G2252 BRIEF CHKIN BY MD/QHP, 11-20: HCPCS | Performed by: STUDENT IN AN ORGANIZED HEALTH CARE EDUCATION/TRAINING PROGRAM

## 2025-06-10 RX ORDER — TRAMADOL HYDROCHLORIDE 50 MG/1
50 TABLET ORAL 3 TIMES DAILY
Qty: 90 TABLET | Refills: 0 | Status: SHIPPED | OUTPATIENT
Start: 2025-06-10 | End: 2025-07-10

## 2025-06-10 NOTE — TELEPHONE ENCOUNTER
Pt called to s/w Dr. Ramsay to discuss Tramadol refill for only 14 tablets.  Please call. Thank you.

## 2025-06-10 NOTE — TELEPHONE ENCOUNTER
Spoke with pt. I will order her regular tramadol TID.  She has had increased pain in shoulder, hips, and hands.  I encouraged her to make an in-person appointment.    I have spent 22 minutes on chart review, pt communication, medication reconciliation, and insurance requirement review.

## 2025-07-29 ENCOUNTER — TELEPHONE (OUTPATIENT)
Facility: CLINIC | Age: 70
End: 2025-07-29

## 2025-07-29 NOTE — TELEPHONE ENCOUNTER
Pt called to s/w Dr. Ramsay. She has been out of Tramadol medication and states she is going  through withdrawals.     She states this is Urgent and insists that he return call today before the office closes b/c she does not want to end up in the hospital.    Please call. Thank you.

## 2025-07-30 NOTE — TELEPHONE ENCOUNTER
Pt was supposed to be seen in May. I cannot prescribe controlled substance without the pt attending appointments. I refilled her tramadol after she did not see me in May.  I have been generous. She needs to be seen.    This can be addressed in her next appointment on 8/1

## 2025-08-01 ENCOUNTER — OFFICE VISIT (OUTPATIENT)
Facility: CLINIC | Age: 70
End: 2025-08-01
Payer: MEDICARE

## 2025-08-01 VITALS
RESPIRATION RATE: 15 BRPM | BODY MASS INDEX: 28.99 KG/M2 | HEIGHT: 63 IN | SYSTOLIC BLOOD PRESSURE: 125 MMHG | OXYGEN SATURATION: 88 % | DIASTOLIC BLOOD PRESSURE: 79 MMHG | WEIGHT: 163.6 LBS | TEMPERATURE: 97.3 F | HEART RATE: 92 BPM

## 2025-08-01 DIAGNOSIS — Z12.31 SCREENING MAMMOGRAM FOR BREAST CANCER: ICD-10-CM

## 2025-08-01 DIAGNOSIS — J43.9 PULMONARY EMPHYSEMA, UNSPECIFIED EMPHYSEMA TYPE (HCC): ICD-10-CM

## 2025-08-01 DIAGNOSIS — Z02.89 PAIN MANAGEMENT CONTRACT AGREEMENT: Primary | ICD-10-CM

## 2025-08-01 DIAGNOSIS — G89.4 CHRONIC PAIN SYNDROME: ICD-10-CM

## 2025-08-01 DIAGNOSIS — M70.61 TROCHANTERIC BURSITIS OF RIGHT HIP: ICD-10-CM

## 2025-08-01 DIAGNOSIS — M70.62 TROCHANTERIC BURSITIS OF LEFT HIP: ICD-10-CM

## 2025-08-01 DIAGNOSIS — M53.3 SACRO ILIAL PAIN: ICD-10-CM

## 2025-08-01 PROCEDURE — 99215 OFFICE O/P EST HI 40 MIN: CPT | Performed by: STUDENT IN AN ORGANIZED HEALTH CARE EDUCATION/TRAINING PROGRAM

## 2025-08-01 PROCEDURE — 1160F RVW MEDS BY RX/DR IN RCRD: CPT | Performed by: STUDENT IN AN ORGANIZED HEALTH CARE EDUCATION/TRAINING PROGRAM

## 2025-08-01 PROCEDURE — G2211 COMPLEX E/M VISIT ADD ON: HCPCS | Performed by: STUDENT IN AN ORGANIZED HEALTH CARE EDUCATION/TRAINING PROGRAM

## 2025-08-01 PROCEDURE — 3074F SYST BP LT 130 MM HG: CPT | Performed by: STUDENT IN AN ORGANIZED HEALTH CARE EDUCATION/TRAINING PROGRAM

## 2025-08-01 PROCEDURE — 1159F MED LIST DOCD IN RCRD: CPT | Performed by: STUDENT IN AN ORGANIZED HEALTH CARE EDUCATION/TRAINING PROGRAM

## 2025-08-01 PROCEDURE — 1123F ACP DISCUSS/DSCN MKR DOCD: CPT | Performed by: STUDENT IN AN ORGANIZED HEALTH CARE EDUCATION/TRAINING PROGRAM

## 2025-08-01 PROCEDURE — 3078F DIAST BP <80 MM HG: CPT | Performed by: STUDENT IN AN ORGANIZED HEALTH CARE EDUCATION/TRAINING PROGRAM

## 2025-08-01 PROCEDURE — 1125F AMNT PAIN NOTED PAIN PRSNT: CPT | Performed by: STUDENT IN AN ORGANIZED HEALTH CARE EDUCATION/TRAINING PROGRAM

## 2025-08-01 RX ORDER — TRAMADOL HYDROCHLORIDE 50 MG/1
50 TABLET ORAL 3 TIMES DAILY
Qty: 90 TABLET | Refills: 0 | Status: SHIPPED | OUTPATIENT
Start: 2025-08-01 | End: 2025-08-31

## 2025-08-01 NOTE — PROGRESS NOTES
Mayo Salinas is a 69 y.o. year old female who presents today for   Chief Complaint   Patient presents with    Hypertension       \"Have you been to the ER, urgent care clinic since your last visit?  Hospitalized since your last visit?\"    No    “Have you seen or consulted any other health care providers outside our system since your last visit?”    No          Click Here for Release of Records Request    Kaveh Abbott [address] [address] [phone]

## 2025-08-01 NOTE — PROGRESS NOTES
Mayo Salinas (:  1955) is a 69 y.o. female here for evaluation of the following chief complaint(s):  Hypertension        ASSESSMENT/PLAN:  1. Pain management contract agreement  -     diclofenac sodium (VOLTAREN) 1 % GEL; Apply topically 4 times daily as needed for Pain, Topical, 4 TIMES DAILY PRN Starting 2025, Disp-500 g, R-1, Normal  -     traMADol (ULTRAM) 50 MG tablet; Take 1 tablet by mouth in the morning, at noon, and at bedtime for 30 days. Max Daily Amount: 150 mg, Disp-90 tablet, R-0Normal  2. Trochanteric bursitis of left hip  -     diclofenac sodium (VOLTAREN) 1 % GEL; Apply topically 4 times daily as needed for Pain, Topical, 4 TIMES DAILY PRN Starting 2025, Disp-500 g, R-1, Normal  -     traMADol (ULTRAM) 50 MG tablet; Take 1 tablet by mouth in the morning, at noon, and at bedtime for 30 days. Max Daily Amount: 150 mg, Disp-90 tablet, R-0Normal  3. Trochanteric bursitis of right hip  -     diclofenac sodium (VOLTAREN) 1 % GEL; Apply topically 4 times daily as needed for Pain, Topical, 4 TIMES DAILY PRN Starting 2025, Disp-500 g, R-1, Normal  -     traMADol (ULTRAM) 50 MG tablet; Take 1 tablet by mouth in the morning, at noon, and at bedtime for 30 days. Max Daily Amount: 150 mg, Disp-90 tablet, R-0Normal  4. Pulmonary emphysema, unspecified emphysema type (HCC)  -     Budeson-Glycopyrrol-Formoterol 160-9-4.8 MCG/ACT AERO; Inhale 2 puffs into the lungs in the morning and at bedtime, Disp-10.7 g, R-3Normal  5. Sacro ilial pain  -     diclofenac sodium (VOLTAREN) 1 % GEL; Apply topically 4 times daily as needed for Pain, Topical, 4 TIMES DAILY PRN Starting 2025, Disp-500 g, R-1, Normal  -     traMADol (ULTRAM) 50 MG tablet; Take 1 tablet by mouth in the morning, at noon, and at bedtime for 30 days. Max Daily Amount: 150 mg, Disp-90 tablet, R-0Normal  6. Chronic pain syndrome  -     diclofenac sodium (VOLTAREN) 1 % GEL; Apply topically 4 times daily as

## 2025-08-01 NOTE — PATIENT INSTRUCTIONS
[facility]  Phone:[phone]    You can receive your RSV and shingels shot at your pharmacy.

## 2025-08-02 DIAGNOSIS — J43.9 PULMONARY EMPHYSEMA, UNSPECIFIED EMPHYSEMA TYPE (HCC): ICD-10-CM

## 2025-08-02 DIAGNOSIS — I27.82 OTHER CHRONIC PULMONARY EMBOLISM WITHOUT ACUTE COR PULMONALE (HCC): ICD-10-CM

## 2025-08-02 DIAGNOSIS — J42 CHRONIC BRONCHITIS, UNSPECIFIED CHRONIC BRONCHITIS TYPE (HCC): ICD-10-CM

## 2025-08-03 DIAGNOSIS — E78.5 DYSLIPIDEMIA: ICD-10-CM

## 2025-08-04 RX ORDER — MONTELUKAST SODIUM 5 MG/1
TABLET, CHEWABLE ORAL
Qty: 90 TABLET | Refills: 11 | Status: SHIPPED | OUTPATIENT
Start: 2025-08-04

## 2025-08-04 RX ORDER — SIMVASTATIN 10 MG
10 TABLET ORAL NIGHTLY
Qty: 90 TABLET | Refills: 1 | Status: SHIPPED | OUTPATIENT
Start: 2025-08-04